# Patient Record
Sex: MALE | Race: WHITE | NOT HISPANIC OR LATINO
[De-identification: names, ages, dates, MRNs, and addresses within clinical notes are randomized per-mention and may not be internally consistent; named-entity substitution may affect disease eponyms.]

---

## 2017-08-15 ENCOUNTER — APPOINTMENT (OUTPATIENT)
Dept: HEMATOLOGY ONCOLOGY | Facility: CLINIC | Age: 72
End: 2017-08-15
Payer: MEDICARE

## 2017-08-15 ENCOUNTER — LABORATORY RESULT (OUTPATIENT)
Age: 72
End: 2017-08-15

## 2017-08-15 VITALS
TEMPERATURE: 98.7 F | HEART RATE: 94 BPM | DIASTOLIC BLOOD PRESSURE: 67 MMHG | BODY MASS INDEX: 32.07 KG/M2 | WEIGHT: 224 LBS | SYSTOLIC BLOOD PRESSURE: 99 MMHG | HEIGHT: 70 IN

## 2017-08-15 PROCEDURE — 36415 COLL VENOUS BLD VENIPUNCTURE: CPT

## 2017-08-15 PROCEDURE — 85025 COMPLETE CBC W/AUTO DIFF WBC: CPT

## 2017-08-16 LAB
ALBUMIN SERPL ELPH-MCNC: 4.2 G/DL
ALP BLD-CCNC: 55 U/L
ALT SERPL-CCNC: 38 U/L
ANION GAP SERPL CALC-SCNC: 16 MMOL/L
AST SERPL-CCNC: 43 U/L
BILIRUB SERPL-MCNC: 0.4 MG/DL
BUN SERPL-MCNC: 23 MG/DL
CALCIUM SERPL-MCNC: 9.6 MG/DL
CHLORIDE SERPL-SCNC: 102 MMOL/L
CO2 SERPL-SCNC: 23 MMOL/L
CREAT SERPL-MCNC: 0.98 MG/DL
GLUCOSE SERPL-MCNC: 124 MG/DL
HCT VFR BLD CALC: 47.2 %
HGB BLD-MCNC: 16.4 G/DL
MCHC RBC-ENTMCNC: 32.1 PG
MCHC RBC-ENTMCNC: 34.8 GM/DL
MCV RBC AUTO: 92.3 FL
PLATELET # BLD AUTO: 131 K/UL
POTASSIUM SERPL-SCNC: 4.2 MMOL/L
PROT SERPL-MCNC: 6.7 G/DL
RBC # BLD: 5.12 M/UL
RBC # FLD: 11.6 %
SODIUM SERPL-SCNC: 141 MMOL/L
WBC # FLD AUTO: 5.3 K/UL

## 2017-08-21 LAB
ALBUMIN MFR SERPL ELPH: 60.2 %
ALBUMIN SERPL-MCNC: 4 G/DL
ALBUMIN/GLOB SERPL: 1.5 RATIO
ALBUPE: 48.9 %
ALPHA1 GLOB MFR SERPL ELPH: 4 %
ALPHA1 GLOB SERPL ELPH-MCNC: 0.3 G/DL
ALPHA1UPE: 16 %
ALPHA2 GLOB MFR SERPL ELPH: 10.9 %
ALPHA2 GLOB SERPL ELPH-MCNC: 0.7 G/DL
ALPHA2UPE: 12.8 %
B-GLOBULIN MFR SERPL ELPH: 10.7 %
B-GLOBULIN SERPL ELPH-MCNC: 0.7 G/DL
BETAUPE: 12 %
CREAT 24H UR-MCNC: NORMAL G/24 H
CREATININE UR (MAYO): 194 MG/DL
DEPRECATED KAPPA LC FREE/LAMBDA SER: 1.62 RATIO
DEPRECATED KAPPA LC FREE/LAMBDA SER: 1.62 RATIO
GAMMA GLOB FLD ELPH-MCNC: 1 G/DL
GAMMA GLOB MFR SERPL ELPH: 14.2 %
GAMMAUPE: 10.3 %
IGA 24H UR QL IFE: NORMAL
IGA SER QL IEP: 165 MG/DL
IGG SER QL IEP: 936 MG/DL
IGM SER QL IEP: 57 MG/DL
INTERPRETATION SERPL IEP-IMP: NORMAL
KAPPA LC 24H UR QL: PRESENT
KAPPA LC CSF-MCNC: 1.55 MG/DL
KAPPA LC CSF-MCNC: 1.55 MG/DL
KAPPA LC SERPL-MCNC: 2.51 MG/DL
KAPPA LC SERPL-MCNC: 2.51 MG/DL
M PROTEIN MFR SERPL ELPH: 4.9 %
M PROTEIN SPEC IFE-MCNC: NORMAL
MONOCLON BAND OBS SERPL: 0.3 G/DL
PROT PATTERN 24H UR ELPH-IMP: NORMAL
PROT SERPL-MCNC: 6.7 G/DL
PROT SERPL-MCNC: 6.7 G/DL
PROT UR-MCNC: 12 MG/DL
PROT UR-MCNC: 12 MG/DL
SPECIMEN VOL 24H UR: NORMAL ML

## 2017-08-22 ENCOUNTER — APPOINTMENT (OUTPATIENT)
Dept: HEMATOLOGY ONCOLOGY | Facility: CLINIC | Age: 72
End: 2017-08-22
Payer: MEDICARE

## 2017-08-22 VITALS
TEMPERATURE: 98.3 F | HEIGHT: 70 IN | WEIGHT: 225 LBS | DIASTOLIC BLOOD PRESSURE: 69 MMHG | SYSTOLIC BLOOD PRESSURE: 103 MMHG | BODY MASS INDEX: 32.21 KG/M2 | HEART RATE: 92 BPM

## 2017-08-22 PROCEDURE — 99213 OFFICE O/P EST LOW 20 MIN: CPT

## 2017-08-22 RX ORDER — DENOSUMAB 60 MG/ML
INJECTION SUBCUTANEOUS
Refills: 0 | Status: ACTIVE | COMMUNITY

## 2018-08-10 ENCOUNTER — LABORATORY RESULT (OUTPATIENT)
Age: 73
End: 2018-08-10

## 2018-08-10 ENCOUNTER — APPOINTMENT (OUTPATIENT)
Dept: HEMATOLOGY ONCOLOGY | Facility: CLINIC | Age: 73
End: 2018-08-10
Payer: MEDICARE

## 2018-08-10 VITALS
BODY MASS INDEX: 32.1 KG/M2 | HEART RATE: 83 BPM | DIASTOLIC BLOOD PRESSURE: 79 MMHG | HEIGHT: 70 IN | TEMPERATURE: 98.5 F | WEIGHT: 224.25 LBS | SYSTOLIC BLOOD PRESSURE: 122 MMHG

## 2018-08-10 PROCEDURE — 85025 COMPLETE CBC W/AUTO DIFF WBC: CPT

## 2018-08-10 PROCEDURE — 36415 COLL VENOUS BLD VENIPUNCTURE: CPT

## 2018-08-17 ENCOUNTER — APPOINTMENT (OUTPATIENT)
Dept: HEMATOLOGY ONCOLOGY | Facility: CLINIC | Age: 73
End: 2018-08-17
Payer: MEDICARE

## 2018-08-17 VITALS
SYSTOLIC BLOOD PRESSURE: 122 MMHG | WEIGHT: 224.25 LBS | DIASTOLIC BLOOD PRESSURE: 70 MMHG | HEIGHT: 70 IN | HEART RATE: 91 BPM | BODY MASS INDEX: 32.1 KG/M2 | TEMPERATURE: 98.5 F

## 2018-08-17 LAB
ALBUMIN MFR SERPL ELPH: 60.9 %
ALBUMIN SERPL ELPH-MCNC: 4.4 G/DL
ALBUMIN SERPL-MCNC: 4 G/DL
ALBUMIN/GLOB SERPL: 1.5 RATIO
ALBUPE: 30.6 %
ALP BLD-CCNC: 51 U/L
ALPHA1 GLOB MFR SERPL ELPH: 3.9 %
ALPHA1 GLOB SERPL ELPH-MCNC: 0.3 G/DL
ALPHA1UPE: 12.2 %
ALPHA2 GLOB MFR SERPL ELPH: 10.8 %
ALPHA2 GLOB SERPL ELPH-MCNC: 0.7 G/DL
ALPHA2UPE: 17.4 %
ALT SERPL-CCNC: 31 U/L
ANION GAP SERPL CALC-SCNC: 14 MMOL/L
AST SERPL-CCNC: 44 U/L
B-GLOBULIN MFR SERPL ELPH: 10.1 %
B-GLOBULIN SERPL ELPH-MCNC: 0.7 G/DL
BETAUPE: 19.5 %
BILIRUB SERPL-MCNC: 0.4 MG/DL
BUN SERPL-MCNC: 19 MG/DL
CALCIUM SERPL-MCNC: 9.3 MG/DL
CHLORIDE SERPL-SCNC: 104 MMOL/L
CO2 SERPL-SCNC: 23 MMOL/L
CREAT 24H UR-MCNC: NORMAL G/24 H
CREAT SERPL-MCNC: 0.94 MG/DL
CREATININE UR (MAYO): 141 MG/DL
DEPRECATED KAPPA LC FREE/LAMBDA SER: 1.05 RATIO
GAMMA GLOB FLD ELPH-MCNC: 0.9 G/DL
GAMMA GLOB MFR SERPL ELPH: 14.3 %
GAMMAUPE: 20.3 %
GLUCOSE SERPL-MCNC: 102 MG/DL
HCT VFR BLD CALC: 47.3 %
HGB BLD-MCNC: 16.2 G/DL
IGA 24H UR QL IFE: NORMAL
IGA SER QL IEP: 174 MG/DL
IGG SER QL IEP: 1057 MG/DL
IGM SER QL IEP: 64 MG/DL
INTERPRETATION SERPL IEP-IMP: NORMAL
KAPPA LC 24H UR QL: PRESENT
KAPPA LC 24H UR QL: PRESENT
KAPPA LC CSF-MCNC: 1.47 MG/DL
KAPPA LC SERPL-MCNC: 1.55 MG/DL
M PROTEIN MFR SERPL ELPH: 3.9 %
M PROTEIN SPEC IFE-MCNC: NORMAL
MCHC RBC-ENTMCNC: 31.4 PG
MCHC RBC-ENTMCNC: 34.3 GM/DL
MCV RBC AUTO: 91.5 FL
MONOCLON BAND OBS SERPL: 0.3 G/DL
PLATELET # BLD AUTO: 152 K/UL
POTASSIUM SERPL-SCNC: 4.1 MMOL/L
PROT PATTERN 24H UR ELPH-IMP: NORMAL
PROT SERPL-MCNC: 6.6 G/DL
PROT UR-MCNC: 12 MG/DL
PROT UR-MCNC: 12 MG/DL
RBC # BLD: 5.16 M/UL
RBC # FLD: 11 %
SODIUM SERPL-SCNC: 141 MMOL/L
SPECIMEN VOL 24H UR: NORMAL ML
WBC # FLD AUTO: 5.3 K/UL

## 2018-08-17 PROCEDURE — 99213 OFFICE O/P EST LOW 20 MIN: CPT

## 2018-08-17 RX ORDER — CHOLECALCIFEROL (VITAMIN D3) 50 MCG
50 MCG TABLET ORAL
Refills: 0 | Status: ACTIVE | COMMUNITY
Start: 2018-08-17

## 2018-08-17 RX ORDER — FAMOTIDINE 20 MG/1
20 TABLET, FILM COATED ORAL TWICE DAILY
Refills: 0 | Status: ACTIVE | COMMUNITY
Start: 2018-08-17

## 2018-08-17 RX ORDER — LEVOMEFOLATE CALCIUM 15 MG
15 TABLET ORAL
Refills: 0 | Status: ACTIVE | COMMUNITY
Start: 2018-08-17

## 2019-06-21 ENCOUNTER — TRANSCRIPTION ENCOUNTER (OUTPATIENT)
Age: 74
End: 2019-06-21

## 2019-07-31 ENCOUNTER — APPOINTMENT (OUTPATIENT)
Dept: HEMATOLOGY ONCOLOGY | Facility: CLINIC | Age: 74
End: 2019-07-31
Payer: MEDICARE

## 2019-07-31 ENCOUNTER — LABORATORY RESULT (OUTPATIENT)
Age: 74
End: 2019-07-31

## 2019-07-31 VITALS — SYSTOLIC BLOOD PRESSURE: 100 MMHG | DIASTOLIC BLOOD PRESSURE: 68 MMHG | TEMPERATURE: 98.3 F | HEART RATE: 88 BPM

## 2019-07-31 LAB
HCT VFR BLD CALC: 45.8 %
HGB BLD-MCNC: 15.3 G/DL
MCHC RBC-ENTMCNC: 31 PG
MCHC RBC-ENTMCNC: 33.4 GM/DL
MCV RBC AUTO: 92.9 FL
PLATELET # BLD AUTO: 134 K/UL
RBC # BLD: 4.93 M/UL
RBC # FLD: 11 %
WBC # FLD AUTO: 5.1 K/UL

## 2019-07-31 PROCEDURE — 85025 COMPLETE CBC W/AUTO DIFF WBC: CPT

## 2019-07-31 PROCEDURE — 36415 COLL VENOUS BLD VENIPUNCTURE: CPT

## 2019-08-01 LAB
ALBUMIN MFR SERPL ELPH: 62.3 %
ALBUMIN SERPL ELPH-MCNC: 4.1 G/DL
ALBUMIN SERPL-MCNC: 3.9 G/DL
ALBUMIN/GLOB SERPL: 1.7 RATIO
ALP BLD-CCNC: 53 U/L
ALPHA1 GLOB MFR SERPL ELPH: 4.1 %
ALPHA1 GLOB SERPL ELPH-MCNC: 0.3 G/DL
ALPHA2 GLOB MFR SERPL ELPH: 10.3 %
ALPHA2 GLOB SERPL ELPH-MCNC: 0.6 G/DL
ALT SERPL-CCNC: 31 U/L
ANION GAP SERPL CALC-SCNC: 12 MMOL/L
AST SERPL-CCNC: 33 U/L
B-GLOBULIN MFR SERPL ELPH: 9.8 %
B-GLOBULIN SERPL ELPH-MCNC: 0.6 G/DL
BILIRUB SERPL-MCNC: 0.3 MG/DL
BUN SERPL-MCNC: 19 MG/DL
CALCIUM SERPL-MCNC: 9 MG/DL
CHLORIDE SERPL-SCNC: 106 MMOL/L
CO2 SERPL-SCNC: 23 MMOL/L
CREAT SERPL-MCNC: 0.81 MG/DL
DEPRECATED KAPPA LC FREE/LAMBDA SER: 1.36 RATIO
DEPRECATED KAPPA LC FREE/LAMBDA SER: 1.36 RATIO
GAMMA GLOB FLD ELPH-MCNC: 0.8 G/DL
GAMMA GLOB MFR SERPL ELPH: 13.5 %
GLUCOSE SERPL-MCNC: 110 MG/DL
IGA SER QL IEP: 153 MG/DL
IGG SER QL IEP: 852 MG/DL
IGM SER QL IEP: 54 MG/DL
INTERPRETATION SERPL IEP-IMP: NORMAL
KAPPA LC CSF-MCNC: 1.59 MG/DL
KAPPA LC CSF-MCNC: 1.59 MG/DL
KAPPA LC SERPL-MCNC: 2.16 MG/DL
KAPPA LC SERPL-MCNC: 2.16 MG/DL
M PROTEIN MFR SERPL ELPH: 2.4 %
M PROTEIN SPEC IFE-MCNC: NORMAL
MONOCLON BAND OBS SERPL: 0.1 G/DL
POTASSIUM SERPL-SCNC: 3.9 MMOL/L
PROT SERPL-MCNC: 6.2 G/DL
SODIUM SERPL-SCNC: 141 MMOL/L

## 2019-08-05 LAB
ALBUPE: 11.1 %
ALPHA1UPE: 37.1 %
ALPHA2UPE: 21.6 %
BETAUPE: 14.3 %
CREAT 24H UR-MCNC: NORMAL G/24 H
CREATININE UR (MAYO): 88 MG/DL
FREE KAPPA URINE: 26.7 MG/L
FREE KAPPA/LAMDA RATIO: 25.19
FREE LAMDA URINE: 1.06 MG/L
GAMMAUPE: 15.9 %
IGA 24H UR QL IFE: NORMAL
KAPPA LC 24H UR QL: NORMAL
PROT PATTERN 24H UR ELPH-IMP: NORMAL
PROT UR-MCNC: 8 MG/DL
PROT UR-MCNC: 8 MG/DL
SPECIMEN VOL 24H UR: NORMAL ML

## 2019-08-07 ENCOUNTER — APPOINTMENT (OUTPATIENT)
Dept: HEMATOLOGY ONCOLOGY | Facility: CLINIC | Age: 74
End: 2019-08-07
Payer: MEDICARE

## 2019-08-07 DIAGNOSIS — D47.2 MONOCLONAL GAMMOPATHY: ICD-10-CM

## 2019-08-07 PROCEDURE — 99214 OFFICE O/P EST MOD 30 MIN: CPT

## 2019-08-07 NOTE — REVIEW OF SYSTEMS
[Patient Intake Form Reviewed] : Patient intake form was reviewed [Fatigue] : fatigue [Joint Stiffness] : joint stiffness [Negative] : Endocrine [FreeTextEntry6] : mild chronic dyspnea [FreeTextEntry2] :  gained weight  [de-identified] : left sided weakness due to CVA

## 2019-08-07 NOTE — PHYSICAL EXAM
[Normal] : affect appropriate [de-identified] : looks well [de-identified] : walks with cane [de-identified] : left sided weakness- unchanged

## 2019-08-07 NOTE — ASSESSMENT
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 \par Discussed option of clinical follow up only - re-evaluation if anemia/ or unexplained renal disease or bone related symptoms. patient is more comfortable with continuous laboratory monitoring as well.\par He will return in 1- 2 years - will have labs before appointment. \par \par Discussed with the patient and his wife.

## 2019-08-07 NOTE — HISTORY OF PRESENT ILLNESS
[Disease:__________________________] : Disease: [unfilled] [de-identified] : No new medical issues since his visit one year ago.   No new complaints.  [de-identified] : Presented with low level monoclonal IgG kappa on a routine blood work in 2010. No anemia, renal disease or neuropathy. He has a history of osteoporosis and compression fractures- treated.\par Monitored yearly.

## 2021-01-15 ENCOUNTER — EMERGENCY (EMERGENCY)
Facility: HOSPITAL | Age: 76
LOS: 0 days | Discharge: HOME | End: 2021-01-16
Attending: FAMILY MEDICINE
Payer: MEDICARE

## 2021-01-15 VITALS
HEART RATE: 94 BPM | SYSTOLIC BLOOD PRESSURE: 142 MMHG | TEMPERATURE: 98 F | RESPIRATION RATE: 17 BRPM | HEIGHT: 70 IN | DIASTOLIC BLOOD PRESSURE: 93 MMHG | WEIGHT: 218.04 LBS | OXYGEN SATURATION: 95 %

## 2021-01-15 DIAGNOSIS — K57.92 DIVERTICULITIS OF INTESTINE, PART UNSPECIFIED, WITHOUT PERFORATION OR ABSCESS WITHOUT BLEEDING: ICD-10-CM

## 2021-01-15 DIAGNOSIS — S22.49XA MULTIPLE FRACTURES OF RIBS, UNSPECIFIED SIDE, INITIAL ENCOUNTER FOR CLOSED FRACTURE: ICD-10-CM

## 2021-01-15 DIAGNOSIS — I69.354 HEMIPLEGIA AND HEMIPARESIS FOLLOWING CEREBRAL INFARCTION AFFECTING LEFT NON-DOMINANT SIDE: ICD-10-CM

## 2021-01-15 DIAGNOSIS — K21.9 GASTRO-ESOPHAGEAL REFLUX DISEASE WITHOUT ESOPHAGITIS: ICD-10-CM

## 2021-01-15 DIAGNOSIS — Z90.49 ACQUIRED ABSENCE OF OTHER SPECIFIED PARTS OF DIGESTIVE TRACT: ICD-10-CM

## 2021-01-15 DIAGNOSIS — Z79.02 LONG TERM (CURRENT) USE OF ANTITHROMBOTICS/ANTIPLATELETS: ICD-10-CM
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
## 2021-01-15 PROCEDURE — 93005 ELECTROCARDIOGRAM TRACING: CPT

## 2021-01-15 PROCEDURE — 71250 CT THORAX DX C-: CPT | Mod: 26

## 2021-01-15 PROCEDURE — 96375 TX/PRO/DX INJ NEW DRUG ADDON: CPT

## 2021-01-15 PROCEDURE — 93010 ELECTROCARDIOGRAM REPORT: CPT

## 2021-01-15 PROCEDURE — 96374 THER/PROPH/DIAG INJ IV PUSH: CPT

## 2021-01-15 PROCEDURE — 72170 X-RAY EXAM OF PELVIS: CPT | Mod: 26

## 2021-01-15 PROCEDURE — 73030 X-RAY EXAM OF SHOULDER: CPT | Mod: 26,LT

## 2021-01-15 PROCEDURE — 73600 X-RAY EXAM OF ANKLE: CPT | Mod: 26,LT

## 2021-01-15 PROCEDURE — 73030 X-RAY EXAM OF SHOULDER: CPT | Mod: LT

## 2021-01-15 PROCEDURE — 73600 X-RAY EXAM OF ANKLE: CPT | Mod: LT

## 2021-01-15 PROCEDURE — 99284 EMERGENCY DEPT VISIT MOD MDM: CPT | Mod: 25

## 2021-01-15 PROCEDURE — 99284 EMERGENCY DEPT VISIT MOD MDM: CPT

## 2021-01-15 PROCEDURE — 73020 X-RAY EXAM OF SHOULDER: CPT | Mod: LT

## 2021-01-15 PROCEDURE — 70450 CT HEAD/BRAIN W/O DYE: CPT

## 2021-01-15 PROCEDURE — 72170 X-RAY EXAM OF PELVIS: CPT

## 2021-01-15 PROCEDURE — 72125 CT NECK SPINE W/O DYE: CPT

## 2021-01-15 PROCEDURE — 73020 X-RAY EXAM OF SHOULDER: CPT | Mod: 26,59,LT

## 2021-01-15 PROCEDURE — 70450 CT HEAD/BRAIN W/O DYE: CPT | Mod: 26

## 2021-01-15 PROCEDURE — 71250 CT THORAX DX C-: CPT

## 2021-01-15 PROCEDURE — 72125 CT NECK SPINE W/O DYE: CPT | Mod: 26

## 2021-01-15 RX ORDER — LIDOCAINE 4 G/100G
1 CREAM TOPICAL ONCE
Refills: 0 | Status: COMPLETED | OUTPATIENT
Start: 2021-01-15 | End: 2021-01-15

## 2021-01-15 RX ORDER — HYDROMORPHONE HYDROCHLORIDE 2 MG/ML
1 INJECTION INTRAMUSCULAR; INTRAVENOUS; SUBCUTANEOUS ONCE
Refills: 0 | Status: DISCONTINUED | OUTPATIENT
Start: 2021-01-15 | End: 2021-01-15

## 2021-01-15 RX ORDER — ACETAMINOPHEN 500 MG
650 TABLET ORAL ONCE
Refills: 0 | Status: COMPLETED | OUTPATIENT
Start: 2021-01-15 | End: 2021-01-15

## 2021-01-15 RX ADMIN — LIDOCAINE 1 PATCH: 4 CREAM TOPICAL at 22:28

## 2021-01-15 RX ADMIN — Medication 650 MILLIGRAM(S): at 22:28

## 2021-01-15 RX ADMIN — HYDROMORPHONE HYDROCHLORIDE 1 MILLIGRAM(S): 2 INJECTION INTRAMUSCULAR; INTRAVENOUS; SUBCUTANEOUS at 22:28

## 2021-01-15 NOTE — ED PROVIDER NOTE - CARE PLAN
Principal Discharge DX:	Rib fractures   Principal Discharge DX:	Rib fractures  Secondary Diagnosis:	Fall, initial encounter

## 2021-01-15 NOTE — ED PROVIDER NOTE - CHIEF COMPLAINT
The patient is a 75y Male complaining of fall. injuries to these sites: face, neck, throat, chest, abdomen and genitals

## 2021-01-15 NOTE — ED PROVIDER NOTE - PROGRESS NOTE DETAILS
Resident note reviewed. Pt with hx cva with left sided weakness on Plavixc/o mechanical fall into own fireplace. No head injury no loc. Pt c/o left shoulder, scapula and rib pain and left ankle pain. Pt has deformity of left ankle and left arm from stroke and is supposed to wear brace on leg. Pt alert, ncat , heart rrr lungs clear abd sof marques, ext tender left scapula and left ribs. deformity of left ankle good distal pulses. sensory intact contracture of left hand and arm. Pt had ct of head, cpine and chest and xrays of ankle and pelvis. Pt received some dilaudid and lido patch with some relief. Await ct results for ?toradol / further treatment. Alex SELF Resident note reviewed. Pt with hx cva with left sided weakness on Plavixc/o mechanical fall into own fireplace. No head injury no loc. Pt c/o left shoulder, scapula and rib pain and left ankle pain. Pt has deformity of left ankle and left arm from stroke and is supposed to wear brace on leg. Usually ambulates with cane.Pt lives with wife and adult autistic daughter. Pt alert, ncat , heart rrr lungs clear abd sof marques, ext tender left scapula and left ribs. deformity of left ankle good distal pulses. sensory intact contracture of left hand and arm. Pt had ct of head, cpine and chest and xrays of ankle and pelvis. Pt received some dilaudid and lido patch with some relief. Await ct results for ?toradol / further treatment. Alex SELF Arlyn Palm I walked the patient with a walker. he is able to bear weight. pain still present but improved. will admin toradol as kindye function last year was normal as per chart review. Will apply air cast and dc with incentive spirometer, oxycontin to his pharmacy and return precautions.

## 2021-01-15 NOTE — ED ADULT NURSE NOTE - OBJECTIVE STATEMENT
Pt presents to ER s/p mechanical fall from standing. Pt reports his left ankle gave out and he fell onto his left side; c/o left shoulder/scapula pain and LLE pain. Denies hitting head. Pt reports his wife called 911 after fall and police assisted pt up. Hx of left side weakness r/t past CVA. Small bruised area/abrasion on left upper back. AO x 3 oriented to baseline, normal breathing pattern with no difficulty.

## 2021-01-15 NOTE — ED ADULT TRIAGE NOTE - CHIEF COMPLAINT QUOTE
patient brought in by EMS s/p fall from standing. patient states his ankle gave out and he fell into the brick fireplace. complains of left scapula and left ankle pain. denies hitting head. patient has left sided weakness from previous stroke. no acute distress noted on arrival.

## 2021-01-15 NOTE — ED PROVIDER NOTE - PHYSICAL EXAMINATION
Physical Exam:    I have reviewed the triage vital signs.    Gen: NAD, AOx3, non-toxic appearing, able to ambulate without assistance  Head and Neck: NCAT, Neck supple without meningismus   HEENT: EOMI, PEERLA, normal conjunctiva, tongue midline, oral mucosa moist  Lung: CTAB, no respiratory distress, no wheezes/rhonchi/rales B/L, speaking in full sentences  CV: RRR, no murmurs, rubs or gallops  Abd: soft, NT, ND, no guarding, no rigidity, no rebound tenderness, no CVA tenderness, no masses.   MSK: no gross deformities, ROM normal in UE, no back tenderness. LUE: pain to left ribs with abduction of LUE. Left hand chronically contracted, N/V/I. LLE ankle contracted, ROM limited, able to raise both legs.   Neuro: CNs II-XII grossly intact. No focal sensory or motor deficits  Skin: Warm, well perfused, no rash, no leg swelling  Psych: Appropriate mood and affect

## 2021-01-15 NOTE — ED PROVIDER NOTE - OBJECTIVE STATEMENT
75M pmh CVA 2 years ago with left sided residual weakness, left ankle and wrist contracture, on Plavix, presents to ED after mechanical fall. Patient states he twisted his ankle, and fell onto left side into his fire place. Called PD who helped him into bed. Pain to left ribs and "scapula". Also complaining of left sided neck pain. Patient waiting at home for hour before calling EMS again for t/p to hospital due to worsening pain. Denies headache, chest pain,abdominal pain. No LOC. No headstrike. Patient on blood thinners. No n/v. Unable to bear weight at this time due to pain. Patient took oxy 5mg at home prior to ED arrival.

## 2021-01-15 NOTE — ED PROVIDER NOTE - PATIENT PORTAL LINK FT
You can access the FollowMyHealth Patient Portal offered by [facility] by registering at the following website: [email or web address]. By joining [employer]’s FollowMyHealth portal, you will also be able to view your health information using other applications (apps) compatible with our system.

## 2021-01-15 NOTE — ED PROVIDER NOTE - CLINICAL SUMMARY MEDICAL DECISION MAKING FREE TEXT BOX
Arlyn: elderly male s/p fall from standing height , mechanical in nature from twisting ankle. Patient c/o rib pain. Plan: CT chest to rule in rib fractures, multi modal pain control, incentive spirometry, shoulder XR left side, pelvic xray, road test, reassess.

## 2021-01-15 NOTE — ED PROVIDER NOTE - COVID-19 ORDERING FACILITY
[facility]  - [facility]- Covid 19 Testing

## 2021-01-15 NOTE — ED ADULT NURSE NOTE - NSIMPLEMENTINTERV_GEN_ALL_ED
Implemented All Fall with Harm Risk Interventions:  [place] to call system. Call bell, personal items and telephone within reach. Instruct patient to call for assistance. Room bathroom lighting operational. Non-slip footwear when patient is off stretcher. Physically safe environment: no spills, clutter or unnecessary equipment. Stretcher in lowest position, wheels locked, appropriate side rails in place. Provide visual cue, wrist band, yellow gown, etc. Monitor gait and stability. Monitor for mental status changes and reorient to person, place, and time. Review medications for side effects contributing to fall risk. Reinforce activity limits and safety measures with patient and family. Provide visual clues: red socks.

## 2021-01-15 NOTE — ED PROVIDER NOTE - ATTENDING CONTRIBUTION TO CARE
Pt eval, treatment and pln contemporaneously with resident Arlyn. I was present during key portions of visit. Agree with notes. Alex SELF

## 2021-01-15 NOTE — ED PROVIDER NOTE - PMH
CVA (Cerebral Infarction) (ICD9 434.91)    Diverticulitis, Intestine Large (ICD9 562.11)    GERD (Gastroesophageal Reflux Disease) (ICD9 530.81)    Hip Fracture (ICD9 820.8)    Migraine Headache (ICD9 346.90)

## 2021-01-15 NOTE — ED PROVIDER NOTE - NSFOLLOWUPINSTRUCTIONS_ED_ALL_ED_FT
You were seen for rib fractures.     Seek immediate medical assistance for any new or worsening symptoms such as cough fever or chills.     Please take 600 mg of Ibuprofen (aka Motrin, Advil) and/or 650 mg Acetaminophen (aka Tylenol) every 6 hours, as needed, for mild-moderate pain.     Oxycodone 5mg was sent to your pharmacy. Take one pill every 6 hours for severe pain.     Use the incentive spirometer for breathing exercises 15 times per hour to prevent pneumonia.

## 2021-01-16 VITALS
DIASTOLIC BLOOD PRESSURE: 87 MMHG | OXYGEN SATURATION: 96 % | RESPIRATION RATE: 17 BRPM | SYSTOLIC BLOOD PRESSURE: 144 MMHG | HEART RATE: 90 BPM | TEMPERATURE: 98 F

## 2021-01-16 RX ORDER — OXYCODONE HYDROCHLORIDE 5 MG/1
1 TABLET ORAL
Qty: 12 | Refills: 0
Start: 2021-01-16 | End: 2021-01-18

## 2021-01-16 RX ORDER — KETOROLAC TROMETHAMINE 30 MG/ML
15 SYRINGE (ML) INJECTION ONCE
Refills: 0 | Status: DISCONTINUED | OUTPATIENT
Start: 2021-01-16 | End: 2021-01-16

## 2021-01-16 RX ADMIN — Medication 15 MILLIGRAM(S): at 00:19

## 2021-03-17 ENCOUNTER — EMERGENCY (EMERGENCY)
Facility: HOSPITAL | Age: 76
LOS: 0 days | Discharge: HOME | End: 2021-03-17
Attending: EMERGENCY MEDICINE
Payer: MEDICARE

## 2021-03-17 VITALS — WEIGHT: 216.93 LBS | HEIGHT: 70 IN

## 2021-03-17 VITALS
TEMPERATURE: 99 F | HEART RATE: 88 BPM | SYSTOLIC BLOOD PRESSURE: 136 MMHG | OXYGEN SATURATION: 99 % | DIASTOLIC BLOOD PRESSURE: 90 MMHG | RESPIRATION RATE: 18 BRPM

## 2021-03-17 DIAGNOSIS — N41.0 ACUTE PROSTATITIS: ICD-10-CM

## 2021-03-17 DIAGNOSIS — K21.9 GASTRO-ESOPHAGEAL REFLUX DISEASE WITHOUT ESOPHAGITIS: ICD-10-CM

## 2021-03-17 DIAGNOSIS — K57.92 DIVERTICULITIS OF INTESTINE, PART UNSPECIFIED, WITHOUT PERFORATION OR ABSCESS WITHOUT BLEEDING: ICD-10-CM

## 2021-03-17 DIAGNOSIS — N41.1 CHRONIC PROSTATITIS: ICD-10-CM

## 2021-03-17 DIAGNOSIS — R30.0 DYSURIA: ICD-10-CM

## 2021-03-17 DIAGNOSIS — Z88.8 ALLERGY STATUS TO OTHER DRUGS, MEDICAMENTS AND BIOLOGICAL SUBSTANCES STATUS: ICD-10-CM

## 2021-03-17 DIAGNOSIS — R82.71 BACTERIURIA: ICD-10-CM

## 2021-03-17 DIAGNOSIS — N40.0 BENIGN PROSTATIC HYPERPLASIA WITHOUT LOWER URINARY TRACT SYMPTOMS: ICD-10-CM

## 2021-03-17 DIAGNOSIS — Z86.73 PERSONAL HISTORY OF TRANSIENT ISCHEMIC ATTACK (TIA), AND CEREBRAL INFARCTION WITHOUT RESIDUAL DEFICITS: ICD-10-CM

## 2021-03-17 DIAGNOSIS — B96.5 PSEUDOMONAS (AERUGINOSA) (MALLEI) (PSEUDOMALLEI) AS THE CAUSE OF DISEASES CLASSIFIED ELSEWHERE: ICD-10-CM

## 2021-03-17 LAB
ALBUMIN SERPL ELPH-MCNC: 3.5 G/DL — SIGNIFICANT CHANGE UP (ref 3.3–5)
ALP SERPL-CCNC: 68 U/L — SIGNIFICANT CHANGE UP (ref 40–120)
ALT FLD-CCNC: 43 U/L — SIGNIFICANT CHANGE UP (ref 12–78)
ANION GAP SERPL CALC-SCNC: 4 MMOL/L — LOW (ref 5–17)
APPEARANCE UR: CLEAR — SIGNIFICANT CHANGE UP
APTT BLD: 32.3 SEC — SIGNIFICANT CHANGE UP (ref 27.5–35.5)
AST SERPL-CCNC: 34 U/L — SIGNIFICANT CHANGE UP (ref 15–37)
BASOPHILS # BLD AUTO: 0.06 K/UL — SIGNIFICANT CHANGE UP (ref 0–0.2)
BASOPHILS NFR BLD AUTO: 1.3 % — SIGNIFICANT CHANGE UP (ref 0–2)
BILIRUB SERPL-MCNC: 0.5 MG/DL — SIGNIFICANT CHANGE UP (ref 0.2–1.2)
BILIRUB UR-MCNC: NEGATIVE — SIGNIFICANT CHANGE UP
BUN SERPL-MCNC: 19 MG/DL — SIGNIFICANT CHANGE UP (ref 7–23)
CALCIUM SERPL-MCNC: 9.2 MG/DL — SIGNIFICANT CHANGE UP (ref 8.5–10.1)
CHLORIDE SERPL-SCNC: 108 MMOL/L — SIGNIFICANT CHANGE UP (ref 96–108)
CO2 SERPL-SCNC: 28 MMOL/L — SIGNIFICANT CHANGE UP (ref 22–31)
COLOR SPEC: YELLOW — SIGNIFICANT CHANGE UP
CREAT SERPL-MCNC: 0.84 MG/DL — SIGNIFICANT CHANGE UP (ref 0.5–1.3)
CRP SERPL-MCNC: <3 MG/L — SIGNIFICANT CHANGE UP
DIFF PNL FLD: ABNORMAL
EOSINOPHIL # BLD AUTO: 0.27 K/UL — SIGNIFICANT CHANGE UP (ref 0–0.5)
EOSINOPHIL NFR BLD AUTO: 5.7 % — SIGNIFICANT CHANGE UP (ref 0–6)
ERYTHROCYTE [SEDIMENTATION RATE] IN BLOOD: 8 MM/HR — SIGNIFICANT CHANGE UP (ref 0–20)
GLUCOSE SERPL-MCNC: 92 MG/DL — SIGNIFICANT CHANGE UP (ref 70–99)
GLUCOSE UR QL: NEGATIVE — SIGNIFICANT CHANGE UP
HCT VFR BLD CALC: 45.4 % — SIGNIFICANT CHANGE UP (ref 39–50)
HGB BLD-MCNC: 14.7 G/DL — SIGNIFICANT CHANGE UP (ref 13–17)
IMM GRANULOCYTES NFR BLD AUTO: 0.2 % — SIGNIFICANT CHANGE UP (ref 0–1.5)
INR BLD: 1.14 RATIO — SIGNIFICANT CHANGE UP (ref 0.88–1.16)
KETONES UR-MCNC: NEGATIVE — SIGNIFICANT CHANGE UP
LACTATE SERPL-SCNC: 1.4 MMOL/L — SIGNIFICANT CHANGE UP (ref 0.7–2)
LEUKOCYTE ESTERASE UR-ACNC: ABNORMAL
LYMPHOCYTES # BLD AUTO: 1.29 K/UL — SIGNIFICANT CHANGE UP (ref 1–3.3)
LYMPHOCYTES # BLD AUTO: 27.3 % — SIGNIFICANT CHANGE UP (ref 13–44)
MCHC RBC-ENTMCNC: 30.8 PG — SIGNIFICANT CHANGE UP (ref 27–34)
MCHC RBC-ENTMCNC: 32.4 GM/DL — SIGNIFICANT CHANGE UP (ref 32–36)
MCV RBC AUTO: 95 FL — SIGNIFICANT CHANGE UP (ref 80–100)
MONOCYTES # BLD AUTO: 0.58 K/UL — SIGNIFICANT CHANGE UP (ref 0–0.9)
MONOCYTES NFR BLD AUTO: 12.3 % — SIGNIFICANT CHANGE UP (ref 2–14)
NEUTROPHILS # BLD AUTO: 2.52 K/UL — SIGNIFICANT CHANGE UP (ref 1.8–7.4)
NEUTROPHILS NFR BLD AUTO: 53.2 % — SIGNIFICANT CHANGE UP (ref 43–77)
NITRITE UR-MCNC: POSITIVE
PH UR: 6 — SIGNIFICANT CHANGE UP (ref 5–8)
PLATELET # BLD AUTO: 147 K/UL — LOW (ref 150–400)
POTASSIUM SERPL-MCNC: 4 MMOL/L — SIGNIFICANT CHANGE UP (ref 3.5–5.3)
POTASSIUM SERPL-SCNC: 4 MMOL/L — SIGNIFICANT CHANGE UP (ref 3.5–5.3)
PROT SERPL-MCNC: 6.8 GM/DL — SIGNIFICANT CHANGE UP (ref 6–8.3)
PROT UR-MCNC: NEGATIVE — SIGNIFICANT CHANGE UP
PROTHROM AB SERPL-ACNC: 13.3 SEC — SIGNIFICANT CHANGE UP (ref 10.6–13.6)
RBC # BLD: 4.78 M/UL — SIGNIFICANT CHANGE UP (ref 4.2–5.8)
RBC # FLD: 12.5 % — SIGNIFICANT CHANGE UP (ref 10.3–14.5)
SODIUM SERPL-SCNC: 140 MMOL/L — SIGNIFICANT CHANGE UP (ref 135–145)
SP GR SPEC: 1.2 — HIGH (ref 1.01–1.02)
UROBILINOGEN FLD QL: NEGATIVE — SIGNIFICANT CHANGE UP
WBC # BLD: 4.73 K/UL — SIGNIFICANT CHANGE UP (ref 3.8–10.5)
WBC # FLD AUTO: 4.73 K/UL — SIGNIFICANT CHANGE UP (ref 3.8–10.5)

## 2021-03-17 PROCEDURE — 87186 SC STD MICRODIL/AGAR DIL: CPT

## 2021-03-17 PROCEDURE — 85610 PROTHROMBIN TIME: CPT

## 2021-03-17 PROCEDURE — 96365 THER/PROPH/DIAG IV INF INIT: CPT | Mod: XU

## 2021-03-17 PROCEDURE — 71045 X-RAY EXAM CHEST 1 VIEW: CPT

## 2021-03-17 PROCEDURE — 86140 C-REACTIVE PROTEIN: CPT

## 2021-03-17 PROCEDURE — 36573 INSJ PICC RS&I 5 YR+: CPT

## 2021-03-17 PROCEDURE — 81001 URINALYSIS AUTO W/SCOPE: CPT

## 2021-03-17 PROCEDURE — 85652 RBC SED RATE AUTOMATED: CPT

## 2021-03-17 PROCEDURE — 87040 BLOOD CULTURE FOR BACTERIA: CPT

## 2021-03-17 PROCEDURE — 85730 THROMBOPLASTIN TIME PARTIAL: CPT

## 2021-03-17 PROCEDURE — 36415 COLL VENOUS BLD VENIPUNCTURE: CPT

## 2021-03-17 PROCEDURE — 93005 ELECTROCARDIOGRAM TRACING: CPT | Mod: XU

## 2021-03-17 PROCEDURE — 99284 EMERGENCY DEPT VISIT MOD MDM: CPT

## 2021-03-17 PROCEDURE — 87086 URINE CULTURE/COLONY COUNT: CPT

## 2021-03-17 PROCEDURE — 71045 X-RAY EXAM CHEST 1 VIEW: CPT | Mod: 26

## 2021-03-17 PROCEDURE — 36569 INSJ PICC 5 YR+ W/O IMAGING: CPT

## 2021-03-17 PROCEDURE — 93010 ELECTROCARDIOGRAM REPORT: CPT

## 2021-03-17 PROCEDURE — C1751: CPT

## 2021-03-17 PROCEDURE — 80053 COMPREHEN METABOLIC PANEL: CPT

## 2021-03-17 PROCEDURE — 83605 ASSAY OF LACTIC ACID: CPT

## 2021-03-17 PROCEDURE — 87077 CULTURE AEROBIC IDENTIFY: CPT

## 2021-03-17 PROCEDURE — 85025 COMPLETE CBC W/AUTO DIFF WBC: CPT

## 2021-03-17 PROCEDURE — 99284 EMERGENCY DEPT VISIT MOD MDM: CPT | Mod: 25

## 2021-03-17 RX ORDER — CEFEPIME 1 G/1
2000 INJECTION, POWDER, FOR SOLUTION INTRAMUSCULAR; INTRAVENOUS ONCE
Refills: 0 | Status: COMPLETED | OUTPATIENT
Start: 2021-03-17 | End: 2021-03-17

## 2021-03-17 RX ADMIN — CEFEPIME 2000 MILLIGRAM(S): 1 INJECTION, POWDER, FOR SOLUTION INTRAMUSCULAR; INTRAVENOUS at 09:20

## 2021-03-17 RX ADMIN — CEFEPIME 100 MILLIGRAM(S): 1 INJECTION, POWDER, FOR SOLUTION INTRAMUSCULAR; INTRAVENOUS at 08:49

## 2021-03-17 NOTE — ED PROVIDER NOTE - NS ED ROS FT
Constitutional: No fever or chills  Eyes: No visual changes  HEENT: No throat pain  CV: No chest pain  Resp: No SOB no cough  GI: No abd pain, nausea or vomiting  : No dysuria, +persistent UTI  MSK: No musculoskeletal pain  Skin: No rash  Neuro: No headache

## 2021-03-17 NOTE — ED PROVIDER NOTE - CARE PROVIDER_API CALL
Aris Choudhary  INFECTIOUS DISEASE  [address], [address] [address]  Phone: [phone]  Fax: [phone]  Follow Up Time: 4-6 Days

## 2021-03-17 NOTE — ED PROVIDER NOTE - CLINICAL SUMMARY MEDICAL DECISION MAKING FREE TEXT BOX
76 y/o male presenting for pick line and IV Abx for UTI. Will place IV, give Abx, and consult Dr. Choudhary. Will also obtain blood work, discuss with IV team for pick line, and social work case management.

## 2021-03-17 NOTE — CONSULT NOTE ADULT - SUBJECTIVE AND OBJECTIVE BOX
Patient is a 75y old  Male who presents with a chief complaint of dysuria    HPI:  74 y/o male with h/o BPH, recurrent UTI's s/p multiple abx therapy, old CVA, GERD, migraines, colon resected presented to ER for persistent dysuria and bacteriuria with resistant organism. He was treated as outpatient with macrobid, then cipro PO, but failed to clear the urinary infection. A repeat urine c/s on 3/5 showed pseudomonas aeruginosae resistant to all oral abx. No fever reported at home.        CVA (Cerebral Infarction) (ICD9 434.91)    Diverticulitis, Intestine Large (ICD9 562.11)    GERD (Gastroesophageal Reflux Disease) (ICD9 530.81)    Hip Fracture (ICD9 820.8)    Migraine Headache (ICD9 346.90).     Past Surgical History:  S/P Colon Resection (ICD9 V45.89).    Meds: per reconciliation sheet, noted below  MEDICATIONS  (STANDING):  cefepime   IVPB 2000 milliGRAM(s) IV Intermittent once    MEDICATIONS  (PRN):    Allergies    heparin (Unknown)    Intolerances      Social: no smoking, no alcohol, no illegal drugs; no recent travel, no exposure to TB  FAMILY HISTORY:    no history of premature cardiovascular disease in first degree relatives    ROS: the patient denies fever, no chills, no HA, no seizures, no dizziness, no sore throat, no nasal congestion, no blurry vision, no CP, no palpitations, no SOB, no cough, no abdominal pain, no diarrhea, no N/V, no dysuria, no leg pain, no claudication, no rash, no joint aches, no rectal pain or bleeding, no night sweats  All other systems reviewed and are negative    Vital Signs Last 24 Hrs  T(C): 37.1 (17 Mar 2021 07:57), Max: 37.1 (17 Mar 2021 07:57)  T(F): 98.7 (17 Mar 2021 07:57), Max: 98.7 (17 Mar 2021 07:57)  HR: 90 (17 Mar 2021 07:57) (90 - 90)  BP: 126/87 (17 Mar 2021 07:57) (126/87 - 126/87)  BP(mean): 100 (17 Mar 2021 07:57) (100 - 100)  RR: 18 (17 Mar 2021 07:57) (18 - 18)  SpO2: 100% (17 Mar 2021 07:57) (100% - 100%)  Daily Height in cm: 177.8 (17 Mar 2021 07:52)    Daily     PE:    Constitutional:  No acute distress  HEENT: NC/AT, EOMI, PERRLA, conjunctivae clear; ears and nose atraumatic; pharynx benign  Neck: supple; thyroid not palpable  Back: no tenderness  Respiratory: respiratory effort normal; clear to auscultation  Cardiovascular: S1S2 regular, no murmurs  Abdomen: soft, not tender, not distended, positive BS; no liver or spleen organomegaly  Genitourinary: no suprapubic tenderness  Lymphatic: no LN palpable  Musculoskeletal: no muscle tenderness, no joint swelling or tenderness  Extremities: no pedal edema  Neurological/ Psychiatric: AxOx3, judgement and insight normal; moving all extremities  Skin: no rashes; no palpable lesions    Labs: all available labs reviewed                           Radiology: all available radiological tests reviewed    Advanced directives addressed: full resuscitation

## 2021-03-17 NOTE — CONSULT NOTE ADULT - ASSESSMENT
76 y/o male with h/o BPH, recurrent UTI's s/p multiple abx therapy, old CVA, GERD, migraines, colon resected presented to ER for persistent dysuria and bacteriuria with resistant organism. He was treated as outpatient with macrobid, then cipro PO, but failed to clear the urinary infection. A repeat urine c/s on 3/5 showed pseudomonas aeruginosae resistant to all oral abx. No fever reported at home.      1. Acute on chronic prostatitis with PSAE. UTI. Dysuria. BPH s/p prior TURP.  -abx options d/w patient  -no clinical signs of sepsis  -start cefepime 2 gm IV q12h  -reason for abx use and side effects reviewed with patient; monitor BMP   -plan for PICC line  -home IV abx setup in progress; case reviewed with case management; orders reviewed and called in to pharmacist with infusion company; awaiting insurance approval  -plan for 5 weeks of IV abx therapy  -f/u with urology  -old chart reviewed to assess prior cultures  -monitor temps  -f/u CBC  -supportive care  2. Other issues:   -care per medicine

## 2021-03-17 NOTE — ADVANCED PRACTICE NURSE CONSULT - ASSESSMENT
Rec.d order to place PICC line for long-term IV abx. Reviewed chart, labwork, explained all risks and benefits and obtained consent for PICC placement. Pt. A&Ox3 and verified pt.’s identification, name, , and correct procedure. Inserted Navilyst PICC 4FS w/PASV technology via ultrasound guidance to ­right basilic, number of insertion attempts: 1, cut to 43 cm. length, brisk blood return, flushes well w/20 ml. NS. Site prepped with CHG, Draped in sterile fashion using strict aseptic technique maintained throughout procedure, performed hand hygiene, all team members maintained full barrier precautions, 1% lidocaine used subdermally, Minimal blood loss. Site covered with sterile dressing and dated. No complications. Endcap placed. Pt. tolerated well. All sharps and guidewires accounted for. CXR confirms placement, no pneumothorax. Report given to district nurse. Lot #[id number].    Rec.d order to place PICC line for long-term IV abx. Reviewed chart, labwork, explained all risks and benefits and obtained consent for PICC placement. Pt. A&Ox3 and verified pt.’s identification, name, , and correct procedure. Inserted Navilyst PICC 4FS w/PASV technology via ultrasound guidance to ­right basilic, number of insertion attempts: 1, cut to 43 cm. length, brisk blood return, flushes well w/20 ml. NS. Site prepped with CHG, Draped in sterile fashion using strict aseptic technique maintained throughout procedure, performed hand hygiene, all team members maintained full barrier precautions, 1% lidocaine used subdermally, Minimal blood loss. Site covered with sterile dressing and dated. No complications. Endcap placed. Pt. tolerated well. All sharps and guidewires accounted for. CXR confirms placement, no pneumothorax. Report given to district nurse. Lot #[id number]. Education packet given along with verbal instructions.

## 2021-03-17 NOTE — ED PROVIDER NOTE - OBJECTIVE STATEMENT
76 y/o male with PMHX of Diverticulitis, GERD, s/p CVA presents to the ED at request of infectious disease doctor c/o UTI. Pt states he's had a UTI for over a year, reports receiving abx however UTI still persistent. Pt reports pseudomonas detected in urine for over a year. Denies fevers or chills. Pt states he is asymptomatic at this time, however he was still sent in for pick line to receive IV Abx. No other complaints at this time.

## 2021-03-17 NOTE — ED ADULT TRIAGE NOTE - NS ED NURSE BANDS TYPE
OT orders received. Pt with LE DVT. Will follow 24 hour protocol.  OT will continue to follow   Name band;

## 2021-03-17 NOTE — ED ADULT TRIAGE NOTE - CHIEF COMPLAINT QUOTE
Pt arrives to ED sent in by MD Choudhary for long term IV placement and antibiotics for bacteria resistant UTI.

## 2021-03-17 NOTE — ED ADULT NURSE NOTE - OBJECTIVE STATEMENT
sent in by Dr. Choudhary for IV abx after failing PO abx for UTI treatment. Iv team called for PICC line placement. L sided residual weakness s/p stroke 12 years ago per pt.

## 2021-03-17 NOTE — ED PROVIDER NOTE - PATIENT PORTAL LINK FT
You can access the FollowMyHealth Patient Portal offered by [facility] by registering at the following website: [email or web address]. By joining [employer]’s FollowMyHealth portal, you will also be able to view your health information using other applications (apps) compatible with our system.

## 2021-03-17 NOTE — ED PROVIDER NOTE - CARE PLAN
Principal Discharge DX:	UTI (urinary tract infection)  Secondary Diagnosis:	PICC (peripherally inserted central catheter) in place

## 2021-03-17 NOTE — ED ADULT NURSE NOTE - NSIMPLEMENTINTERV_GEN_ALL_ED
Implemented All Fall Risk Interventions:  [place] to call system. Call bell, personal items and telephone within reach. Instruct patient to call for assistance. Room bathroom lighting operational. Non-slip footwear when patient is off stretcher. Physically safe environment: no spills, clutter or unnecessary equipment. Stretcher in lowest position, wheels locked, appropriate side rails in place. Provide visual cue, wrist band, yellow gown, etc. Monitor gait and stability. Monitor for mental status changes and reorient to person, place, and time. Review medications for side effects contributing to fall risk. Reinforce activity limits and safety measures with patient and family.

## 2021-03-17 NOTE — ED PROVIDER NOTE - PHYSICAL EXAMINATION
Constitutional: Elderly male sitting in bed in no acute distress, aaox3  Eyes: PERRLA EOMI  Head: Normocephalic atraumatic  Mouth: MMM  Cardiac: regular rate   Resp: Lungs CTAB  GI: Abd s/nt/nd, no rebound or guarding.  Neuro: awake, alert, moving all extremities, cranial nerves 2-12 intact, sensation intact, no dysmetria.  Skin: No rashes

## 2021-03-19 LAB
-  AMIKACIN: SIGNIFICANT CHANGE UP
-  AZTREONAM: SIGNIFICANT CHANGE UP
-  CEFEPIME: SIGNIFICANT CHANGE UP
-  CEFTAZIDIME: SIGNIFICANT CHANGE UP
-  CIPROFLOXACIN: SIGNIFICANT CHANGE UP
-  GENTAMICIN: SIGNIFICANT CHANGE UP
-  IMIPENEM: SIGNIFICANT CHANGE UP
-  LEVOFLOXACIN: SIGNIFICANT CHANGE UP
-  MEROPENEM: SIGNIFICANT CHANGE UP
-  PIPERACILLIN/TAZOBACTAM: SIGNIFICANT CHANGE UP
-  TOBRAMYCIN: SIGNIFICANT CHANGE UP
CULTURE RESULTS: SIGNIFICANT CHANGE UP
METHOD TYPE: SIGNIFICANT CHANGE UP
ORGANISM # SPEC MICROSCOPIC CNT: SIGNIFICANT CHANGE UP
ORGANISM # SPEC MICROSCOPIC CNT: SIGNIFICANT CHANGE UP
SPECIMEN SOURCE: SIGNIFICANT CHANGE UP

## 2021-03-20 NOTE — ED POST DISCHARGE NOTE - DETAILS
As per Dr. Choudhary's note on 3/17/21, patient to have PICC line placed with plan for IV cefepime at home x 5 weeks. - Dash Magana PA-C

## 2021-03-22 LAB
CULTURE RESULTS: SIGNIFICANT CHANGE UP
CULTURE RESULTS: SIGNIFICANT CHANGE UP
SPECIMEN SOURCE: SIGNIFICANT CHANGE UP
SPECIMEN SOURCE: SIGNIFICANT CHANGE UP

## 2021-05-20 ENCOUNTER — APPOINTMENT (OUTPATIENT)
Dept: NEUROLOGY | Facility: CLINIC | Age: 76
End: 2021-05-20

## 2021-05-20 ENCOUNTER — INPATIENT (INPATIENT)
Facility: HOSPITAL | Age: 76
LOS: 0 days | Discharge: HOME | DRG: 69 | End: 2021-05-21
Attending: FAMILY MEDICINE | Admitting: FAMILY MEDICINE
Payer: MEDICARE

## 2021-05-20 VITALS
SYSTOLIC BLOOD PRESSURE: 152 MMHG | OXYGEN SATURATION: 94 % | HEIGHT: 70 IN | TEMPERATURE: 98 F | DIASTOLIC BLOOD PRESSURE: 84 MMHG | WEIGHT: 225.09 LBS | HEART RATE: 92 BPM | RESPIRATION RATE: 17 BRPM

## 2021-05-20 DIAGNOSIS — G45.9 TRANSIENT CEREBRAL ISCHEMIC ATTACK, UNSPECIFIED: ICD-10-CM

## 2021-05-20 LAB
APPEARANCE UR: CLEAR — SIGNIFICANT CHANGE UP
BASE EXCESS BLDV CALC-SCNC: 0.8 MMOL/L — SIGNIFICANT CHANGE UP (ref -2–2)
BASOPHILS # BLD AUTO: 0.06 K/UL — SIGNIFICANT CHANGE UP (ref 0–0.2)
BASOPHILS NFR BLD AUTO: 0.7 % — SIGNIFICANT CHANGE UP (ref 0–2)
BILIRUB UR-MCNC: NEGATIVE — SIGNIFICANT CHANGE UP
COLOR SPEC: YELLOW — SIGNIFICANT CHANGE UP
DIFF PNL FLD: ABNORMAL
EOSINOPHIL # BLD AUTO: 0.28 K/UL — SIGNIFICANT CHANGE UP (ref 0–0.5)
EOSINOPHIL NFR BLD AUTO: 3.3 % — SIGNIFICANT CHANGE UP (ref 0–6)
GLUCOSE UR QL: NEGATIVE MG/DL — SIGNIFICANT CHANGE UP
HCO3 BLDV-SCNC: 25 MMOL/L — SIGNIFICANT CHANGE UP (ref 21–29)
HCT VFR BLD CALC: 44.8 % — SIGNIFICANT CHANGE UP (ref 39–50)
HGB BLD-MCNC: 15.1 G/DL — SIGNIFICANT CHANGE UP (ref 13–17)
IMM GRANULOCYTES NFR BLD AUTO: 0.2 % — SIGNIFICANT CHANGE UP (ref 0–1.5)
KETONES UR-MCNC: NEGATIVE — SIGNIFICANT CHANGE UP
LEUKOCYTE ESTERASE UR-ACNC: ABNORMAL
LYMPHOCYTES # BLD AUTO: 1.8 K/UL — SIGNIFICANT CHANGE UP (ref 1–3.3)
LYMPHOCYTES # BLD AUTO: 21.2 % — SIGNIFICANT CHANGE UP (ref 13–44)
MCHC RBC-ENTMCNC: 30.9 PG — SIGNIFICANT CHANGE UP (ref 27–34)
MCHC RBC-ENTMCNC: 33.7 GM/DL — SIGNIFICANT CHANGE UP (ref 32–36)
MCV RBC AUTO: 91.8 FL — SIGNIFICANT CHANGE UP (ref 80–100)
MONOCYTES # BLD AUTO: 0.99 K/UL — HIGH (ref 0–0.9)
MONOCYTES NFR BLD AUTO: 11.6 % — SIGNIFICANT CHANGE UP (ref 2–14)
NEUTROPHILS # BLD AUTO: 5.35 K/UL — SIGNIFICANT CHANGE UP (ref 1.8–7.4)
NEUTROPHILS NFR BLD AUTO: 63 % — SIGNIFICANT CHANGE UP (ref 43–77)
NITRITE UR-MCNC: POSITIVE
PCO2 BLDV: 39 MMHG — SIGNIFICANT CHANGE UP (ref 35–50)
PH BLDV: 7.42 — SIGNIFICANT CHANGE UP (ref 7.35–7.45)
PH UR: 6.5 — SIGNIFICANT CHANGE UP (ref 5–8)
PLATELET # BLD AUTO: 130 K/UL — LOW (ref 150–400)
PO2 BLDV: 139 MMHG — HIGH (ref 25–45)
PROT UR-MCNC: 15 MG/DL
RBC # BLD: 4.88 M/UL — SIGNIFICANT CHANGE UP (ref 4.2–5.8)
RBC # FLD: 12.6 % — SIGNIFICANT CHANGE UP (ref 10.3–14.5)
SAO2 % BLDV: 99 % — HIGH (ref 67–88)
SARS-COV-2 RNA SPEC QL NAA+PROBE: SIGNIFICANT CHANGE UP
SP GR SPEC: 1.01 — SIGNIFICANT CHANGE UP (ref 1.01–1.02)
UROBILINOGEN FLD QL: NEGATIVE MG/DL — SIGNIFICANT CHANGE UP
WBC # BLD: 8.5 K/UL — SIGNIFICANT CHANGE UP (ref 3.8–10.5)
WBC # FLD AUTO: 8.5 K/UL — SIGNIFICANT CHANGE UP (ref 3.8–10.5)

## 2021-05-20 PROCEDURE — 93306 TTE W/DOPPLER COMPLETE: CPT

## 2021-05-20 PROCEDURE — 86803 HEPATITIS C AB TEST: CPT

## 2021-05-20 PROCEDURE — 80048 BASIC METABOLIC PNL TOTAL CA: CPT

## 2021-05-20 PROCEDURE — 99222 1ST HOSP IP/OBS MODERATE 55: CPT

## 2021-05-20 PROCEDURE — 86769 SARS-COV-2 COVID-19 ANTIBODY: CPT

## 2021-05-20 PROCEDURE — 92610 EVALUATE SWALLOWING FUNCTION: CPT | Mod: GN

## 2021-05-20 PROCEDURE — 0042T: CPT

## 2021-05-20 PROCEDURE — 70551 MRI BRAIN STEM W/O DYE: CPT

## 2021-05-20 PROCEDURE — 70551 MRI BRAIN STEM W/O DYE: CPT | Mod: 26

## 2021-05-20 PROCEDURE — 97161 PT EVAL LOW COMPLEX 20 MIN: CPT | Mod: GP

## 2021-05-20 PROCEDURE — 97116 GAIT TRAINING THERAPY: CPT | Mod: GP

## 2021-05-20 PROCEDURE — 99284 EMERGENCY DEPT VISIT MOD MDM: CPT

## 2021-05-20 PROCEDURE — 87186 SC STD MICRODIL/AGAR DIL: CPT

## 2021-05-20 PROCEDURE — 70498 CT ANGIOGRAPHY NECK: CPT | Mod: 26,MA

## 2021-05-20 PROCEDURE — 71045 X-RAY EXAM CHEST 1 VIEW: CPT | Mod: 26

## 2021-05-20 PROCEDURE — 70450 CT HEAD/BRAIN W/O DYE: CPT | Mod: 26,59,MA

## 2021-05-20 PROCEDURE — 70496 CT ANGIOGRAPHY HEAD: CPT | Mod: 26,MA

## 2021-05-20 PROCEDURE — 36415 COLL VENOUS BLD VENIPUNCTURE: CPT

## 2021-05-20 PROCEDURE — 81001 URINALYSIS AUTO W/SCOPE: CPT

## 2021-05-20 PROCEDURE — 99223 1ST HOSP IP/OBS HIGH 75: CPT

## 2021-05-20 PROCEDURE — 93010 ELECTROCARDIOGRAM REPORT: CPT

## 2021-05-20 PROCEDURE — 99291 CRITICAL CARE FIRST HOUR: CPT | Mod: CS

## 2021-05-20 PROCEDURE — 93880 EXTRACRANIAL BILAT STUDY: CPT | Mod: 26

## 2021-05-20 PROCEDURE — 83036 HEMOGLOBIN GLYCOSYLATED A1C: CPT

## 2021-05-20 PROCEDURE — 92523 SPEECH SOUND LANG COMPREHEN: CPT | Mod: GN

## 2021-05-20 PROCEDURE — 80061 LIPID PANEL: CPT

## 2021-05-20 PROCEDURE — 93880 EXTRACRANIAL BILAT STUDY: CPT

## 2021-05-20 PROCEDURE — 87086 URINE CULTURE/COLONY COUNT: CPT

## 2021-05-20 PROCEDURE — 87077 CULTURE AEROBIC IDENTIFY: CPT

## 2021-05-20 RX ORDER — ATORVASTATIN CALCIUM 80 MG/1
40 TABLET, FILM COATED ORAL AT BEDTIME
Refills: 0 | Status: DISCONTINUED | OUTPATIENT
Start: 2021-05-20 | End: 2021-05-20

## 2021-05-20 RX ORDER — PANTOPRAZOLE SODIUM 20 MG/1
40 TABLET, DELAYED RELEASE ORAL
Refills: 0 | Status: DISCONTINUED | OUTPATIENT
Start: 2021-05-20 | End: 2021-05-21

## 2021-05-20 RX ORDER — ATORVASTATIN CALCIUM 80 MG/1
80 TABLET, FILM COATED ORAL AT BEDTIME
Refills: 0 | Status: DISCONTINUED | OUTPATIENT
Start: 2021-05-20 | End: 2021-05-21

## 2021-05-20 RX ORDER — ASPIRIN/CALCIUM CARB/MAGNESIUM 324 MG
325 TABLET ORAL ONCE
Refills: 0 | Status: COMPLETED | OUTPATIENT
Start: 2021-05-20 | End: 2021-05-20

## 2021-05-20 RX ORDER — CHOLECALCIFEROL (VITAMIN D3) 125 MCG
1000 CAPSULE ORAL DAILY
Refills: 0 | Status: DISCONTINUED | OUTPATIENT
Start: 2021-05-20 | End: 2021-05-21

## 2021-05-20 RX ORDER — CLOPIDOGREL BISULFATE 75 MG/1
75 TABLET, FILM COATED ORAL DAILY
Refills: 0 | Status: DISCONTINUED | OUTPATIENT
Start: 2021-05-20 | End: 2021-05-21

## 2021-05-20 RX ORDER — LORATADINE 10 MG/1
10 TABLET ORAL DAILY
Refills: 0 | Status: DISCONTINUED | OUTPATIENT
Start: 2021-05-20 | End: 2021-05-21

## 2021-05-20 RX ORDER — FAMOTIDINE 10 MG/ML
20 INJECTION INTRAVENOUS DAILY
Refills: 0 | Status: DISCONTINUED | OUTPATIENT
Start: 2021-05-20 | End: 2021-05-20

## 2021-05-20 RX ORDER — ASPIRIN/CALCIUM CARB/MAGNESIUM 324 MG
81 TABLET ORAL DAILY
Refills: 0 | Status: DISCONTINUED | OUTPATIENT
Start: 2021-05-21 | End: 2021-05-21

## 2021-05-20 RX ADMIN — ATORVASTATIN CALCIUM 80 MILLIGRAM(S): 80 TABLET, FILM COATED ORAL at 22:21

## 2021-05-20 RX ADMIN — Medication 325 MILLIGRAM(S): at 13:50

## 2021-05-20 NOTE — ED ADULT TRIAGE NOTE - CHIEF COMPLAINT QUOTE
pt was referred by neurologist for possible stroke like s/s including severe headache yesterday radiating down to his left shoulder and noticed a facial droop this morning at 0900.  Code stroke called by Nadya.

## 2021-05-20 NOTE — CONSULT NOTE ADULT - SUBJECTIVE AND OBJECTIVE BOX
Patient is a 75y old  Male who presents with a chief complaint of right sided facial droop     HPI: Patient is a very pleasant 75 year old man with a PMH of stroke secondary to carotid artery direction in 2008, he has residual left sided weakness in the face, arm and leg and ambulates with a cane at baseline. He presented to the ED today c/o RIGHT sided facial droop which he states he noticed at 8am when he tried to take his pills the the water spilled out of his mouth on the right side. He waited and called his PCP, Dr. Mendieta, he was instructed to go to the emergency room. Pt was taken from the pivot area to CT.   Initial CT of the head showed the old right sided stroke but now acute stroke or hemorrhage. On exam pt had no right sided facial weakness, speech was intact, and right side was 5/5 with no drift, no dysmetria. NIHSS 6 but all from residual left sided symptoms. Without residual symptoms pt has an NIHSS 0       PAST MEDICAL & SURGICAL HISTORY:  CVA (Cerebral Infarction)  2008   GERD   Migraine Headache  Diverticulitis, Intestine Large   Hip Fracture  S/P Colon Resection    FAMILY HISTORY:  Social Hx:  Nonsmoker, no drug or alcohol use    MEDICATIONS:  Home meds reviewed  Pt on Plavix     Allergies:  heparin    ROS: Pertinent positives in HPI, all other ROS were reviewed and are negative.      Vital Signs Last 24 Hrs  T(C): 36.6 (20 May 2021 12:03), Max: 36.6 (20 May 2021 12:03)  T(F): 97.9 (20 May 2021 12:03), Max: 97.9 (20 May 2021 12:03)  HR: 92 (20 May 2021 12:03) (92 - 92)  BP: 152/84 (20 May 2021 12:03) (152/84 - 152/84)  BP(mean): --  RR: 17 (20 May 2021 12:03) (17 - 17)  SpO2: 94% (20 May 2021 12:03) (94% - 94%)        Constitutional: awake and alert.  HEENT: PERRLA, EOMI,   Neck: Supple.  Respiratory: Breath sounds are clear bilaterally  Cardiovascular: S1 and S2, regular / irregular rhythm  Gastrointestinal: soft, nontender  Extremities:  no edema  Vascular: Caritid Bruit - no  Musculoskeletal: no joint swelling/tenderness, no abnormal movements  Skin: No rashes    Neurological exam:  HF: A x O x 3. Appropriately interactive, normal affect. Speech fluent, No Aphasia or paraphasic errors. Naming /repetition intact   CN: HARMAN, EOMI, VFF, facial sensation normal, no NLFD, tongue midline, Palate moves equally, SCM equal bilaterally  Motor: No pronator drift, Strength 5/5 in all 4 ext, normal bulk and tone, no tremor, rigidity or bradykinesia.    Sens: Intact to light touch / PP/ VS/ JS    Reflexes: Symmetric and normal . BJ 2+, BR 2+, KJ 2+, AJ 2+, downgoing toes b/l  Coord:  No FNFA, dysmetria, ANITA intact   Gait/Balance: Normal/Cannot test    NIHSS:          Labs:                        15.1   8.50  )-----------( 130      ( 20 May 2021 12:14 )             44.8       RADIOLOGY:  < from: CT Brain Stroke Protocol (05.20.21 @ 12:28) >  IMPRESSION:    1)  large old right MCA infarct with extensive gliosis and encephalomalacia in the right basal ganglia and right frontal lobe. No acute abnormality or hemorrhagic lesion noted. Similar findings noted on prior CT.  2)  follow-up MR imaging may be considered for further assessment       Patient is a 75y old  Male who presents with a chief complaint of right sided facial droop     HPI: Patient is a very pleasant 75 year old man with a PMH of stroke secondary to carotid artery direction in 2008, he has residual left sided weakness in the face, arm and leg and ambulates with a cane at baseline. He presented to the ED today c/o RIGHT sided facial droop which he states he noticed at 8am when he tried to take his pills the the water spilled out of his mouth on the right side. He waited and called his PCP, Dr. Mendieta, he was instructed to go to the emergency room. Pt was taken from the pivot area to CT. He also noted this AM a left sided headache, centered around his left jaw. No ear pain, no change in hearing, no change in taste, no diff talking or swallowing.  Initial CT of the head showed the old right sided stroke but now acute stroke or hemorrhage. On exam pt had no right sided facial weakness, speech was intact, and right side was 5/5 with no drift, no dysmetria. NIHSS 6 but all from residual left sided symptoms. Without residual symptoms pt has an NIHSS 0       PAST MEDICAL & SURGICAL HISTORY:  CVA (Cerebral Infarction)  2008   GERD   Migraine Headache  Diverticulitis, Intestine Large   Hip Fracture  S/P Colon Resection    FAMILY HISTORY:  Social Hx:  Nonsmoker, no drug or alcohol use    MEDICATIONS:  Home meds reviewed  Pt on Plavix     Allergies:  heparin    ROS: Pertinent positives in HPI, all other ROS were reviewed and are negative.      Vital Signs Last 24 Hrs  T(C): 36.6 (20 May 2021 12:03), Max: 36.6 (20 May 2021 12:03)  T(F): 97.9 (20 May 2021 12:03), Max: 97.9 (20 May 2021 12:03)  HR: 92 (20 May 2021 12:03) (92 - 92)  BP: 152/84 (20 May 2021 12:03) (152/84 - 152/84)  RR: 17 (20 May 2021 12:03) (17 - 17)  SpO2: 94% (20 May 2021 12:03) (94% - 94%)        Constitutional: awake and alert.  HEENT: PERRLA, EOMI,   Neck: Supple.  Respiratory: Breath sounds are clear bilaterally  Cardiovascular: S1 and S2, regular / irregular rhythm  Gastrointestinal: soft, nontender  Extremities:  no edema  Vascular: Caritid Bruit - no  Musculoskeletal: no joint swelling/tenderness, no abnormal movements  Skin: No rashes    Neurological exam:  HF: A x O x 3. Appropriately interactive, normal affect. Speech fluent, No Aphasia or paraphasic errors. Naming /repetition intact   CN: HARMAN, EOMI, VFF, facial sensation normal, no NLFD, tongue midline, Palate moves equally, SCM equal bilaterally  Motor: No pronator drift, Strength 5/5 in RUE/RLE, on LUE ~ 4/5, LLE 4/5, increased tone on left, LUE contracted at wrist.     Sens: Intact to light touch / PP/ VS/ JS    Reflexes: Symmetric and normal . BJ 2+, BR 2+, KJ 2+, AJ 2+, downgoing toes b/l  Coord:  No FNFA, dysmetria, ANITA intact   Gait/Balance: Normal/Cannot test    NIHSS:      Labs:                        15.1   8.50  )-----------( 130      ( 20 May 2021 12:14 )             44.8       RADIOLOGY:  < from: CT Angio Head w/ IV Cont (05.20.21 @ 12:28) >  FINDINGS:   No previous examinations are available for review.    The RIGHT carotid circulation demonstrates a high-grade (80-90%) stenosis of the RIGHT internal carotid artery 1.2 cm from the bifurcation. There is a focal intimal flap at the level of the narrowing which may reflect an ulcerated plaque. There is irregularity of the cervical internal carotid artery with tortuosity. This finding may be due to prior recanalized dissection. The RIGHT external carotid arteries patent.    The LEFT carotid circulation demonstrates a patent LEFT internal carotid artery with tortuosity. The LEFT external carotid artery is patent.    The vertebral arteries are patent. The LEFT vertebral artery is patent. The origins of the great vessels are patent. Note that there is a retroesophageal RIGHT subclavian artery    The intracranial circulation is intact without aneurysm, vascular malformation or abnormal vessel termination. The RIGHT internal carotid artery and RIGHT middle and anterior cerebral arteries are small in caliber likely due to the proximal narrowing of the vessel.The anterior communicating artery is patent.    The brain demonstrates demonstrates large old infarction in the RIGHT basal ganglia extending into the RIGHT corona radiata and RIGHT centrum semiovale. No acute cerebral cortical infarct is seen.  No intracranial hemorrhage is found.  The ventricles show compensatory enlargement of the RIGHT lateral ventricle.      The orbits are unremarkable.  The paranasal sinuses are clear.  The nasal cavity remains intact.  The nasopharynx is symmetric.  The central skull base, petrous temporal bones and calvarium remain intact.    Perfusion parameters are reported as follows:    CBF < 30%: 4 mL  TMax > 6s: 0 mL  Mismatch volume: -4 mL  Mismatch ratio: 0    Perfusion imaging predicted core infarct of 4 ml within the RIGHT MCA Territory. Based on the perfusion mismatch, there is a predicted volume of 0 ml of critically hypoperfused tissue at risk.      IMPRESSION:        1.   Right carotid system:  high-grade (80-90%) stenosis of the RIGHT internal carotid artery 1.2 cm from the bifurcation. There is afocal intimal flap at the level of the narrowing which may reflect an ulcerated plaque. There is irregularity of the cervical internal carotid artery with tortuosity. This finding may be due to prior recanalized dissection.        2.   Left carotid system:  No hemodynamically significant stenosis.        3.   Intracranial circulation:  RIGHT ICA, MCA and VIKRAM are smaller in caliber due to more proximal stenosis        4.   Brain:  large old infarction in the RIGHT basal ganglia extending into the RIGHT corona radiata and RIGHT centrum semiovale.        5. Perfusion: Tiny RIGHT parietal white matter core infarct. No hypoperfused tissue at risk is identified..    < end of copied text >    < from: CT Brain Stroke Protocol (05.20.21 @ 12:28) >  IMPRESSION:    1)  large old right MCA infarct with extensive gliosis and encephalomalacia in the right basal ganglia and right frontal lobe. No acute abnormality or hemorrhagic lesion noted. Similar findings noted on prior CT.  2)  follow-up MR imaging may be considered for further assessment

## 2021-05-20 NOTE — CONSULT NOTE ADULT - ASSESSMENT
75 year old male with complaints of right sided facial weakness, consulted for high grade stenosis of RIght internal carotid artery, currently stable  - d/w Dr. Hdez  - Right carotid stenosis unlikely to be causing right sided symptoms  - recommend cardiac workup and outpatient follow up for carotid disease

## 2021-05-20 NOTE — CONSULT NOTE ADULT - ASSESSMENT
Patient is a very pleasant 75 year old man with a PMH of stroke secondary to carotid artery direction in 2008, he has residual left sided weakness in the face, arm and leg and ambulates with a cane at baseline. He presented to the ED today c/o RIGHT sided facial droop which he states he noticed at 8am when he tried to take his pills the the water spilled out of his mouth on the right side. He waited and called his PCP, Dr. Mendieta, he was instructed to go to the emergency room. Pt was taken from the pivot area to CT.   Initial CT of the head showed the old right sided stroke but now acute stroke or hemorrhage. On exam pt had no right sided facial weakness, speech was intact, and right side was 5/5 with no drift, no dysmetria. NIHSS 6 but all from residual left sided symptoms. Without residual symptoms pt has an NIHSS 0   Pt not a candidate for t-PA as symptoms have resolved and he is now NIHSS 0     #possible TIA  Admit to medicine for MRI  consider adding aspirin     Discussed with Dr. Meier  Patient is a very pleasant 75 year old man with a PMH of stroke secondary to carotid artery direction in 2008, he has residual left sided weakness in the face, arm and leg and ambulates with a cane at baseline. He presented to the ED today c/o RIGHT sided facial droop which he states he noticed at 8am when he tried to take his pills the the water spilled out of his mouth on the right side.  c/o left sided headache.  Initial CT of the head showed the old right sided stroke but no acute stroke or hemorrhage. On exam pt had no right sided facial weakness, speech was intact, and right side was 5/5 with no drift, no dysmetria. NIHSS 6 but all from residual left sided symptoms. Without residual symptoms pt has an NIHSS 0   Pt not a candidate for t-PA as symptoms have resolved and he is now NIHSS 0   #possible TIA. Right ICA stenosis  Admit to medicine for MRI, monitored unit  neuro checks q 4  consider adding aspirin 81 mg po qd  statin  f/u B/P, aim systolic <140  vascular surgery evaluation    Discussed with Dr. Erazo

## 2021-05-20 NOTE — CONSULT NOTE ADULT - SUBJECTIVE AND OBJECTIVE BOX
CC: right sided facial weakness since this am    HPI:  75 year old male with PMHx of CVA - with L sided deficit () presents with c/o R facial weakness. Patient states last night he had a bad left sided headache. He noticed right facial weakness when trying to take medications and water was dripping out from right side of mouth.  Patient states he felt weakness and some numbness. Patient has left sided weakness from previous stroke - uses cane to ambulate and left hand is contracted - weakness has not worsened.  Patient also states that he has known right sided carotid disease with a stenosis of 80-90%. Also complains of some blurry vision for the past several weeks. Denies fevers, dizziness, chills, SOB, CP, N/V.     in ED, initial Head CT showed old right sided CVA but no acute stroke or hemorrhage.  ASA given.  Neurology consulted.  Without residual symptoms, pt has an NIHSS 0 and tPA was not administered.      was given water with ASA to re-evaluate dysphagia screening.  denies difficulty swallowing, no coughing and able to manage secretions.    PMHx:  CVA-L sided deficiet ();  GERD;  diverticulitis;  migraine HA;  hip fracture.    ROS:      all other review of systems are negative unless indicated above.    PAST MEDICAL & SURGICAL HISTORY:  CVA (Cerebral Infarction) (ICD9 434.91)    GERD (Gastroesophageal Reflux Disease) (ICD9 530.81)    Migraine Headache (ICD9 346.90)    Diverticulitis, Intestine Large (ICD9 562.11)    Hip Fracture (ICD9 820.8) s/p repair     S/P Colon Resection (ICD9 V45.89)     s/p lap anton         Allergies    heparin (Unknown)    Intolerances    Home Medications:  Allegra 24 Hour Allergy oral tablet: 1 tab(s) orally once a day (20 May 2021 14:26)  atorvastatin 10 mg oral tablet: 1 tab(s) orally once a day (20 May 2021 14:26)  clopidogrel 75 mg oral tablet: 1 tab(s) orally once a day (20 May 2021 14:26)  l-methylfolate 15 mg oral tablet: 1 tab(s) orally once a day (20 May 2021 14:26)  PARoxetine 20 mg oral tablet: 1 tab(s) orally once a day (20 May 2021 14:26)  Pepcid 20 mg oral tablet: 1 tab(s) orally once a day (20 May 2021 14:26)  potassium citrate 10 mEq oral tablet, extended release: 1 tab(s) orally once a day (20 May 2021 14:26)  Prolia 60 mg/mL subcutaneous solution: subcutaneous every 6 months (20 May 2021 14:26)  Toviaz 8 mg oral tablet, extended release: 1 tab(s) orally once a day (20 May 2021 14:26)  Vitamin D3 1000 intl units (25 mcg) oral tablet: 1 tab(s) orally once a day (20 May 2021 14:26)      Social History:  ·	.  ·	retired.  ·	3 children.  ·	denied EtOH or tobacco.    FAMILY HISTORY:  ·	brother:  MM.  ·	father:  prostate CA.  ·	mother:  CAD.    Vital Signs Last 24 Hrs  T(C): 36.6 (20 May 2021 15:02), Max: 36.6 (20 May 2021 12:03)  T(F): 97.9 (20 May 2021 15:02), Max: 97.9 (20 May 2021 12:03)  HR: 89 (20 May 2021 15:30) (89 - 92)  BP: 134/87 (20 May 2021 15:30) (129/89 - 152/84)  BP(mean): 103 (20 May 2021 15:02) (103 - 103)  RR: 18 (20 May 2021 15:30) (16 - 18)  SpO2: 100% (20 May 2021 15:30) (94% - 100%)    Constitutional: NAD.   HEENT: PERRL, EOMI, MMM.  Neck: Soft and supple, No carotid bruit, No JVD  Respiratory: Breath sounds are clear bilaterally, No wheezing, rales or rhonchi  Cardiovascular: S1 and S2, regular rate and rhythm  Gastrointestinal: Bowel Sounds present, soft, nontender, nondistended, no guarding, no rebound, no mass.  Extremities: No peripheral edema  Vascular: 2+ peripheral pulses  Neuro/Musculoskeletal: A/O x 3. no right sided facial weakness noted - smile symmetric, tongue midline, sensation in tact.  No pronator drift, Strength 5/5 in RUE/RLE, on LUE ~ 4/5, LLE 4/5, increased tone on left, LUE contracted at wrist.     Skin:  no visible rashes.                15.1   8.50  )-----------( 130      ( 20 May 2021 12:14 )             44.8       PT/INR - ( 20 May 2021 13:22 )   PT: 13.1 sec;   INR: 1.14 ratio         PTT - ( 20 May 2021 13:22 )  PTT:29.8 sec        140  |  108  |  22  ----------------------------<  85  4.1   |  25  |  0.71    Ca    8.8      20 May 2021 13:22    TPro  6.4  /  Alb  3.3  /  TBili  0.4  /  DBili  x   /  AST  38<H>  /  ALT  44  /  AlkPhos  71        LIVER FUNCTIONS - ( 20 May 2021 13:22 )  Alb: 3.3 g/dL / Pro: 6.4 gm/dL / ALK PHOS: 71 U/L / ALT: 44 U/L / AST: 38 U/L / GGT: x             CARDIAC MARKERS ( 20 May 2021 13:22 )  <0.015 ng/mL / x     / x     / x     / x        < from: Xray Chest 1 View- PORTABLE-Urgent (21 @ 13:03) >  IMPRESSION: Slight density left base laterally which appears chronic.    < end of copied text >  < from: CT Angio Head w/ IV Cont (21 @ 12:28) >  IMPRESSION:        1.   Right carotid system:  high-grade (80-90%) stenosis of the RIGHT internal carotid artery 1.2 cm from the bifurcation. There is afocal intimal flap at the level of the narrowing which may reflect an ulcerated plaque. There is irregularity of the cervical internal carotid artery with tortuosity. This finding may be due to prior recanalized dissection.        2.   Left carotid system:  No hemodynamically significant stenosis.        3.   Intracranial circulation:  RIGHT ICA, MCA and VIKRAM are smaller in caliber due to more proximal stenosis        4.   Brain:  large old infarction in the RIGHT basal ganglia extending into the RIGHT corona radiata and RIGHT centrum semiovale.        5. Perfusion: Tiny RIGHT parietal white matter core infarct. No hypoperfused tissue at risk is identified..    < end of copied text >  < from: CT Brain Stroke Protocol (21 @ 12:28) >  IMPRESSION:    1)  large old right MCA infarct with extensive gliosis and encephalomalacia in the right basal ganglia and right frontal lobe. No acute abnormality or hemorrhagic lesion noted. Similar findings noted on prior CT.  2)  follow-up MR imaging may be considered for further assessment.    < end of copied text >       (20 May 2021 15:54)      PT/INR - ( 20 May 2021 13:22 )   PT: 13.1 sec;   INR: 1.14 ratio         PTT - ( 20 May 2021 13:22 )  PTT:29.8 sec  Urinalysis Basic - ( 20 May 2021 14:08 )    Color: Yellow / Appearance: Clear / S.010 / pH: x  Gluc: x / Ketone: Negative  / Bili: Negative / Urobili: Negative mg/dL   Blood: x / Protein: 15 mg/dL / Nitrite: Positive   Leuk Esterase: Trace / RBC: 0-2 /HPF / WBC 11-25   Sq Epi: x / Non Sq Epi: x / Bacteria: x          RADIOLOGY & ADDITIONAL STUDIES:    ASSESSMENT/PLAN:  Patient is a 75y old  Male who presents with a chief complaint of     -  -  -  -

## 2021-05-20 NOTE — ED ADULT NURSE REASSESSMENT NOTE - NS ED NURSE REASSESS COMMENT FT1
Spoke with Dr. Maradiaga from Neuro, he will come see pt in ED. No new orders at this time. pt denies any other complaints.

## 2021-05-20 NOTE — ED PROVIDER NOTE - OBJECTIVE STATEMENT
76 y/o male with a PMHx of CVA in 2008 with residual left sided weakness, diverticulitis, GERD, hip fracture, migraine CLARK presents ED for evaluation. Per wife at bedside, pt had a migraine last night. Pt took his morning meds around 8am, had water dripping from the right side of his mouth and noticed a right facial droop. Pt notes his last migraine was when he had his previous stroke. Denies weakness, difficulty speaking. No other complaints at this time. 74 y/o male with a PMHx of CVA in 2008 with residual left sided deficits, diverticulitis, GERD, hip fracture, migraine CLARK presents ED for evaluation. Per wife at bedside, pt had a migraine last night. Pt took his morning meds around 8am, had water dripping from the right side of his mouth and noticed a right facial droop. Pt notes his last migraine was when he had his previous stroke. Denies weakness, difficulty speaking. On Plavix. Allergies: Heparin. No other complaints at this time.

## 2021-05-20 NOTE — ED STATDOCS - PROGRESS NOTE DETAILS
Dane JARA for Dr. Covington: 74 y/o male with PMHX of Diverticulitis, GERD, CVA presents to the ED c/o R sided facial droop. States he noticed sx when he was drinking water after waking up today and the water was dripping out of his mouth. Pt also c/o headache radiating to jaw. No difficulty speaking. Code stroke initiated, Will send pt to main ED for further evaluation.

## 2021-05-20 NOTE — H&P ADULT - ASSESSMENT
75M.  CVA-L sided miguel (2008).  ambulates w/ a cane at baseline.  c/o R facial weakness.  was in his usual state of health until 8AM.  noted water dripping from the R side of his mouth when taking this morning medications.    PMHx:  CVA-L sided miguel (2008);  GERD;  diverticulitis;  migraine HA;  hip fracture.    CVA.  - telemetry monitoring.  - CTA:  tiny R parietal white matter core infarct.  high-grade TOMMY stenosis.  irregularity of the cervical internal carotid artery.  may be due to prior recanalized dissection.  - MR.  - TTE.  - neuro checks q4.  - BP control.  target SBP <= 140mmHg.  - ASA 81mg po qd.  - PLX 75mg po qd.  - increase to high intensity STATIN:  atorvastatin 40mg po qhs.  - PT.  - speech pathology.  - Neurology.  - VSx.    DVT prophylaxis.  -    communication.  - ED RN.  - NSxPA.    disposition.  - telemetry joya.   75M.  CVA-L sided miguel (2008).  ambulates w/ a cane at baseline.  c/o R facial weakness.  was in his usual state of health until 8AM.  noted water dripping from the R side of his mouth when taking this morning medications.    PMHx:  CVA-L sided miguel (2008);  GERD;  diverticulitis;  migraine HA;  hip fracture.    CVA.  - telemetry monitoring.  - CTA:  tiny R parietal white matter core infarct.  high-grade TOMMY stenosis.  irregularity of the cervical internal carotid artery.  may be due to prior recanalized dissection.  - MR.  - TTE.  - neuro checks q4.  - BP control.  target SBP <= 140mmHg.  - continue PLX 75mg po qd.  - add ASA 81mg po qd.  - increase to high intensity STATIN:  atorvastatin 80mg po qhs.  - DVT prophylaxis.  - PT.  - speech pathology.  - Neurology.  - VSx.    DVT prophylaxis.  - SCD.  - heparin allergy.    communication.  - ED RN.  - NSxPA.    disposition.  - telemetry joya.   75M.  CVA-L sided deficiet (2008).  ambulates w/ a cane at baseline.  c/o R facial weakness.  was in his usual state of health until 8AM.  noted water dripping from the R side of his mouth when taking this morning medications.    PMHx:  CVA-L sided deficiet (2008);  GERD;  diverticulitis;  migraine HA;  hip fracture.    possible TIA.  hx CVA-L sided deficiet (2008).  - telemetry monitoring.  - A1C.  - lipid panel.  - CTA:  tiny R parietal white matter core infarct.  high-grade TOMMY stenosis.  irregularity of the cervical internal carotid artery.  may be due to prior recanalized dissection.  - MR.  - TTE.  - neuro checks q4.  - BP control.  target SBP <= 140mmHg.  - continue PLX 75mg po qd.  - add ASA 81mg po qd.  - increase to high intensity STATIN:  atorvastatin 80mg po qhs.  - DVT prophylaxis.  - PT.  - speech pathology.  - Neurology.  - VSx.    DVT prophylaxis.  - SCD.  - heparin allergy-patient can not recall precisely the reaction, but states "it was related to the blood" and was documented during his initial stroke in 2008.  old documentation reviewed.  nothing more specific found.    communication.  - ED RN.    disposition.  - telemetry joya.

## 2021-05-20 NOTE — ED ADULT NURSE REASSESSMENT NOTE - NS ED NURSE REASSESS COMMENT FT1
pt initial dysphagia screening was marked as fail. pt did not have any obvious difficulty. when asked if he had any trouble/ dripping pt stated "I had a little water drip onto my beard". pt was then given water with aspirin to re-eval dysphagia screening. pt denies any difficulty swallowing and had no water drip down beard. no coughing and pt was able to manage secretions.

## 2021-05-20 NOTE — ED PROVIDER NOTE - CLINICAL SUMMARY MEDICAL DECISION MAKING FREE TEXT BOX
76 y/o male hx of CVA with left sided residual deficits. No new deficits at this time. Code stroke activated as pt with difficulty swallowing at 8am. Plan: EKG, chest XR, labs, CT, neuro consult.

## 2021-05-20 NOTE — ED ADULT NURSE REASSESSMENT NOTE - NS ED NURSE REASSESS COMMENT FT1
pt at this time made RN aware he has right hand tingling and left sided headache. Pt previously denies these symptoms. Dr. Damico aware, neuro aware.

## 2021-05-20 NOTE — ED ADULT NURSE NOTE - OBJECTIVE STATEMENT
pt was referred by neurologist for possible stroke like s/s including severe headache yesterday radiating down to his left shoulder and noticed a facial droop this morning at 0800. hx- stroke 2008 with left sided residual.

## 2021-05-20 NOTE — ED PROVIDER NOTE - PROGRESS NOTE DETAILS
radiology called about ct perfusion - acute infarct, I called and made dr lópez aware, she is taking care of patient Latasha LU: ASA given; rec from neuro is for admission; endorsed to Dr. Damon at this time.

## 2021-05-20 NOTE — H&P ADULT - HISTORY OF PRESENT ILLNESS
CC:  Patient is a 75y old  Male who presents with a chief complaint of R facial weakness.    HPI:  75M.  CVA-L sided deficiet (2008).  ambulates w/ a cane at baseline.  c/o R facial weakness.  was in his usual state of health until 8AM.  noted water dripping from the R side of his mouth when taking this morning medications.    in ED, initial HCT (+) old right sided CVA but no acute stroke or hemorrhage.  ASA given.  Neurology consulted.  Without residual symptoms, pt has an NIHSS 0 and tPA was not administered.      was given water with ASA to re-evaluate dysphagia screening.  denies difficulty swallowing, no coughing and able to manage secretions.    PMHx:  CVA-L sided deficiet (2008);  GERD;  diverticulitis;  migraine HA;  hip fracture.    ROS:      all other review of systems are negative unless indicated above.    PAST MEDICAL & SURGICAL HISTORY:  CVA (Cerebral Infarction) (ICD9 434.91)    GERD (Gastroesophageal Reflux Disease) (ICD9 530.81)    Migraine Headache (ICD9 346.90)    Diverticulitis, Intestine Large (ICD9 562.11)    Hip Fracture (ICD9 820.8)    S/P Colon Resection (ICD9 V45.89)        Allergies    heparin (Unknown)    Intolerances        Home Medications:  Allegra 24 Hour Allergy oral tablet: 1 tab(s) orally once a day (20 May 2021 14:26)  atorvastatin 10 mg oral tablet: 1 tab(s) orally once a day (20 May 2021 14:26)  clopidogrel 75 mg oral tablet: 1 tab(s) orally once a day (20 May 2021 14:26)  l-methylfolate 15 mg oral tablet: 1 tab(s) orally once a day (20 May 2021 14:26)  PARoxetine 20 mg oral tablet: 1 tab(s) orally once a day (20 May 2021 14:26)  Pepcid 20 mg oral tablet: 1 tab(s) orally once a day (20 May 2021 14:26)  potassium citrate 10 mEq oral tablet, extended release: 1 tab(s) orally once a day (20 May 2021 14:26)  Prolia 60 mg/mL subcutaneous solution: subcutaneous every 6 months (20 May 2021 14:26)  Toviaz 8 mg oral tablet, extended release: 1 tab(s) orally once a day (20 May 2021 14:26)  Vitamin D3 1000 intl units (25 mcg) oral tablet: 1 tab(s) orally once a day (20 May 2021 14:26)      Social History:  ·	.  ·	retired.  ·	3 children.  ·	denied EtOH or tobacco.    FAMILY HISTORY:  ·	brother:  MM.  ·	father:  prostate CA.  ·	mother:  CAD.    Vital Signs Last 24 Hrs  T(C): 36.6 (20 May 2021 15:02), Max: 36.6 (20 May 2021 12:03)  T(F): 97.9 (20 May 2021 15:02), Max: 97.9 (20 May 2021 12:03)  HR: 89 (20 May 2021 15:30) (89 - 92)  BP: 134/87 (20 May 2021 15:30) (129/89 - 152/84)  BP(mean): 103 (20 May 2021 15:02) (103 - 103)  RR: 18 (20 May 2021 15:30) (16 - 18)  SpO2: 100% (20 May 2021 15:30) (94% - 100%)    Constitutional: NAD.   HEENT: PERRL, EOMI, MMM.  Neck: Soft and supple, No carotid bruit, No JVD  Respiratory: Breath sounds are clear bilaterally, No wheezing, rales or rhonchi  Cardiovascular: S1 and S2, regular rate and rhythm, no murmur, rub or gallop.  Gastrointestinal: Bowel Sounds present, soft, nontender, nondistended, no guarding, no rebound, no mass.  Extremities: No peripheral edema  Vascular: 2+ peripheral pulses  Neurological: A/O x 3, no focal deficits  Musculoskeletal: 5/5 strength b/l upper and lower extremities  Skin:  no visible rashes.                15.1   8.50  )-----------( 130      ( 20 May 2021 12:14 )             44.8       PT/INR - ( 20 May 2021 13:22 )   PT: 13.1 sec;   INR: 1.14 ratio         PTT - ( 20 May 2021 13:22 )  PTT:29.8 sec    05-20    140  |  108  |  22  ----------------------------<  85  4.1   |  25  |  0.71    Ca    8.8      20 May 2021 13:22    TPro  6.4  /  Alb  3.3  /  TBili  0.4  /  DBili  x   /  AST  38<H>  /  ALT  44  /  AlkPhos  71  05-20      LIVER FUNCTIONS - ( 20 May 2021 13:22 )  Alb: 3.3 g/dL / Pro: 6.4 gm/dL / ALK PHOS: 71 U/L / ALT: 44 U/L / AST: 38 U/L / GGT: x             CARDIAC MARKERS ( 20 May 2021 13:22 )  <0.015 ng/mL / x     / x     / x     / x        < from: Xray Chest 1 View- PORTABLE-Urgent (05.20.21 @ 13:03) >  IMPRESSION: Slight density left base laterally which appears chronic.    < end of copied text >  < from: CT Angio Head w/ IV Cont (05.20.21 @ 12:28) >  IMPRESSION:        1.   Right carotid system:  high-grade (80-90%) stenosis of the RIGHT internal carotid artery 1.2 cm from the bifurcation. There is afocal intimal flap at the level of the narrowing which may reflect an ulcerated plaque. There is irregularity of the cervical internal carotid artery with tortuosity. This finding may be due to prior recanalized dissection.        2.   Left carotid system:  No hemodynamically significant stenosis.        3.   Intracranial circulation:  RIGHT ICA, MCA and VIKRAM are smaller in caliber due to more proximal stenosis        4.   Brain:  large old infarction in the RIGHT basal ganglia extending into the RIGHT corona radiata and RIGHT centrum semiovale.        5. Perfusion: Tiny RIGHT parietal white matter core infarct. No hypoperfused tissue at risk is identified..    < end of copied text >  < from: CT Brain Stroke Protocol (05.20.21 @ 12:28) >  IMPRESSION:    1)  large old right MCA infarct with extensive gliosis and encephalomalacia in the right basal ganglia and right frontal lobe. No acute abnormality or hemorrhagic lesion noted. Similar findings noted on prior CT.  2)  follow-up MR imaging may be considered for further assessment.    < end of copied text >

## 2021-05-20 NOTE — ED ADULT NURSE REASSESSMENT NOTE - NS ED NURSE REASSESS COMMENT FT1
received pt from evening ILDA Montez. Pt is a&0x4, has hx of stroke with left sided weakness to the arm and left hand is contracted. pt comes in for right sided weakness, nuero assessment performed at bedside pt does not show any right sided weakness. denies headache or blurry vision, pt walks with a cane with assistance. lives at home with family

## 2021-05-20 NOTE — ED ADULT NURSE NOTE - NSIMPLEMENTINTERV_GEN_ALL_ED
Implemented All Fall with Harm Risk Interventions:  [place] to call system. Call bell, personal items and telephone within reach. Instruct patient to call for assistance. Room bathroom lighting operational. Non-slip footwear when patient is off stretcher. Physically safe environment: no spills, clutter or unnecessary equipment. Stretcher in lowest position, wheels locked, appropriate side rails in place. Provide visual cue, wrist band, yellow gown, etc. Monitor gait and stability. Monitor for mental status changes and reorient to person, place, and time. Review medications for side effects contributing to fall risk. Reinforce activity limits and safety measures with patient and family. Provide visual clues: red socks.

## 2021-05-20 NOTE — CONSULT NOTE ADULT - ATTENDING COMMENTS
Patient seen and examined  75 year old man with remote history of right hemispheric CVA secondary to acute right ICA dissection in 2008  Patient now admitted with right facial weakness (left hemispheric symptoms)  CT brain and MRI shows no acute right cerebral infarct.  CTA neck shows an atretic, chronically recanalized right ICA.  Patient does not require carotid revascularization ans his new neurological deficits are unrelated to his chronic right ICA disease  Continue antiplatelet therapy and BP control
75 year old man c/o drooling from mouth since this AM, old right MCA CVA. exam c/w prior CVA. + right ICA stenosis>80.  agree with above.

## 2021-05-21 ENCOUNTER — TRANSCRIPTION ENCOUNTER (OUTPATIENT)
Age: 76
End: 2021-05-21

## 2021-05-21 VITALS
HEART RATE: 81 BPM | SYSTOLIC BLOOD PRESSURE: 122 MMHG | OXYGEN SATURATION: 99 % | DIASTOLIC BLOOD PRESSURE: 89 MMHG | TEMPERATURE: 98 F | RESPIRATION RATE: 18 BRPM

## 2021-05-21 LAB
ANION GAP SERPL CALC-SCNC: 7 MMOL/L — SIGNIFICANT CHANGE UP (ref 5–17)
BUN SERPL-MCNC: 17 MG/DL — SIGNIFICANT CHANGE UP (ref 7–23)
CALCIUM SERPL-MCNC: 9.2 MG/DL — SIGNIFICANT CHANGE UP (ref 8.5–10.1)
CHLORIDE SERPL-SCNC: 108 MMOL/L — SIGNIFICANT CHANGE UP (ref 96–108)
CHOLEST SERPL-MCNC: 129 MG/DL — SIGNIFICANT CHANGE UP
CO2 SERPL-SCNC: 25 MMOL/L — SIGNIFICANT CHANGE UP (ref 22–31)
COVID-19 SPIKE DOMAIN AB INTERP: POSITIVE
COVID-19 SPIKE DOMAIN ANTIBODY RESULT: 54.8 U/ML — HIGH
CREAT SERPL-MCNC: 0.6 MG/DL — SIGNIFICANT CHANGE UP (ref 0.5–1.3)
GLUCOSE SERPL-MCNC: 90 MG/DL — SIGNIFICANT CHANGE UP (ref 70–99)
HCV AB S/CO SERPL IA: 0.21 S/CO — SIGNIFICANT CHANGE UP (ref 0–0.99)
HCV AB SERPL-IMP: SIGNIFICANT CHANGE UP
HDLC SERPL-MCNC: 59 MG/DL — SIGNIFICANT CHANGE UP
LIPID PNL WITH DIRECT LDL SERPL: 58 MG/DL — SIGNIFICANT CHANGE UP
NON HDL CHOLESTEROL: 69 MG/DL — SIGNIFICANT CHANGE UP
POTASSIUM SERPL-MCNC: 4.1 MMOL/L — SIGNIFICANT CHANGE UP (ref 3.5–5.3)
POTASSIUM SERPL-SCNC: 4.1 MMOL/L — SIGNIFICANT CHANGE UP (ref 3.5–5.3)
SARS-COV-2 IGG+IGM SERPL QL IA: 54.8 U/ML — HIGH
SARS-COV-2 IGG+IGM SERPL QL IA: POSITIVE
SODIUM SERPL-SCNC: 140 MMOL/L — SIGNIFICANT CHANGE UP (ref 135–145)
TRIGL SERPL-MCNC: 58 MG/DL — SIGNIFICANT CHANGE UP

## 2021-05-21 PROCEDURE — 99239 HOSP IP/OBS DSCHRG MGMT >30: CPT

## 2021-05-21 PROCEDURE — 99232 SBSQ HOSP IP/OBS MODERATE 35: CPT

## 2021-05-21 PROCEDURE — 93306 TTE W/DOPPLER COMPLETE: CPT | Mod: 26

## 2021-05-21 RX ORDER — ASPIRIN/CALCIUM CARB/MAGNESIUM 324 MG
1 TABLET ORAL
Qty: 0 | Refills: 0 | DISCHARGE
Start: 2021-05-21

## 2021-05-21 RX ORDER — ATORVASTATIN CALCIUM 80 MG/1
1 TABLET, FILM COATED ORAL
Qty: 0 | Refills: 0 | DISCHARGE

## 2021-05-21 RX ORDER — ACETAMINOPHEN 500 MG
650 TABLET ORAL EVERY 6 HOURS
Refills: 0 | Status: DISCONTINUED | OUTPATIENT
Start: 2021-05-21 | End: 2021-05-21

## 2021-05-21 RX ORDER — ATORVASTATIN CALCIUM 80 MG/1
1 TABLET, FILM COATED ORAL
Qty: 30 | Refills: 0
Start: 2021-05-21 | End: 2021-06-19

## 2021-05-21 RX ADMIN — PANTOPRAZOLE SODIUM 40 MILLIGRAM(S): 20 TABLET, DELAYED RELEASE ORAL at 05:21

## 2021-05-21 RX ADMIN — Medication 20 MILLIGRAM(S): at 10:08

## 2021-05-21 RX ADMIN — Medication 1000 UNIT(S): at 10:09

## 2021-05-21 RX ADMIN — Medication 81 MILLIGRAM(S): at 10:08

## 2021-05-21 RX ADMIN — CLOPIDOGREL BISULFATE 75 MILLIGRAM(S): 75 TABLET, FILM COATED ORAL at 10:08

## 2021-05-21 RX ADMIN — LORATADINE 10 MILLIGRAM(S): 10 TABLET ORAL at 10:08

## 2021-05-21 RX ADMIN — Medication 650 MILLIGRAM(S): at 13:37

## 2021-05-21 NOTE — DISCHARGE NOTE PROVIDER - CARE PROVIDERS DIRECT ADDRESSES
[email or web address],DirectAddress_Unknown,DirectAddress_Unknown

## 2021-05-21 NOTE — DISCHARGE NOTE NURSING/CASE MANAGEMENT/SOCIAL WORK - PATIENT PORTAL LINK FT
You can access the FollowMyHealth Patient Portal offered by [facility] by registering at the following website: [email or web address]. By joining [employer]’s FollowMyHealth portal, you will also be able to view your health information using other applications (apps) compatible with our system.

## 2021-05-21 NOTE — DISCHARGE NOTE PROVIDER - CARE PROVIDER_API CALL
Marcelino Erazo  INTERNAL MEDICINE  [address], [address]  [place] [address]  Phone: [phone]  Fax: [phone]  Established Patient  Follow Up Time: 2 weeks    Ann Self)  Vascular Surgery  [address] [place] [address]  Phone: [phone]  Fax: [phone]  Established Patient  Follow Up Time: 2 weeks    Noble Portillo  CARDIOVASCULAR DISEASE  [address] [place] [address]  Phone: [phone]  Fax: [phone]  Established Patient  Follow Up Time: 1 week

## 2021-05-21 NOTE — SWALLOW BEDSIDE ASSESSMENT ADULT - SWALLOW EVAL: RECOMMENDED DIET
SUGGEST A REGULAR TEXTURE DIET WITH THIN LIQUID CONSISTENCIES AS THE PATIENT APPEARED CLINICALLY TOLERANT OF THESE FOOD CONSISTENCIES FROM AN OROPHARYNGEAL SWALLOWING STANCE ON EXAM.

## 2021-05-21 NOTE — PROGRESS NOTE ADULT - ASSESSMENT
75 year old man with a PMH of stroke secondary to carotid artery direction in 2008, with residual left hemiparesis. He presented to the ED with RIGHT sided facial droop. MR head showed no acute changes. ? [place] palsy, mild. ? migraine event, ? TIA. right carotid stenosis not the patients origin for his presentation.  Suggest:  continue statin, plavix, asa.  f/u B/P, aim systolic <140  vascular surgery f/u  2D echo

## 2021-05-21 NOTE — PROGRESS NOTE ADULT - SUBJECTIVE AND OBJECTIVE BOX
HPI:  CC:  Patient is a 75y old  Male who presents with a chief complaint of R facial weakness.    HPI:  75 year old man with a PMH of stroke secondary to carotid artery direction in 2008, residual left sided weakness  arm and leg, migraine. He presented to the c/o RIGHT sided facial droop.He also noted this AM a left sided headache, centered around his left jaw. No ear pain, no change in hearing, no change in taste, no diff talking or swallowing.  Initial CT of the head showed the old right sided stroke but now acute stroke or hemorrhage.     PMHx:  CVA-L sided deficiet (2008);  GERD;  diverticulitis;  migraine HA;  hip fracture.    MEDICATIONS  (STANDING):  aspirin  chewable 81 milliGRAM(s) Oral daily  atorvastatin 80 milliGRAM(s) Oral at bedtime  cholecalciferol 1000 Unit(s) Oral daily  clopidogrel Tablet 75 milliGRAM(s) Oral daily  loratadine 10 milliGRAM(s) Oral daily  pantoprazole    Tablet 40 milliGRAM(s) Oral before breakfast  PARoxetine 20 milliGRAM(s) Oral daily    MEDICATIONS  (PRN):      Vital Signs Last 24 Hrs  T(C): 36.6 (21 May 2021 08:45), Max: 36.9 (20 May 2021 22:04)  T(F): 97.8 (21 May 2021 08:45), Max: 98.4 (20 May 2021 22:04)  HR: 81 (21 May 2021 08:45) (81 - 92)  BP: 122/89 (21 May 2021 08:45) (122/89 - 152/84)  BP(mean): 108 (20 May 2021 18:58) (103 - 108)  RR: 18 (21 May 2021 08:45) (16 - 18)  SpO2: 99% (21 May 2021 08:45) (94% - 100%)  Neurological Exam:  HF: A x O x 3. Appropriately interactive, normal affect. Speech fluent, naming /repetition intact   CN: HARMAN, EOMI, VFF, facial sensation normal, no NLFD, tongue midline, Palate moves equally, SCM equal bilaterally  Motor: No pronator drift, Strength 5/5 in RUE/RLE, on LUE ~ 4/5, LLE 4/5, increased tone on left, LUE contracted at wrist.     Sens: Intact to light touch / PP/ VS/ JS    Reflexes: Symmetric and normal . downgoing toes b/l  Coord:  No FNFA, dysmetria  Gait/Balance:Cannot test          < from: CT Angio Head w/ IV Cont (05.20.21 @ 12:28) >  IMPRESSION:        1.   Right carotid system:  high-grade (80-90%) stenosis of the RIGHT internal carotid artery 1.2 cm from the bifurcation. There is afocal intimal flap at the level of the narrowing which may reflect an ulcerated plaque. There is irregularity of the cervical internal carotid artery with tortuosity. This finding may be due to prior recanalized dissection.        2.   Left carotid system:  No hemodynamically significant stenosis.        3.   Intracranial circulation:  RIGHT ICA, MCA and VIKRAM are smaller in caliber due to more proximal stenosis        4.   Brain:  large old infarction in the RIGHT basal ganglia extending into the RIGHT corona radiata and RIGHT centrum semiovale.        5. Perfusion: Tiny RIGHT parietal white matter core infarct. No hypoperfused tissue at risk is identified..    < end of copied text >      < from: US Duplex Carotid Arteries Complete, Bilateral (05.20.21 @ 18:45) >  Antegrade flow is noted within both vertebral arteries.    IMPRESSION:    No color flow or spectral flow seen within the distal right internal carotid artery likely corresponding to known high-grade stenosis seen on CTA of same day. No hemodynamic significant stenosis on the left.    < end of copied text >  < from: MR Head No Cont (05.20.21 @ 23:53) >        FINDINGS:   MRI dated 06/26/2012 and CT scan dated 05/20/2021 are available for review.    The brain demonstrates extensive encephalomalacia and gliosis in the RIGHT basal ganglia and corona radiata with associated compensatory enlargement of the RIGHT lateral ventricle and Wallerian degeneration in the RIGHT cerebral peduncle, unchanged. Scattered white matter ischemia in the bifrontal and RIGHT parietal subcortical white matter. No acute cerebral cortical infarct is found.   No intracranial hemorrhage is recognized. No mass effect is found in the brain. Mild atrophy.    There is no midline shift. The vertebral and internal carotid arteries demonstrate expected flow voids indicating their patency.    The orbits are unremarkable.  The paranasal sinuses are clear.  The nasal cavity appears intact.  The nasopharynx is symmetric.  The central skull base and temporal bones are intact.  The calvarium appears unremarkable.    IMPRESSION:   Extensive encephalomalacia and gliosis in the RIGHT basal ganglia and corona radiata with associated compensatory enlargement of the RIGHT lateral ventricle and Wallerian degeneration in the RIGHT cerebral peduncle, unchanged. Scattered white matter ischemia in the bifrontal and RIGHT parietal subcortical white matter.    < end of copied text >

## 2021-05-21 NOTE — DISCHARGE NOTE PROVIDER - HOSPITAL COURSE
1. 75M.  CVA-L sided deficiet (2008).  ambulates w/ a cane at baseline.  c/o R facial weakness.  was in his usual state of health until 8AM.  noted water dripping from the R side of his mouth when taking this morning medications. In ED, initial HCT (+) old right sided CVA but no acute stroke or hemorrhage.  ASA given.  Neurology consulted.  Without residual symptoms, pt has an NIHSS 0 and tPA was not administered. Was given water with ASA to re-evaluate dysphagia screening.  Denies difficulty swallowing, no coughing and able to manage secretions.    2. A MR of the Head noted the below findings without any acute ICH or acute pathology. Findings were consistent with his previous CVA pathology as well as chronic brain changes therefore notable for a TIA sustained given the Pt's natural Hx and symptoms. Carotid artery findings noting stenosis right carotid stenosis were not the patients origin for his presentation of TIA as per neurology. It is recommened that the Pt should f/u as an outpatient with vascular surgery as well as neurology. Cardiology recommend outpatient TTE and a 14 day cardiac monitor as outpatient to rule out paroxysmal atrial fibrillation. Addition of ASA 81mg daily as well as maximal high intensity statin therapy of Atorvastatin 80mg po daily is recommended.    MR Head  IMPRESSION:   Extensive encephalomalacia and gliosis in the RIGHT basal ganglia and corona radiata with associated compensatory enlargement of the RIGHT lateral ventricle and Wallerian degeneration in the RIGHT cerebral peduncle, unchanged. Scattered white matter ischemia in the bifrontal and RIGHT parietal subcortical white matter.  BRENDA MALDONADO MD; Attending Radiologist  This document has been electronically signed. May 21 2021  9:45AM    Carotid study  Right carotid system:  high-grade (80-90%) stenosis of the RIGHT internal carotid artery 1.2 cm from the bifurcation. There is afocal intimal flap at the level of the narrowing which may reflect an ulcerated plaque. There is irregularity of the cervical internal carotid artery with tortuosity. This finding may be due to prior recanalized dissection.

## 2021-05-21 NOTE — DISCHARGE NOTE PROVIDER - PROVIDER TOKENS
PROVIDER:[TOKEN:[[id number]],FOLLOWUP:[2 weeks],ESTABLISHEDPATIENT:[T]],PROVIDER:[TOKEN:[[id number]],FOLLOWUP:[2 weeks],ESTABLISHEDPATIENT:[T]],PROVIDER:[TOKEN:[[id number]],FOLLOWUP:[1 week],ESTABLISHEDPATIENT:[T]]

## 2021-05-21 NOTE — SWALLOW BEDSIDE ASSESSMENT ADULT - SWALLOW EVAL: DIAGNOSIS
1) The pt is alert and interactive. He was able to verbalize during communicative probes. At these times, his motor speech abilities were functional and his verbalizations were linguistically intact/contextually appropriate. Pt was able to verbalize needs.  2) Pt exhibits functional oropharyngeal Swallowing abilities for age without overt Dysphagia or aspiration signs.

## 2021-05-21 NOTE — SWALLOW BEDSIDE ASSESSMENT ADULT - SWALLOW EVAL: CRITERIA FOR SKILLED INTERVENTION MET
DO NOT FEEL THAT ACUTE SPEECH PATHOLOGY FOLLOW UP WOULD CHANGE CLINICAL MANAGEMENT/OUTCOME WHILE IN ACUTE HOSPITAL SETTING. PT'S SPEECH-LANGUAGE ABILITIES AND OROPHARYNGEAL SWALLOWING ABILITIES WERE FELT TO BE FUNCTIONAL/AT SUSPECTED USUAL STATE. GIVEN ABOVE, WILL NOT ACTIVELY FOLLOW. RECONSULT PRN SHOULD STATUS CHANGE AND CONDITION WARRANT.

## 2021-05-21 NOTE — PHYSICAL THERAPY INITIAL EVALUATION ADULT - PERTINENT HX OF CURRENT PROBLEM, REHAB EVAL
c/o R facial weakness.  was in his usual state of health until 8AM.  noted water dripping from the R side of his mouth when taking this morning medications. CT of the head showed the old right sided stroke but now acute stroke or hemorrhage. MR head showed no acute changes.  CTA shows high grade right ICA stenosis

## 2021-05-21 NOTE — PHYSICAL THERAPY INITIAL EVALUATION ADULT - ACTIVE RANGE OF MOTION EXAMINATION, REHAB EVAL
except L DF ltd and w/ inversion, LUE ltd/Right UE Active ROM was WFL (within functional limits)/bilateral  lower extremity Active ROM was WFL (within functional limits)

## 2021-05-21 NOTE — DISCHARGE NOTE PROVIDER - NSDCFUADDAPPT_GEN_ALL_CORE_FT
1. Follow Up with Dr Portillo on 5/27/21 Thursday @ 1:30PM.  (Cardiology recommend outpatient TTE as well as a 14 day cardiac monitor as outpatient to rule out paroxysmal atrial fibrillation.)  2. Neurology f/u  3. Vascular Surgery f/u

## 2021-05-21 NOTE — SWALLOW BEDSIDE ASSESSMENT ADULT - COMMENTS
Pt admitted to  with onset of right facial droop/dribbling which is improving. Imaging notable for carotid stenosis on the right which does not correlate with admitting complaints. He does have an underlying h/o migraines, GERD, diverticulosis s/p colon resection NOS, past hip fracture, and prior CVA with residual chronic left sided weakness.

## 2021-05-21 NOTE — DISCHARGE NOTE PROVIDER - NSDCMRMEDTOKEN_GEN_ALL_CORE_FT
Allegra 24 Hour Allergy oral tablet: 1 tab(s) orally once a day  aspirin 81 mg oral tablet, chewable: 1 tab(s) orally once a day  atorvastatin 80 mg oral tablet: 1 tab(s) orally once a day MDD:80mg  clopidogrel 75 mg oral tablet: 1 tab(s) orally once a day  l-methylfolate 15 mg oral tablet: 1 tab(s) orally once a day  PARoxetine 20 mg oral tablet: 1 tab(s) orally once a day  Pepcid 20 mg oral tablet: 1 tab(s) orally once a day  potassium citrate 10 mEq oral tablet, extended release: 1 tab(s) orally once a day  Prolia 60 mg/mL subcutaneous solution: subcutaneous every 6 months  Toviaz 8 mg oral tablet, extended release: 1 tab(s) orally once a day  Vitamin D3 1000 intl units (25 mcg) oral tablet: 1 tab(s) orally once a day

## 2021-05-21 NOTE — PHYSICAL THERAPY INITIAL EVALUATION ADULT - GENERAL OBSERVATIONS, REHAB EVAL
"Chief Complaint   Patient presents with     Musculoskeletal Problem     Hands       Initial /70 (BP Location: Left arm, Patient Position: Chair, Cuff Size: Adult Large)  Pulse 84  Temp 98  F (36.7  C) (Oral)  Wt 249 lb (112.9 kg)  SpO2 95%  Breastfeeding? No  BMI 44.45 kg/m2 Estimated body mass index is 44.45 kg/(m^2) as calculated from the following:    Height as of 1/13/17: 5' 2.76\" (1.594 m).    Weight as of this encounter: 249 lb (112.9 kg).  Medication Reconciliation: complete   GREYSON/MA      " pt rec'd supine in bed on 3N, HM, L UE in flexor pattern-HP from past CVA

## 2021-05-21 NOTE — SWALLOW BEDSIDE ASSESSMENT ADULT - SLP GENERAL OBSERVATIONS
On encounter, pt with left sided weakness/LUE contracture which are chronic/pre-existing due to an old stroke.  The pt is alert and interactive. He was able to verbalize during communicative probes. At these times, his motor speech abilities were functional and his verbalizations were linguistically intact/contextually appropriate. Pt was able to verbalize needs.

## 2021-05-21 NOTE — CONSULT NOTE ADULT - SUBJECTIVE AND OBJECTIVE BOX
Patient is a 75y old  Male who presents with a chief complaint of   2021- Cardiology Consultation: 75 year old man with prior right hemisphere stroke  with chronic left sided weakness present with acute onset left sided headache and and right facial droop. The symptoms resolved spontaneously with in minutes to hours - the patient is unsure of the duration. Work up with CTA and MRI shows no acute stroke but there is high grade right ICA stenosis. . There is no history of HBP, AF, CHF, CAD. He is treated chronically with statin and Plavix. He recently completed 6 weeks of IV cefepime for recurrent Pseudomonas prostatis.  HPI:  CC:  Patient is a 75y old  Male who presents with a chief complaint of R facial weakness.    HPI:  75M.  CVA-L sided deficiet ().  ambulates w/ a cane at baseline.  c/o R facial weakness.  was in his usual state of health until 8AM.  noted water dripping from the R side of his mouth when taking this morning medications.    in ED, initial HCT (+) old right sided CVA but no acute stroke or hemorrhage.  ASA given.  Neurology consulted.  Without residual symptoms, pt has an NIHSS 0 and tPA was not administered.      was given water with ASA to re-evaluate dysphagia screening.  denies difficulty swallowing, no coughing and able to manage secretions.    PMHx:  CVA-L sided deficiet ();  GERD;  diverticulitis;  migraine HA;  hip fracture.    ROS:      all other review of systems are negative unless indicated above.    PAST MEDICAL & SURGICAL HISTORY:  CVA (Cerebral Infarction) (ICD9 434.91)    GERD (Gastroesophageal Reflux Disease) (ICD9 530.81)    Migraine Headache (ICD9 346.90)    Diverticulitis, Intestine Large (ICD9 562.11)    Hip Fracture (ICD9 820.8)    S/P Colon Resection (ICD9 V45.89)        Allergies    heparin (Unknown)    Intolerances        Home Medications:  Allegra 24 Hour Allergy oral tablet: 1 tab(s) orally once a day (20 May 2021 14:26)  atorvastatin 10 mg oral tablet: 1 tab(s) orally once a day (20 May 2021 14:26)  clopidogrel 75 mg oral tablet: 1 tab(s) orally once a day (20 May 2021 14:26)  l-methylfolate 15 mg oral tablet: 1 tab(s) orally once a day (20 May 2021 14:26)  PARoxetine 20 mg oral tablet: 1 tab(s) orally once a day (20 May 2021 14:26)  Pepcid 20 mg oral tablet: 1 tab(s) orally once a day (20 May 2021 14:26)  potassium citrate 10 mEq oral tablet, extended release: 1 tab(s) orally once a day (20 May 2021 14:26)  Prolia 60 mg/mL subcutaneous solution: subcutaneous every 6 months (20 May 2021 14:26)  Toviaz 8 mg oral tablet, extended release: 1 tab(s) orally once a day (20 May 2021 14:26)  Vitamin D3 1000 intl units (25 mcg) oral tablet: 1 tab(s) orally once a day (20 May 2021 14:26)      Social History:  ·	.  ·	retired.  ·	3 children.  ·	denied EtOH or tobacco.    FAMILY HISTORY:  ·	brother:  MM.  ·	father:  prostate CA.  ·	mother:  CAD.    Vital Signs Last 24 Hrs  T(C): 36.6 (20 May 2021 15:02), Max: 36.6 (20 May 2021 12:03)  T(F): 97.9 (20 May 2021 15:02), Max: 97.9 (20 May 2021 12:03)  HR: 89 (20 May 2021 15:30) (89 - 92)  BP: 134/87 (20 May 2021 15:30) (129/89 - 152/84)  BP(mean): 103 (20 May 2021 15:02) (103 - 103)  RR: 18 (20 May 2021 15:30) (16 - 18)  SpO2: 100% (20 May 2021 15:30) (94% - 100%)    Constitutional: NAD.   HEENT: PERRL, EOMI, MMM.  Neck: Soft and supple, No carotid bruit, No JVD  Respiratory: Breath sounds are clear bilaterally, No wheezing, rales or rhonchi  Cardiovascular: S1 and S2, regular rate and rhythm, no murmur, rub or gallop.  Gastrointestinal: Bowel Sounds present, soft, nontender, nondistended, no guarding, no rebound, no mass.  Extremities: No peripheral edema  Vascular: 2+ peripheral pulses  Neurological: A/O x 3, no focal deficits  Musculoskeletal: 5/5 strength b/l upper and lower extremities  Skin:  no visible rashes.                15.1   8.50  )-----------( 130      ( 20 May 2021 12:14 )             44.8       PT/INR - ( 20 May 2021 13:22 )   PT: 13.1 sec;   INR: 1.14 ratio         PTT - ( 20 May 2021 13:22 )  PTT:29.8 sec        140  |  108  |  22  ----------------------------<  85  4.1   |  25  |  0.71    Ca    8.8      20 May 2021 13:22    TPro  6.4  /  Alb  3.3  /  TBili  0.4  /  DBili  x   /  AST  38<H>  /  ALT  44  /  AlkPhos  71        LIVER FUNCTIONS - ( 20 May 2021 13:22 )  Alb: 3.3 g/dL / Pro: 6.4 gm/dL / ALK PHOS: 71 U/L / ALT: 44 U/L / AST: 38 U/L / GGT: x             CARDIAC MARKERS ( 20 May 2021 13:22 )  <0.015 ng/mL / x     / x     / x     / x        < from: Xray Chest 1 View- PORTABLE-Urgent (21 @ 13:03) >  IMPRESSION: Slight density left base laterally which appears chronic.    < end of copied text >  < from: CT Angio Head w/ IV Cont (21 @ 12:28) >  IMPRESSION:        1.   Right carotid system:  high-grade (80-90%) stenosis of the RIGHT internal carotid artery 1.2 cm from the bifurcation. There is afocal intimal flap at the level of the narrowing which may reflect an ulcerated plaque. There is irregularity of the cervical internal carotid artery with tortuosity. This finding may be due to prior recanalized dissection.        2.   Left carotid system:  No hemodynamically significant stenosis.        3.   Intracranial circulation:  RIGHT ICA, MCA and VIKRAM are smaller in caliber due to more proximal stenosis        4.   Brain:  large old infarction in the RIGHT basal ganglia extending into the RIGHT corona radiata and RIGHT centrum semiovale.        5. Perfusion: Tiny RIGHT parietal white matter core infarct. No hypoperfused tissue at risk is identified..    < end of copied text >  < from: CT Brain Stroke Protocol (21 @ 12:28) >  IMPRESSION:    1)  large old right MCA infarct with extensive gliosis and encephalomalacia in the right basal ganglia and right frontal lobe. No acute abnormality or hemorrhagic lesion noted. Similar findings noted on prior CT.  2)  follow-up MR imaging may be considered for further assessment.    < end of copied text >       (20 May 2021 15:54)      PAST MEDICAL & SURGICAL HISTORY:  CVA (Cerebral Infarction) (ICD9 434.91)    GERD (Gastroesophageal Reflux Disease) (ICD9 530.81)    Migraine Headache (ICD9 346.90)    Diverticulitis, Intestine Large (ICD9 562.11)    Hip Fracture (ICD9 820.8)    S/P Colon Resection (ICD9 V45.89)          MEDICATIONS  (STANDING):  aspirin  chewable 81 milliGRAM(s) Oral daily  atorvastatin 80 milliGRAM(s) Oral at bedtime  cholecalciferol 1000 Unit(s) Oral daily  clopidogrel Tablet 75 milliGRAM(s) Oral daily  loratadine 10 milliGRAM(s) Oral daily  pantoprazole    Tablet 40 milliGRAM(s) Oral before breakfast  PARoxetine 20 milliGRAM(s) Oral daily    MEDICATIONS  (PRN):      FAMILY HISTORY:      SOCIAL HISTORY:  Tobacco-           Alcohol-              Illicit drugs-              Occupation-              Marital  status-  REVIEW OF SYSTEM:  Pertinent items are noted in HPI.    Vital Signs Last 24 Hrs  T(C): 36.9 (21 May 2021 05:15), Max: 36.9 (20 May 2021 22:04)  T(F): 98.4 (21 May 2021 05:15), Max: 98.4 (20 May 2021 22:04)  HR: 83 (21 May 2021 05:15) (83 - 92)  BP: 128/89 (21 May 2021 05:15) (128/86 - 152/84)  BP(mean): 108 (20 May 2021 18:58) (103 - 108)  RR: 17 (21 May 2021 05:15) (16 - 18)  SpO2: 98% (21 May 2021 05:15) (94% - 100%)    I&O's Summary    20 May 2021 07:01  -  21 May 2021 07:00  --------------------------------------------------------  IN: 0 mL / OUT: 400 mL / NET: -400 mL      PHYSICAL EXAM  General Appearance: comfortable  HEENT: PERRL, conjunctiva clear, EOM's intact, non injected pharynx, no exudate, TM   normal  Neck: Supple, , no adenopathy, thyroid: not enlarged, no carotid bruit or JVD  Back: Symmetric, no  tenderness,no soft tissue tenderness  Lungs: Clear to auscultation bilaterally,no adventitious breath sounds, normal   expiratory phase  Heart: Regular rate and rhythm, S1, S2 normal, no murmur, rub or gallop  Abdomen: Soft, non-tender, bowel sounds active , no hepatosplenomegaly  Extremities: no cyanosis or edema, no joint swelling  Skin: Skin color, texture normal, no rashes   Neurologic: Alert and oriented X3 , spastic weakness left arm, 4/5 strength  left leg      INTERPRETATION OF TELEMETRY: RSR    ECG: sinus rhythm  84 BPM, normal tracing.        LABS:                          15.1   8.50  )-----------( 130      ( 20 May 2021 12:14 )             44.8     05-21    140  |  108  |  17  ----------------------------<  90  4.1   |  25  |  0.60    Ca    9.2      21 May 2021 07:17    TPro  6.4  /  Alb  3.3  /  TBili  0.4  /  DBili  x   /  AST  38<H>  /  ALT  44  /  AlkPhos  71  05-20    CARDIAC MARKERS ( 20 May 2021 13:22 )  <0.015 ng/mL / x     / x     / x     / x              PT/INR - ( 20 May 2021 13:22 )   PT: 13.1 sec;   INR: 1.14 ratio         PTT - ( 20 May 2021 13:22 )  PTT:29.8 sec  Urinalysis Basic - ( 20 May 2021 14:08 )    Color: Yellow / Appearance: Clear / S.010 / pH: x  Gluc: x / Ketone: Negative  / Bili: Negative / Urobili: Negative mg/dL   Blood: x / Protein: 15 mg/dL / Nitrite: Positive   Leuk Esterase: Trace / RBC: 0-2 /HPF / WBC 11-25   Sq Epi: x / Non Sq Epi: x / Bacteria: x            RADIOLOGY & ADDITIONAL STUDIES:    IMPRESSION:  1. Transient right facial weakness consistent with TIA vs migraine syndrome.  2. 80-90% right ICA stenosis. Prior right MCA infarct CVA .    PLAN:  Agree with increased dose atorvastatin 80 mg qd and adding ASA 81 mg qd. Continue Plavix. Suggest TTE either in hospital or as outpatient. Will arrange 14 day cardiac monitor as outpatient through my office to rule out paroxysmal atrial fibrillation. Suggest outpatient  followup with vascular surgery and neurology.

## 2021-05-28 DIAGNOSIS — K21.9 GASTRO-ESOPHAGEAL REFLUX DISEASE WITHOUT ESOPHAGITIS: ICD-10-CM

## 2021-05-28 DIAGNOSIS — G43.909 MIGRAINE, UNSPECIFIED, NOT INTRACTABLE, WITHOUT STATUS MIGRAINOSUS: ICD-10-CM

## 2021-05-28 DIAGNOSIS — G45.9 TRANSIENT CEREBRAL ISCHEMIC ATTACK, UNSPECIFIED: ICD-10-CM

## 2021-06-02 ENCOUNTER — APPOINTMENT (OUTPATIENT)
Dept: NEUROLOGY | Facility: CLINIC | Age: 76
End: 2021-06-02
Payer: MEDICARE

## 2021-06-02 VITALS
BODY MASS INDEX: 30.78 KG/M2 | HEIGHT: 70 IN | HEART RATE: 90 BPM | DIASTOLIC BLOOD PRESSURE: 60 MMHG | OXYGEN SATURATION: 95 % | WEIGHT: 215 LBS | TEMPERATURE: 97.7 F | SYSTOLIC BLOOD PRESSURE: 106 MMHG

## 2021-06-02 DIAGNOSIS — I65.21 OCCLUSION AND STENOSIS OF RIGHT CAROTID ARTERY: ICD-10-CM

## 2021-06-02 DIAGNOSIS — I77.71 DISSECTION OF CAROTID ARTERY: ICD-10-CM

## 2021-06-02 DIAGNOSIS — Z78.9 OTHER SPECIFIED HEALTH STATUS: ICD-10-CM

## 2021-06-02 DIAGNOSIS — I69.30 UNSPECIFIED SEQUELAE OF CEREBRAL INFARCTION: ICD-10-CM

## 2021-06-02 PROCEDURE — 99205 OFFICE O/P NEW HI 60 MIN: CPT

## 2021-06-02 RX ORDER — ASPIRIN 81 MG/1
81 TABLET, CHEWABLE ORAL
Refills: 0 | Status: ACTIVE | COMMUNITY

## 2021-06-02 RX ORDER — ATORVASTATIN CALCIUM 80 MG/1
80 TABLET, FILM COATED ORAL
Refills: 0 | Status: ACTIVE | COMMUNITY

## 2021-06-02 NOTE — HISTORY OF PRESENT ILLNESS
[FreeTextEntry1] : Mr. 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 CT Head negative for acute stroke, CTA H/N: Right carotid system: high-grade (80-90%) stenosis of the RIGHT internal carotid artery 1.2 cm from the bifurcation. I personally reviewed the CTA and it shows an irregular, tortuous and multifocally stenotic right ICA distal to the bulb, consistent with recanalization of a previously occluded dissection. His left ICA is fusiformly dilated in the cervical segment, without stenosis. MRI Head: chronic  RIGHT basal ganglia and corona radiata infarct, no acute infarct. All neuroimaging personally reviewed by me. Patient has residual left hemiparesis and ambulates with a cane. He was on Plavix prior to the TIA and now also on ASA and a higher dose of Lipitor. He comes in today to discuss the most recent CTA findings. He denies any interval TIA/Stroke symptoms.

## 2021-06-02 NOTE — DISCUSSION/SUMMARY
[FreeTextEntry1] : Mr. Lyons is a 75 year old man now 13 years s/p right MCA stroke due to right carotid dissection and 2 weeks s/p TIA and hospital admission at [facility]. I saw him last in May 2012. Based on my review of the imaging, there is no severe right ICA stenosis; rather the chronic occlusion from a dissection has recanalized in the past 10 years and there is moderate to severe multifocal stenosis and irregularity from the previous dissection, not from atherosclerotic disease. Therefore, there is no indication for any interventional treatment and as discussed, CEA and TEODORO are not indicated for this disease process. He should continue the ASA and Plavix for another 2 weeks and then Plavix monotherapy; will check P2Y12 for therapeutic level. He will follow up as needed. All of their questions and concerns were addressed.

## 2021-06-02 NOTE — REVIEW OF SYSTEMS
[Arm Weakness] : arm weakness [Hand Weakness] :  hand weakness [Leg Weakness] : leg weakness [Difficulty Walking] : difficulty walking [Fever] : no fever [Chills] : no chills [Feeling Poorly] : not feeling poorly [Feeling Tired] : not feeling tired [Suicidal] : not suicidal [Anxiety] : no anxiety [Depression] : no depression [Confused or Disoriented] : no confusion [Memory Lapses or Loss] : no memory loss [Decr. Concentrating Ability] : no decrease in concentrating ability [Difficulty with Language] : no ~M difficulty with language [Changed Thought Patterns] : no change in thought patterns [Repeating Questions] : no repeated questioning about recent events [Facial Weakness] : no facial weakness [Numbness] : no numbness [Tingling] : no tingling [Seizures] : no convulsions [Dizziness] : no dizziness [Fainting] : no fainting [Lightheadedness] : no lightheadedness [Vertigo] : no vertigo [Tension Headache] : no tension-type headache [Inability to Walk] : able to walk [Eyesight Problems] : no eyesight problems [Loss Of Hearing] : no hearing loss [Chest Pain] : no chest pain [Palpitations] : no palpitations [Shortness Of Breath] : no shortness of breath [Wheezing] : no wheezing [Vomiting] : no vomiting [Constipation] : no constipation [Incontinence] : no incontinence [Joint Pain] : no joint pain [Skin Lesions] : no skin lesions [Skin Wound] : no skin wound [Easy Bleeding] : no tendency for easy bleeding [Easy Bruising] : no tendency for easy bruising

## 2021-06-02 NOTE — PHYSICAL EXAM
[General Appearance - Alert] : alert [General Appearance - Well Nourished] : well nourished [General Appearance - Well Developed] : well developed [Oriented To Time, Place, And Person] : oriented to person, place, and time [Affect] : the affect was normal [Mood] : the mood was normal [Person] : oriented to person [Time] : oriented to time [Place] : oriented to place [Concentration Intact] : normal concentrating ability [Visual Intact] : visual attention was ~T not ~L decreased [Naming Objects] : no difficulty naming common objects [Repeating Phrases] : no difficulty repeating a phrase [Writing A Sentence] : no difficulty writing a sentence [Fluency] : fluency intact [Comprehension] : comprehension intact [Reading] : reading intact [Past History] : adequate knowledge of personal past history [Cranial Nerves Optic (II)] : visual acuity intact bilaterally,  visual fields full to confrontation, pupils equal round and reactive to light [Cranial Nerves Oculomotor (III)] : extraocular motion intact [Cranial Nerves Facial (VII)] : face symmetrical [Cranial Nerves Trigeminal (V)] : facial sensation intact symmetrically [Cranial Nerves Vestibulocochlear (VIII)] : hearing was intact bilaterally [Cranial Nerves Glossopharyngeal (IX)] : tongue and palate midline [Cranial Nerves Accessory (XI - Cranial And Spinal)] : head turning and shoulder shrug symmetric [Cranial Nerves Hypoglossal (XII)] : there was no tongue deviation with protrusion [Motor Tone] : muscle tone was normal in all four extremities [Motor Strength] : muscle strength was normal in all four extremities [No Muscle Atrophy] : normal bulk in all four extremities [Motor Handedness Right-Handed] : the patient is right hand dominant [Hemiplegia Of Left Side] : hemiplegia was present on the left [Motor Strength Upper Extremities Left] : there was weakness of the left upper extremity [Motor Strength Lower Extremities Left] : there was weakness of the left lower extremity [Sensation Tactile Decrease] : light touch was intact [Full Visual Field] : full visual field [Hearing Threshold Finger Rub Not [place]] : hearing was normal [Neck Appearance] : the appearance of the neck was normal [] : no respiratory distress [Heart Rate And Rhythm] : heart rate was normal and rhythm regular [Edema] : there was no peripheral edema [Abdomen Soft] : soft [Musculoskeletal - Swelling] : no joint swelling seen [Skin Color & Pigmentation] : normal skin color and pigmentation [Skin Turgor] : normal skin turgor

## 2021-06-08 ENCOUNTER — LABORATORY RESULT (OUTPATIENT)
Age: 76
End: 2021-06-08

## 2021-06-09 LAB — PA ADP PRP-ACNC: 128 PRU

## 2021-07-07 ENCOUNTER — OUTPATIENT (OUTPATIENT)
Dept: OUTPATIENT SERVICES | Facility: HOSPITAL | Age: 76
LOS: 1 days | End: 2021-07-07
Payer: MEDICARE

## 2021-07-07 ENCOUNTER — APPOINTMENT (OUTPATIENT)
Dept: UROLOGY | Facility: CLINIC | Age: 76
End: 2021-07-07
Payer: MEDICARE

## 2021-07-07 VITALS
HEIGHT: 70 IN | BODY MASS INDEX: 30.06 KG/M2 | SYSTOLIC BLOOD PRESSURE: 114 MMHG | TEMPERATURE: 97.6 F | WEIGHT: 210 LBS | HEART RATE: 94 BPM | DIASTOLIC BLOOD PRESSURE: 78 MMHG | RESPIRATION RATE: 18 BRPM

## 2021-07-07 PROCEDURE — 51741 ELECTRO-UROFLOWMETRY FIRST: CPT

## 2021-07-07 PROCEDURE — 51798 US URINE CAPACITY MEASURE: CPT

## 2021-07-07 PROCEDURE — 99204 OFFICE O/P NEW MOD 45 MIN: CPT

## 2021-07-09 DIAGNOSIS — R30.0 DYSURIA: ICD-10-CM

## 2021-07-10 LAB
APPEARANCE: ABNORMAL
BACTERIA: ABNORMAL
BILIRUBIN URINE: NEGATIVE
BLOOD URINE: NEGATIVE
COLOR: YELLOW
GLUCOSE QUALITATIVE U: NEGATIVE
HYALINE CASTS: 0 /LPF
KETONES URINE: NEGATIVE
LEUKOCYTE ESTERASE URINE: ABNORMAL
MICROSCOPIC-UA: NORMAL
NITRITE URINE: POSITIVE
PH URINE: 6.5
PROTEIN URINE: ABNORMAL
RED BLOOD CELLS URINE: 4 /HPF
SPECIFIC GRAVITY URINE: 1.02
SQUAMOUS EPITHELIAL CELLS: 0 /HPF
UROBILINOGEN URINE: NORMAL
WHITE BLOOD CELLS URINE: 169 /HPF

## 2021-07-11 NOTE — HISTORY OF PRESENT ILLNESS
[FreeTextEntry1] : edwin for second opinion for recurrent/persistent UTI and viding issues.\par \par  h/o LUts due to BPH. Underwent Rezum 2/20. this was complicated by a UTI and treated with amoxicillin. he then developed epididymitis 3/20 confirmed on ULS treated with nitrofurantoin. he had continued issues after a urine culture grew out enterococcus ad was ID specialist who switched him to Augmentin followed by ciprofloxacin through 9/20.\par he did OK until UTI symptoms returned 2/21 - treated with nitrofurantoin followed by Ciprofloxacin. Saw ID MD who noted a MDR pseudomonas and was treated with 1 month IV Cefepime. He now has asymptomatic bacteria - or feels as such, he reports being advised to have his epididymis removed as may be source of the bacteria  though he is not sure which one and denies swelling or pain in either one. \par \par he notes a variable FOS with hesitancy but no straining he has frequency urgency and urge incontinence; if take toviaz it is better; has nocturia 304 times with occasional wetness. he stopped flomax due to dizziness.\par voided 60cc with peak FR 5cc - flat nomogram and .

## 2021-07-11 NOTE — ASSESSMENT
[FreeTextEntry1] : see no indication for scrotal surgery.\par e is retaining and has significant LUTs; as such would be best served with an outlet procedure: Button TURP.

## 2021-07-11 NOTE — PHYSICAL EXAM
[General Appearance - Well Developed] : well developed [General Appearance - Well Nourished] : well nourished [Edema] : no peripheral edema [Exaggerated Use Of Accessory Muscles For Inspiration] : no accessory muscle use [Abdomen Soft] : soft [Abdomen Tenderness] : non-tender [Abdomen Mass (___ Cm)] : no abdominal mass palpated [Abdomen Hernia] : no hernia was discovered [No Lesions] : no lesions [Circumcised] : the penis was circumcised [Normal] : normal [Scrotum Hydrocele On The Right] : no hydrocele [Scrotum Hydrocele On The Left] : no hydrocele [Testes] : normal [Epididymis Tender Swelling Right] : was not swollen [Epididymis] : was non-tender [Epididymis Tender Swelling Left] : was not swollen [Vas Deferens / Spermatic Cord] : was normal [Normal Station and Gait] : the gait and station were normal for the patient's age [] : no rash [FreeTextEntry1] : left stephanie-paresis  [Oriented To Time, Place, And Person] : oriented to person, place, and time

## 2021-07-11 NOTE — REVIEW OF SYSTEMS
[Feeling Tired] : feeling tired [Dry Eyes] : dryness of the eyes [Earache] : earache [Shortness Of Breath] : shortness of breath [Genital bacterial infection] : genital bacterial infection [Urine Infection (bladder/kidney)] : bladder/kidney infection [History of kidney stones] : history of kidney stones [Urine retention] : urine retention [Wake up at night to urinate  How many times?  ___] : wakes up to urinate [unfilled] times during the night [Strong urge to urinate] : strong urge to urinate [Strain or push to urinate] : strain or push to urinate [Slow urine stream] : slow urine stream [Joint Pain] : joint pain [Itching] : itching [Limb Weakness] : limb weakness [Difficulty Walking] : difficulty walking [Muscle Weakness] : muscle weakness [Feelings Of Weakness] : feelings of weakness [Negative] : Heme/Lymph [FreeTextEntry3] : dribbling of urine

## 2021-07-13 ENCOUNTER — NON-APPOINTMENT (OUTPATIENT)
Age: 76
End: 2021-07-13

## 2021-07-13 LAB — BACTERIA UR CULT: ABNORMAL

## 2021-07-13 RX ORDER — LEVOFLOXACIN 500 MG/1
500 TABLET, FILM COATED ORAL DAILY
Qty: 10 | Refills: 0 | Status: ACTIVE | COMMUNITY
Start: 2021-07-13 | End: 1900-01-01

## 2021-07-20 ENCOUNTER — OUTPATIENT (OUTPATIENT)
Dept: OUTPATIENT SERVICES | Facility: HOSPITAL | Age: 76
LOS: 1 days | End: 2021-07-20

## 2021-07-20 VITALS
HEIGHT: 70 IN | OXYGEN SATURATION: 97 % | DIASTOLIC BLOOD PRESSURE: 80 MMHG | SYSTOLIC BLOOD PRESSURE: 115 MMHG | TEMPERATURE: 97 F | HEART RATE: 88 BPM | RESPIRATION RATE: 13 BRPM | WEIGHT: 210.98 LBS

## 2021-07-20 DIAGNOSIS — Z98.890 OTHER SPECIFIED POSTPROCEDURAL STATES: Chronic | ICD-10-CM

## 2021-07-20 DIAGNOSIS — R33.9 RETENTION OF URINE, UNSPECIFIED: ICD-10-CM

## 2021-07-20 DIAGNOSIS — Z01.818 ENCOUNTER FOR OTHER PREPROCEDURAL EXAMINATION: ICD-10-CM

## 2021-07-20 DIAGNOSIS — Z90.49 ACQUIRED ABSENCE OF OTHER SPECIFIED PARTS OF DIGESTIVE TRACT: Chronic | ICD-10-CM

## 2021-07-20 DIAGNOSIS — N39.0 URINARY TRACT INFECTION, SITE NOT SPECIFIED: ICD-10-CM

## 2021-07-20 DIAGNOSIS — N40.0 BENIGN PROSTATIC HYPERPLASIA WITHOUT LOWER URINARY TRACT SYMPTOMS: ICD-10-CM

## 2021-07-20 LAB
ALBUMIN SERPL ELPH-MCNC: 3.9 G/DL — SIGNIFICANT CHANGE UP (ref 3.3–5)
ALP SERPL-CCNC: 74 U/L — SIGNIFICANT CHANGE UP (ref 40–120)
ALT FLD-CCNC: 48 U/L — HIGH (ref 4–41)
ANION GAP SERPL CALC-SCNC: 11 MMOL/L — SIGNIFICANT CHANGE UP (ref 7–14)
AST SERPL-CCNC: 46 U/L — HIGH (ref 4–40)
BILIRUB SERPL-MCNC: 0.5 MG/DL — SIGNIFICANT CHANGE UP (ref 0.2–1.2)
BUN SERPL-MCNC: 18 MG/DL — SIGNIFICANT CHANGE UP (ref 7–23)
CALCIUM SERPL-MCNC: 9.1 MG/DL — SIGNIFICANT CHANGE UP (ref 8.4–10.5)
CHLORIDE SERPL-SCNC: 104 MMOL/L — SIGNIFICANT CHANGE UP (ref 98–107)
CO2 SERPL-SCNC: 23 MMOL/L — SIGNIFICANT CHANGE UP (ref 22–31)
CREAT SERPL-MCNC: 0.82 MG/DL — SIGNIFICANT CHANGE UP (ref 0.5–1.3)
GLUCOSE SERPL-MCNC: 100 MG/DL — HIGH (ref 70–99)
HCT VFR BLD CALC: 43.8 % — SIGNIFICANT CHANGE UP (ref 39–50)
HGB BLD-MCNC: 14.2 G/DL — SIGNIFICANT CHANGE UP (ref 13–17)
MCHC RBC-ENTMCNC: 30.2 PG — SIGNIFICANT CHANGE UP (ref 27–34)
MCHC RBC-ENTMCNC: 32.4 GM/DL — SIGNIFICANT CHANGE UP (ref 32–36)
MCV RBC AUTO: 93.2 FL — SIGNIFICANT CHANGE UP (ref 80–100)
NRBC # BLD: 0 /100 WBCS — SIGNIFICANT CHANGE UP
NRBC # FLD: 0 K/UL — SIGNIFICANT CHANGE UP
PLATELET # BLD AUTO: 135 K/UL — LOW (ref 150–400)
POTASSIUM SERPL-MCNC: 4 MMOL/L — SIGNIFICANT CHANGE UP (ref 3.5–5.3)
POTASSIUM SERPL-SCNC: 4 MMOL/L — SIGNIFICANT CHANGE UP (ref 3.5–5.3)
PROT SERPL-MCNC: 6.5 G/DL — SIGNIFICANT CHANGE UP (ref 6–8.3)
RBC # BLD: 4.7 M/UL — SIGNIFICANT CHANGE UP (ref 4.2–5.8)
RBC # FLD: 12.8 % — SIGNIFICANT CHANGE UP (ref 10.3–14.5)
SODIUM SERPL-SCNC: 138 MMOL/L — SIGNIFICANT CHANGE UP (ref 135–145)
WBC # BLD: 4.89 K/UL — SIGNIFICANT CHANGE UP (ref 3.8–10.5)
WBC # FLD AUTO: 4.89 K/UL — SIGNIFICANT CHANGE UP (ref 3.8–10.5)

## 2021-07-20 RX ORDER — POTASSIUM CITRATE MONOHYDRATE 100 %
1 POWDER (GRAM) MISCELLANEOUS
Qty: 0 | Refills: 0 | DISCHARGE

## 2021-07-20 RX ORDER — FOLIC ACID 7.5 MG
1 TABLET ORAL
Qty: 0 | Refills: 0 | DISCHARGE

## 2021-07-20 RX ORDER — CLOPIDOGREL BISULFATE 75 MG/1
1 TABLET, FILM COATED ORAL
Qty: 0 | Refills: 0 | DISCHARGE

## 2021-07-20 RX ORDER — FAMOTIDINE 10 MG/ML
1 INJECTION INTRAVENOUS
Qty: 0 | Refills: 0 | DISCHARGE

## 2021-07-20 RX ORDER — FESOTERODINE FUMARATE 8 MG/1
1 TABLET, FILM COATED, EXTENDED RELEASE ORAL
Qty: 0 | Refills: 0 | DISCHARGE

## 2021-07-20 RX ORDER — CHOLECALCIFEROL (VITAMIN D3) 125 MCG
1 CAPSULE ORAL
Qty: 0 | Refills: 0 | DISCHARGE

## 2021-07-20 RX ORDER — FEXOFENADINE HCL 30 MG
1 TABLET ORAL
Qty: 0 | Refills: 0 | DISCHARGE

## 2021-07-20 RX ORDER — SODIUM CHLORIDE 9 MG/ML
1000 INJECTION, SOLUTION INTRAVENOUS
Refills: 0 | Status: DISCONTINUED | OUTPATIENT
Start: 2021-08-02 | End: 2021-08-16

## 2021-07-20 NOTE — H&P PST ADULT - ASSESSMENT
Pt. is a 76 yo male accompanied by his wife.  Pt. has enlarged prostate.      Informed pt. that urine could not be collected today due to this being his last dose of Levaquin for a UTI.  Pt's. wife stated that she was told by the Surgeon's Nurse that PST would collect a urine culture.  Called Urology Nurse, Mae in triage and had her confirm that urine has to be collected 3 days after completion of antibiotics and that pt. can come in as a walk in for the urine.  Informed the pt..

## 2021-07-20 NOTE — H&P PST ADULT - NSANTHOSAYNRD_GEN_A_CORE
No. MATIAS screening performed.  STOP BANG Legend: 0-2 = LOW Risk; 3-4 = INTERMEDIATE Risk; 5-8 = HIGH Risk

## 2021-07-20 NOTE — H&P PST ADULT - NSICDXPASTSURGICALHX_GEN_ALL_CORE_FT
PAST SURGICAL HISTORY:  History of cholecystectomy 2016    History of prostate surgery "rezum procedure"-2020    History of repair of left hip joint 2009    S/P Colon Resection (ICD9 V45.89) 2003

## 2021-07-20 NOTE — H&P PST ADULT - NSICDXPASTMEDICALHX_GEN_ALL_CORE_FT
PAST MEDICAL HISTORY:  Anxiety and depression     CVA (Cerebral Infarction) (ICD9 434.91) 2008    Diverticulitis, Intestine Large (ICD9 562.11)     Fatty liver     GERD (Gastroesophageal Reflux Disease) (ICD9 530.81)     [place]'s disease     Hip Fracture (ICD9 820.8) left    Migraine Headache (ICD9 346.90)      PAST MEDICAL HISTORY:  Anxiety and depression     CVA (Cerebral Infarction) (ICD9 434.91) 2008    Diverticulitis, Intestine Large (ICD9 562.11)     Fatty liver     GERD (Gastroesophageal Reflux Disease) (ICD9 530.81)     Delta's disease     Hip Fracture (ICD9 820.8) left    Migraine Headache (ICD9 346.90)     Obese

## 2021-07-20 NOTE — H&P PST ADULT - NSICDXPROBLEM_GEN_ALL_CORE_FT
PROBLEM DIAGNOSES  Problem: Enlarged prostate  Assessment and Plan: Pt. is scheduled for a button TURP 8/2/21.  Pt. verbalized understanding of instructions.  Pt. is currently on Levaquin for a UTI.  OR booking notified of MATIAS precautions.  Pt. is scheduled for medical clearance.  Pt. states he was instructed to stop ASA 6/18/21.  Pt. had a CVA several years ago.  Attempted to speak with pt's. PMD to confirm if/when pt. can stop Plavix.  The PMD is out of the office.  Spoke with PMD's NP, Oxana who states she will call PST tomorrow with Plavix information.  Will contact pt. once Plavix instruction is confimed.

## 2021-07-24 PROBLEM — K76.0 FATTY (CHANGE OF) LIVER, NOT ELSEWHERE CLASSIFIED: Chronic | Status: ACTIVE | Noted: 2021-07-20

## 2021-07-24 PROBLEM — E66.9 OBESITY, UNSPECIFIED: Chronic | Status: ACTIVE | Noted: 2021-07-20

## 2021-07-24 PROBLEM — F41.9 ANXIETY DISORDER, UNSPECIFIED: Chronic | Status: ACTIVE | Noted: 2021-07-20

## 2021-07-24 PROBLEM — L11.1 TRANSIENT ACANTHOLYTIC DERMATOSIS [GROVER]: Chronic | Status: ACTIVE | Noted: 2021-07-20

## 2021-07-29 ENCOUNTER — NON-APPOINTMENT (OUTPATIENT)
Age: 76
End: 2021-07-29

## 2021-07-29 RX ORDER — DOXYCYCLINE HYCLATE 100 MG/1
100 CAPSULE ORAL DAILY
Qty: 10 | Refills: 0 | Status: ACTIVE | COMMUNITY
Start: 2021-07-29 | End: 1900-01-01

## 2021-07-30 ENCOUNTER — APPOINTMENT (OUTPATIENT)
Dept: DISASTER EMERGENCY | Facility: CLINIC | Age: 76
End: 2021-07-30

## 2021-07-30 NOTE — ASU PATIENT PROFILE, ADULT - PSH
History of cholecystectomy  2016  History of prostate surgery  "rezum procedure"-2020  History of repair of left hip joint  2009  S/P Colon Resection (ICD9 V45.89)  2003

## 2021-07-30 NOTE — ASU PATIENT PROFILE, ADULT - MEDICATION ADMINISTRATION INFO, PROFILE
From: Marely Connelly  To: Yamileth Randle  Sent: 3/4/2021 11:37 PM CST  Subject: Other    Can you send I three month prescriptions for Niaspan, triamterene and losartan. I have tried doing this several times calling in the request and they don't seem to understand. Thank you BP still under 120 systolic the majority of the time and no flushing with Niaspan. Received my first COVID vaccine last Friday.   Thank you   Scot  
no concerns

## 2021-07-30 NOTE — ASU PATIENT PROFILE, ADULT - PMH
Anxiety and depression    CVA (Cerebral Infarction) (ICD9 434.91)  2008  Diverticulitis, Intestine Large (ICD9 562.11)    Fatty liver    GERD (Gastroesophageal Reflux Disease) (ICD9 530.81)    Iker's disease    Hip Fracture (ICD9 820.8)  left  Migraine Headache (ICD9 346.90)    Obese

## 2021-07-30 NOTE — ASU PATIENT PROFILE, ADULT - AS SC BRADEN MOISTURE
[Absent] : Adnexa(ae): Absent [Normal] : Bimanual Exam: Normal [de-identified] : Patient was interviewed and examined with chaperone present. Name of Chaperone: Harini Donahue (4) rarely moist

## 2021-07-31 LAB
BACTERIA UR CULT: ABNORMAL
SARS-COV-2 N GENE NPH QL NAA+PROBE: NOT DETECTED

## 2021-08-01 ENCOUNTER — TRANSCRIPTION ENCOUNTER (OUTPATIENT)
Age: 76
End: 2021-08-01

## 2021-08-02 ENCOUNTER — OUTPATIENT (OUTPATIENT)
Dept: OUTPATIENT SERVICES | Facility: HOSPITAL | Age: 76
LOS: 1 days | Discharge: HOME | End: 2021-08-02
Payer: MEDICARE

## 2021-08-02 ENCOUNTER — APPOINTMENT (OUTPATIENT)
Dept: UROLOGY | Facility: HOSPITAL | Age: 76
End: 2021-08-02

## 2021-08-02 ENCOUNTER — APPOINTMENT (OUTPATIENT)
Dept: UROLOGY | Facility: CLINIC | Age: 76
End: 2021-08-02
Payer: MEDICARE

## 2021-08-02 ENCOUNTER — NON-APPOINTMENT (OUTPATIENT)
Age: 76
End: 2021-08-02

## 2021-08-02 VITALS
RESPIRATION RATE: 14 BRPM | HEART RATE: 75 BPM | OXYGEN SATURATION: 95 % | TEMPERATURE: 98 F | WEIGHT: 210.98 LBS | DIASTOLIC BLOOD PRESSURE: 79 MMHG | HEIGHT: 70 IN | SYSTOLIC BLOOD PRESSURE: 130 MMHG

## 2021-08-02 VITALS
RESPIRATION RATE: 17 BRPM | OXYGEN SATURATION: 100 % | SYSTOLIC BLOOD PRESSURE: 148 MMHG | HEART RATE: 90 BPM | DIASTOLIC BLOOD PRESSURE: 72 MMHG | TEMPERATURE: 97 F

## 2021-08-02 DIAGNOSIS — Z98.890 OTHER SPECIFIED POSTPROCEDURAL STATES: Chronic | ICD-10-CM

## 2021-08-02 DIAGNOSIS — R33.9 RETENTION OF URINE, UNSPECIFIED: ICD-10-CM

## 2021-08-02 DIAGNOSIS — R31.0 GROSS HEMATURIA: ICD-10-CM

## 2021-08-02 DIAGNOSIS — Z90.49 ACQUIRED ABSENCE OF OTHER SPECIFIED PARTS OF DIGESTIVE TRACT: Chronic | ICD-10-CM

## 2021-08-02 PROCEDURE — 52601 PROSTATECTOMY (TURP): CPT

## 2021-08-02 PROCEDURE — 99024 POSTOP FOLLOW-UP VISIT: CPT

## 2021-08-02 RX ORDER — FESOTERODINE FUMARATE 8 MG/1
1 TABLET, FILM COATED, EXTENDED RELEASE ORAL
Qty: 0 | Refills: 0 | DISCHARGE

## 2021-08-02 RX ORDER — POTASSIUM CITRATE MONOHYDRATE 100 %
1 POWDER (GRAM) MISCELLANEOUS
Qty: 0 | Refills: 0 | DISCHARGE

## 2021-08-02 RX ORDER — FAMOTIDINE 10 MG/ML
1 INJECTION INTRAVENOUS
Qty: 0 | Refills: 0 | DISCHARGE

## 2021-08-02 RX ORDER — FENTANYL CITRATE 50 UG/ML
25 INJECTION INTRAVENOUS
Refills: 0 | Status: DISCONTINUED | OUTPATIENT
Start: 2021-08-02 | End: 2021-08-02

## 2021-08-02 RX ORDER — ATORVASTATIN CALCIUM 80 MG/1
1 TABLET, FILM COATED ORAL
Qty: 0 | Refills: 0 | DISCHARGE

## 2021-08-02 RX ORDER — FOLIC ACID 7.5 MG
1 TABLET ORAL
Qty: 0 | Refills: 0 | DISCHARGE

## 2021-08-02 RX ORDER — CLOPIDOGREL BISULFATE 75 MG/1
1 TABLET, FILM COATED ORAL
Qty: 0 | Refills: 0 | DISCHARGE

## 2021-08-02 RX ORDER — MULTIVIT-MIN/FERROUS GLUCONATE 9 MG/15 ML
1 LIQUID (ML) ORAL
Qty: 0 | Refills: 0 | DISCHARGE

## 2021-08-02 RX ORDER — DENOSUMAB 60 MG/ML
0 INJECTION SUBCUTANEOUS
Qty: 0 | Refills: 0 | DISCHARGE

## 2021-08-02 RX ORDER — CIPROFLOXACIN LACTATE 400MG/40ML
1 VIAL (ML) INTRAVENOUS
Qty: 0 | Refills: 0 | DISCHARGE

## 2021-08-02 RX ORDER — FENTANYL CITRATE 50 UG/ML
50 INJECTION INTRAVENOUS
Refills: 0 | Status: DISCONTINUED | OUTPATIENT
Start: 2021-08-02 | End: 2021-08-02

## 2021-08-02 RX ORDER — ONDANSETRON 8 MG/1
4 TABLET, FILM COATED ORAL ONCE
Refills: 0 | Status: DISCONTINUED | OUTPATIENT
Start: 2021-08-02 | End: 2021-08-16

## 2021-08-02 RX ORDER — FEXOFENADINE HCL 30 MG
1 TABLET ORAL
Qty: 0 | Refills: 0 | DISCHARGE

## 2021-08-02 RX ADMIN — FENTANYL CITRATE 50 MICROGRAM(S): 50 INJECTION INTRAVENOUS at 09:55

## 2021-08-02 RX ADMIN — SODIUM CHLORIDE 30 MILLILITER(S): 9 INJECTION, SOLUTION INTRAVENOUS at 09:55

## 2021-08-02 RX ADMIN — FENTANYL CITRATE 50 MICROGRAM(S): 50 INJECTION INTRAVENOUS at 10:10

## 2021-08-02 NOTE — ASU DISCHARGE PLAN (ADULT/PEDIATRIC) - CALL YOUR DOCTOR IF YOU HAVE ANY OF THE FOLLOWING:
Fever greater than (need to indicate Fahrenheit or Celsius)/Unable to urinate/Inability to tolerate liquids or foods/Increased irritability or sluggishness

## 2021-08-02 NOTE — ASU DISCHARGE PLAN (ADULT/PEDIATRIC) - CARE PROVIDER_API CALL
Antwon Cox  Urology  [address] [place] [address]  Phone: [phone]  Fax: [phone]  Follow Up Time:

## 2021-08-02 NOTE — ASU DISCHARGE PLAN (ADULT/PEDIATRIC) - ASU DC SPECIAL INSTRUCTIONSFT
Some blood in your urine is normal.  Please call the office if you begin to see blood clots or your catheter stops draining or you are unable to urinate.     Please call the office if you experience a fever with temperature >101 F, as this could be a sign of infection.    You may take over the counter pain medication as needed.    Please hold Plavix until you follow-up with Dr. Cox on Wednesday, you may continue taking aspirin.    Please call Dr. Anh Cox's office to schedule follow-up for Wednesday for catheter removal.

## 2021-08-02 NOTE — ASU DISCHARGE PLAN (ADULT/PEDIATRIC) - NURSING INSTRUCTIONS
DO NOT take any Tylenol (Acetaminophen) or narcotics containing Tylenol until after 2:30pm . You received Tylenol during your operation and it can cause damage to your liver if too much is taken within a 24 hour time period.

## 2021-08-03 ENCOUNTER — EMERGENCY (EMERGENCY)
Facility: HOSPITAL | Age: 76
LOS: 1 days | Discharge: HOME | End: 2021-08-03
Attending: STUDENT IN AN ORGANIZED HEALTH CARE EDUCATION/TRAINING PROGRAM | Admitting: STUDENT IN AN ORGANIZED HEALTH CARE EDUCATION/TRAINING PROGRAM
Payer: MEDICARE

## 2021-08-03 VITALS
TEMPERATURE: 98 F | DIASTOLIC BLOOD PRESSURE: 72 MMHG | SYSTOLIC BLOOD PRESSURE: 114 MMHG | RESPIRATION RATE: 16 BRPM | HEART RATE: 80 BPM | OXYGEN SATURATION: 98 %

## 2021-08-03 VITALS
HEART RATE: 88 BPM | RESPIRATION RATE: 16 BRPM | HEIGHT: 70 IN | TEMPERATURE: 98 F | DIASTOLIC BLOOD PRESSURE: 79 MMHG | OXYGEN SATURATION: 100 % | SYSTOLIC BLOOD PRESSURE: 120 MMHG

## 2021-08-03 DIAGNOSIS — Z98.890 OTHER SPECIFIED POSTPROCEDURAL STATES: Chronic | ICD-10-CM

## 2021-08-03 DIAGNOSIS — Z90.49 ACQUIRED ABSENCE OF OTHER SPECIFIED PARTS OF DIGESTIVE TRACT: Chronic | ICD-10-CM

## 2021-08-03 PROBLEM — R31.0 GROSS HEMATURIA: Status: ACTIVE | Noted: 2021-08-03

## 2021-08-03 LAB
ANION GAP SERPL CALC-SCNC: 12 MMOL/L — SIGNIFICANT CHANGE UP (ref 7–14)
APPEARANCE UR: ABNORMAL
BASOPHILS # BLD AUTO: 0.02 K/UL — SIGNIFICANT CHANGE UP (ref 0–0.2)
BASOPHILS NFR BLD AUTO: 0.2 % — SIGNIFICANT CHANGE UP (ref 0–2)
BILIRUB UR-MCNC: ABNORMAL
BUN SERPL-MCNC: 15 MG/DL — SIGNIFICANT CHANGE UP (ref 7–23)
CALCIUM SERPL-MCNC: 9.4 MG/DL — SIGNIFICANT CHANGE UP (ref 8.4–10.5)
CHLORIDE SERPL-SCNC: 101 MMOL/L — SIGNIFICANT CHANGE UP (ref 98–107)
CO2 SERPL-SCNC: 25 MMOL/L — SIGNIFICANT CHANGE UP (ref 22–31)
COLOR SPEC: ABNORMAL
CREAT SERPL-MCNC: 0.75 MG/DL — SIGNIFICANT CHANGE UP (ref 0.5–1.3)
DIFF PNL FLD: ABNORMAL
EOSINOPHIL # BLD AUTO: 0.02 K/UL — SIGNIFICANT CHANGE UP (ref 0–0.5)
EOSINOPHIL NFR BLD AUTO: 0.2 % — SIGNIFICANT CHANGE UP (ref 0–6)
GLUCOSE SERPL-MCNC: 105 MG/DL — HIGH (ref 70–99)
GLUCOSE UR QL: NEGATIVE — SIGNIFICANT CHANGE UP
HCT VFR BLD CALC: 44.4 % — SIGNIFICANT CHANGE UP (ref 39–50)
HGB BLD-MCNC: 14.6 G/DL — SIGNIFICANT CHANGE UP (ref 13–17)
IANC: 8.5 K/UL — SIGNIFICANT CHANGE UP (ref 1.5–8.5)
IMM GRANULOCYTES NFR BLD AUTO: 0.3 % — SIGNIFICANT CHANGE UP (ref 0–1.5)
KETONES UR-MCNC: ABNORMAL
LEUKOCYTE ESTERASE UR-ACNC: ABNORMAL
LYMPHOCYTES # BLD AUTO: 1.72 K/UL — SIGNIFICANT CHANGE UP (ref 1–3.3)
LYMPHOCYTES # BLD AUTO: 14.7 % — SIGNIFICANT CHANGE UP (ref 13–44)
MAGNESIUM SERPL-MCNC: 1.9 MG/DL — SIGNIFICANT CHANGE UP (ref 1.6–2.6)
MCHC RBC-ENTMCNC: 30.5 PG — SIGNIFICANT CHANGE UP (ref 27–34)
MCHC RBC-ENTMCNC: 32.9 GM/DL — SIGNIFICANT CHANGE UP (ref 32–36)
MCV RBC AUTO: 92.9 FL — SIGNIFICANT CHANGE UP (ref 80–100)
MONOCYTES # BLD AUTO: 1.44 K/UL — HIGH (ref 0–0.9)
MONOCYTES NFR BLD AUTO: 12.3 % — SIGNIFICANT CHANGE UP (ref 2–14)
NEUTROPHILS # BLD AUTO: 8.5 K/UL — HIGH (ref 1.8–7.4)
NEUTROPHILS NFR BLD AUTO: 72.3 % — SIGNIFICANT CHANGE UP (ref 43–77)
NITRITE UR-MCNC: POSITIVE
NRBC # BLD: 0 /100 WBCS — SIGNIFICANT CHANGE UP
NRBC # FLD: 0 K/UL — SIGNIFICANT CHANGE UP
PH UR: 6 — SIGNIFICANT CHANGE UP (ref 5–8)
PHOSPHATE SERPL-MCNC: 2.7 MG/DL — SIGNIFICANT CHANGE UP (ref 2.5–4.5)
PLATELET # BLD AUTO: 145 K/UL — LOW (ref 150–400)
POTASSIUM SERPL-MCNC: 4.2 MMOL/L — SIGNIFICANT CHANGE UP (ref 3.5–5.3)
POTASSIUM SERPL-SCNC: 4.2 MMOL/L — SIGNIFICANT CHANGE UP (ref 3.5–5.3)
PROT UR-MCNC: >300 — SIGNIFICANT CHANGE UP
RBC # BLD: 4.78 M/UL — SIGNIFICANT CHANGE UP (ref 4.2–5.8)
RBC # FLD: 13.1 % — SIGNIFICANT CHANGE UP (ref 10.3–14.5)
SODIUM SERPL-SCNC: 138 MMOL/L — SIGNIFICANT CHANGE UP (ref 135–145)
SP GR SPEC: 1.02 — SIGNIFICANT CHANGE UP (ref 1.01–1.02)
UROBILINOGEN FLD QL: 1 — SIGNIFICANT CHANGE UP
WBC # BLD: 11.74 K/UL — HIGH (ref 3.8–10.5)
WBC # FLD AUTO: 11.74 K/UL — HIGH (ref 3.8–10.5)

## 2021-08-03 PROCEDURE — 99284 EMERGENCY DEPT VISIT MOD MDM: CPT

## 2021-08-03 RX ORDER — ACETAMINOPHEN 500 MG
650 TABLET ORAL ONCE
Refills: 0 | Status: COMPLETED | OUTPATIENT
Start: 2021-08-03 | End: 2021-08-03

## 2021-08-03 RX ADMIN — Medication 650 MILLIGRAM(S): at 09:01

## 2021-08-03 NOTE — ED PROVIDER NOTE - PATIENT PORTAL LINK FT
You can access the FollowMyHealth Patient Portal offered by [facility] by registering at the following website: [email or web address]. By joining [employer]’s FollowMyHealth portal, you will also be able to view your health information using other applications (apps) compatible with our system.

## 2021-08-03 NOTE — ED ADULT NURSE NOTE - OBJECTIVE STATEMENT
Patient A&Ox4 ambulatory with cane c/o inability to urinate. Patient is post TURP surgery on Monday morning 8/2. Patient states he went to his MD Monday evening, where they drained and placed a larger Grier, patient felt relief but states MD stated to come into emergency room for further evaluation. Patient endorses fullness in the bladder. Abdomen slightly distended. Patient states stopped blood thinner 5 days before surgery. Patient denies chest pain, dyspnea, dizziness, nausea, fever and chills. 20 gauge IV placed to left antecubital, labs drawn and sent. Family member at bedside. Stretcher in lowest position, wheels locked, appropriate side rails in place, call bell in reach.

## 2021-08-03 NOTE — ED PROVIDER NOTE - OBJECTIVE STATEMENT
74 y/o M with PMH HTN, HLD, CVA w/ left sided weakness , BPH s/p TURP yesterday w/ dr. díaz p/w increased bladder pain and hematuria w/ intermittent decreased lieberman output. Pt states he had a TURP w/ Dr. díaz and had a lieberman placed and upsized. Since going home he has been having increased blood in the urine w/ intermittent clots w/ trouble passing urine. He states he currently does not have abdominal pain, fever, chills, chest pain . he is not currently taking his plavix

## 2021-08-03 NOTE — HISTORY OF PRESENT ILLNESS
[FreeTextEntry1] : This patient is here with his wife.  And had a prostate related procedure done elsewhere today.  He underwent a catheter but no returns and clot retention

## 2021-08-03 NOTE — PHYSICAL EXAM
[General Appearance - Well Developed] : well developed [General Appearance - Well Nourished] : well nourished [Normal Appearance] : normal appearance [Well Groomed] : well groomed [General Appearance - In No Acute Distress] : no acute distress [Abdomen Soft] : soft [Abdomen Tenderness] : non-tender [Costovertebral Angle Tenderness] : no ~M costovertebral angle tenderness [Urethral Meatus] : meatus normal [Urinary Bladder Findings] : the bladder was normal on palpation [Scrotum] : the scrotum was normal [Testes Mass (___cm)] : there were no testicular masses [No Prostate Nodules] : no prostate nodules [FreeTextEntry1] : Catheter with bloody discharge and clots [Edema] : no peripheral edema [] : no respiratory distress [Respiration, Rhythm And Depth] : normal respiratory rhythm and effort [Exaggerated Use Of Accessory Muscles For Inspiration] : no accessory muscle use [Oriented To Time, Place, And Person] : oriented to person, place, and time [Affect] : the affect was normal [Mood] : the mood was normal [Not Anxious] : not anxious [Normal Station and Gait] : the gait and station were normal for the patient's age [No Focal Deficits] : no focal deficits [No Palpable Adenopathy] : no palpable adenopathy

## 2021-08-03 NOTE — ED PROVIDER NOTE - ATTENDING CONTRIBUTION TO CARE
attending wrote note  Dr. Narvaez: I have personally seen and examined this patient at the bedside. I have fully participated in the care of this patient. I have reviewed all pertinent clinical information, including history, physical exam, plan and the Resident's note and agree except as noted. HPI above as by me. PE above as by me. DDX PLAN

## 2021-08-03 NOTE — ED PROVIDER NOTE - CLINICAL SUMMARY MEDICAL DECISION MAKING FREE TEXT BOX
76 y/o M with PMH HTN, HLD, CVA w/ left sided weakness , BPH s/p TURP yesterday w/ dr. díaz p/w increased bladder pain and hematuria w/ intermittent decreased lieberman output. Pt states he had a TURP w/ Dr. díaz and had a lieberman placed and upsized  pt w/ hematuria, s/p turp no fevers, will check cbc, cmp, ua, urology consult for CBI.

## 2021-08-03 NOTE — CONSULT NOTE ADULT - SUBJECTIVE AND OBJECTIVE BOX
HPI  76 y/o M with PMH HTN, HLD, CVA w/ left sided weakness , BPH s/p button TURP yesterday w/ Dr. Cox presents with increased bladder pain and hematuria w/ intermittent decreased lieberman output. Since going home he has been having increased blood in the urine w/ intermittent clots w/ trouble passing urine. Patient went to [place] overnight who upsized his catheter to a 22Fr 3 way, irrigated him, and recommended he came to [facility] for further treatment.He states he currently does not have abdominal pain, fever, chills, chest pain . He is not currently taking his plavix.    This morning, the urology team noted that there was only 16cc in the 30cc balloon. Balloon was deflated and the catheter was irrigated. 20cc of clots were removed and the balloon was inflated with 30cc. The lieberman was then placed on traction and the color improved. After 2 hours, the color was clear light pink and the traction was taken off. One hour after being off of traction, the urine color was still clear pink.     PAST MEDICAL & SURGICAL HISTORY:  CVA (Cerebral Infarction) (ICD9 434.91)      GERD (Gastroesophageal Reflux Disease) (ICD9 530.81)    Migraine Headache (ICD9 346.90)    Diverticulitis, Intestine Large (ICD9 562.11)    Hip Fracture (ICD9 820.8)  left    Iker&#x27;s disease    Fatty liver    Anxiety and depression    Obese    S/P Colon Resection (ICD9 V45.89)  2003    History of prostate surgery  &quot;rezum procedure&quot;-2020    History of repair of left hip joint      History of cholecystectomy  2016        MEDICATIONS  (STANDING):    MEDICATIONS  (PRN):      FAMILY HISTORY:      Allergies    heparin (Unknown)    Intolerances        SOCIAL HISTORY:    REVIEW OF SYSTEMS: Otherwise negative as stated in HPI    Physical Exam  Vital signs  T(C): 36.8 (21 @ 08:22), Max: 36.8 (21 @ 08:22)  HR: 80 (21 @ 08:22)  BP: 114/72 (21 @ 08:22)  SpO2: 98% (21 @ 08:22)  Wt(kg): --    Output      Gen: NAD  Pulm: No respiratory distress	  CV: RRR  GI: S/ND/NT  : 22Fr 3 way lieberman in place draining clear light pink urine. Secured with tape.   MSK: moves all 4 limbs spontaneously    LABS:       @ 08:12    WBC 11.74 / Hct 44.4  / SCr 0.75         138  |  101  |  15  ----------------------------<  105<H>  4.2   |  25  |  0.75    Ca    9.4      03 Aug 2021 08:12  Phos  2.7       Mg     1.90             Urinalysis Basic - ( 03 Aug 2021 08:12 )    Color: Red / Appearance: Turbid / S.025 / pH: x  Gluc: x / Ketone: Trace  / Bili: Moderate / Urobili: 1.0   Blood: x / Protein: >300 / Nitrite: Positive   Leuk Esterase: Large / RBC: x / WBC x   Sq Epi: x / Non Sq Epi: x / Bacteria: x

## 2021-08-03 NOTE — ED ADULT NURSE NOTE - SUICIDE SCREENING DEPRESSION
[Normal Appetite] : normal appetite [Negative] : Allergic/Immunologic [Fever] : no fever [Chills] : no chills [Fatigue] : no fatigue [Weight Change] : no weight change Negative

## 2021-08-03 NOTE — ED PROVIDER NOTE - PROGRESS NOTE DETAILS
Pt signed out to me, Dr. Gutierrez. Pt pending uro evaluation for possible irrigation of bladder. Medically stable at this time. Labs pending results. Arlyn - urology resident staffed with Dr. Cox. They attempted bladder with traction and reassessed hematuria resolving. they do not recommend CBI at this time. Patient will follow up with Dr. Cox tomorrow for foely removal.

## 2021-08-03 NOTE — CONSULT NOTE ADULT - ASSESSMENT
74 y/o M with PMH HTN, HLD, CVA w/ left sided weakness , BPH s/p button TURP yesterday w/ Dr. Cox who presented to the ED this AM with increased bladder pain and hematuria w/ intermittent decreased lieberman output. After irrigation and traction, the patient's urine color was clear light pink. No need for CBI.   - Continue Lieberman  - f/u with Dr. Cox outpatient tomorrow for catheter removal  - recommended patient call Dr. Cox's office if he notices any change in urine color  - continue holding plavix until appointment with Dr. Cox.   - discussed plan with Dr. Cox

## 2021-08-03 NOTE — ED PROVIDER NOTE - NS ED ROS FT
denies fever, chills, chest pain, SOB, abdominal pain, diarrhea, dysuria, syncope, +bleeding, new rash,weakness, numbness, blurred vision    ROS  otherwise negative as per HPI  + hematuria

## 2021-08-03 NOTE — ED PROVIDER NOTE - PHYSICAL EXAMINATION
Gen: Awake, Alert, WD, WN, NAD  Head:  NC/AT  Eyes:  PERRL, EOMI, Conjunctiva pink, lids normal, no scleral icterus  ENT: OP clear, no exudates, no erythema, uvula midline, TMs clear bilaterally, moist mucus membranes  Neck: supple, nontender, no meningismus, no JVD, trachea midline  Cardiac/CV:  S1 S2, RRR, no M/G/R  Respiratory/Pulm:  CTAB, good air movement, normal resp effort, no wheezes/stridor/retractions/rales/rhonchi  Gastrointestinal/Abdomen:  Soft, nontender, nondistended, +BS, no rebound/guarding   lieberman in place w/ red urine   Back:  no CVAT, no MLT  Ext:  warm, well perfused, moving all extremities spontaneously, no peripheral edema, distal pulses intact  Skin: intact, no rash  Neuro:  AAOx3, sensation intact, motor 5/5 x 3 extremities LUE weakness chronic , walks with cane speech clear

## 2021-08-03 NOTE — END OF VISIT
[FreeTextEntry3] : An irrigating catheter was placed and a large amount of clots of blood was removed.  Following this it appeared that or clots mostly in the bladder and he was advised to return to the hospital where he had the procedure done for possible clot evacuation under anesthesia

## 2021-08-03 NOTE — ED PROVIDER NOTE - NSFOLLOWUPINSTRUCTIONS_ED_ALL_ED_FT
You were seen for bladder pain and blood in urine.     Seek immediate medical assistance for any new or worsening symptoms.     Follow up with Dr. Cox tomorrow.     Take Tylenol for pain 1000mg every 8 hours.

## 2021-08-04 ENCOUNTER — APPOINTMENT (OUTPATIENT)
Dept: UROLOGY | Facility: CLINIC | Age: 76
End: 2021-08-04
Payer: MEDICARE

## 2021-08-04 ENCOUNTER — OUTPATIENT (OUTPATIENT)
Dept: OUTPATIENT SERVICES | Facility: HOSPITAL | Age: 76
LOS: 1 days | End: 2021-08-04
Payer: MEDICARE

## 2021-08-04 VITALS
TEMPERATURE: 97.1 F | HEIGHT: 70 IN | SYSTOLIC BLOOD PRESSURE: 119 MMHG | HEART RATE: 108 BPM | WEIGHT: 210 LBS | RESPIRATION RATE: 17 BRPM | DIASTOLIC BLOOD PRESSURE: 73 MMHG | BODY MASS INDEX: 30.06 KG/M2

## 2021-08-04 DIAGNOSIS — Z98.890 OTHER SPECIFIED POSTPROCEDURAL STATES: Chronic | ICD-10-CM

## 2021-08-04 DIAGNOSIS — R33.9 RETENTION OF URINE, UNSPECIFIED: ICD-10-CM

## 2021-08-04 DIAGNOSIS — Z90.49 ACQUIRED ABSENCE OF OTHER SPECIFIED PARTS OF DIGESTIVE TRACT: Chronic | ICD-10-CM

## 2021-08-04 DIAGNOSIS — R35.0 FREQUENCY OF MICTURITION: ICD-10-CM

## 2021-08-04 PROCEDURE — 51700 IRRIGATION OF BLADDER: CPT | Mod: 58

## 2021-08-04 PROCEDURE — 51798 US URINE CAPACITY MEASURE: CPT

## 2021-08-04 PROCEDURE — 51700 IRRIGATION OF BLADDER: CPT

## 2021-08-11 DIAGNOSIS — N39.0 URINARY TRACT INFECTION, SITE NOT SPECIFIED: ICD-10-CM

## 2021-08-11 DIAGNOSIS — R33.9 RETENTION OF URINE, UNSPECIFIED: ICD-10-CM

## 2021-08-16 ENCOUNTER — NON-APPOINTMENT (OUTPATIENT)
Age: 76
End: 2021-08-16

## 2021-08-17 ENCOUNTER — APPOINTMENT (OUTPATIENT)
Dept: UROLOGY | Facility: CLINIC | Age: 76
End: 2021-08-17

## 2021-09-08 ENCOUNTER — APPOINTMENT (OUTPATIENT)
Dept: UROLOGY | Facility: CLINIC | Age: 76
End: 2021-09-08
Payer: MEDICARE

## 2021-09-08 PROCEDURE — 99024 POSTOP FOLLOW-UP VISIT: CPT

## 2021-09-08 PROCEDURE — 51798 US URINE CAPACITY MEASURE: CPT

## 2021-09-08 NOTE — HISTORY OF PRESENT ILLNESS
[FreeTextEntry1] : [place] for second opinion for recurrent/persistent UTI and viding issues.\par \par  h/o LUts due to BPH. Underwent Rezum 2/20. this was complicated by a UTI and treated with amoxicillin. he then developed epididymitis 3/20 confirmed on ULS treated with nitrofurantoin. he had continued issues after a urine culture grew out enterococcus ad was ID specialist who switched him to Augmentin followed by ciprofloxacin through 9/20.\par he did OK until UTI symptoms returned 2/21 - treated with nitrofurantoin followed by Ciprofloxacin. Saw ID MD who noted a MDR pseudomonas and was treated with 1 month IV Cefepime. He now has asymptomatic bacteria - or feels as such, he reports being advised to have his epididymis removed as may be source of the bacteria  though he is not sure which one and denies swelling or pain in either one. \par \par he notes a variable FOS with hesitancy but no straining he has frequency urgency and urge incontinence; if take toviaz it is better; has nocturia 304 times with occasional wetness. he stopped flomax due to dizziness.\par voided 60cc with peak FR 5cc - flat nomogram and . \par \par S/P Button TURP 8/2 - had issues with clot retention so stopped anticoagulation for a time.\par Urine clear - voiding easier with stronger streams, noted less nocturia (2).\par PVR 0 \par

## 2021-09-11 LAB — BACTERIA UR CULT: ABNORMAL

## 2021-09-11 RX ORDER — CEFUROXIME AXETIL 500 MG/1
500 TABLET ORAL
Qty: 14 | Refills: 0 | Status: ACTIVE | COMMUNITY
Start: 2021-09-11 | End: 1900-01-01

## 2021-09-13 ENCOUNTER — NON-APPOINTMENT (OUTPATIENT)
Age: 76
End: 2021-09-13

## 2021-10-19 LAB — BACTERIA UR CULT: ABNORMAL

## 2021-11-09 LAB — BACTERIA UR CULT: ABNORMAL

## 2021-11-09 RX ORDER — CIPROFLOXACIN HYDROCHLORIDE 500 MG/1
500 TABLET, FILM COATED ORAL
Qty: 20 | Refills: 0 | Status: ACTIVE | COMMUNITY
Start: 2021-11-09 | End: 1900-01-01

## 2021-11-29 LAB — BACTERIA UR CULT: NORMAL

## 2021-12-07 ENCOUNTER — NON-APPOINTMENT (OUTPATIENT)
Age: 76
End: 2021-12-07

## 2021-12-08 LAB
APPEARANCE: CLEAR
BACTERIA: NEGATIVE
BILIRUBIN URINE: NEGATIVE
BLOOD URINE: NEGATIVE
CALCIUM OXALATE CRYSTALS: ABNORMAL
COLOR: YELLOW
GLUCOSE QUALITATIVE U: NEGATIVE
HYALINE CASTS: 2 /LPF
KETONES URINE: NEGATIVE
LEUKOCYTE ESTERASE URINE: NEGATIVE
MICROSCOPIC-UA: NORMAL
NITRITE URINE: NEGATIVE
PH URINE: 6
PROTEIN URINE: NORMAL
RED BLOOD CELLS URINE: 2 /HPF
SPECIFIC GRAVITY URINE: 1.03
SQUAMOUS EPITHELIAL CELLS: 2 /HPF
UROBILINOGEN URINE: NORMAL
WHITE BLOOD CELLS URINE: 5 /HPF

## 2021-12-09 ENCOUNTER — NON-APPOINTMENT (OUTPATIENT)
Age: 76
End: 2021-12-09

## 2021-12-13 LAB — BACTERIA UR CULT: ABNORMAL

## 2021-12-14 ENCOUNTER — TRANSCRIPTION ENCOUNTER (OUTPATIENT)
Age: 76
End: 2021-12-14

## 2021-12-16 ENCOUNTER — NON-APPOINTMENT (OUTPATIENT)
Age: 76
End: 2021-12-16

## 2021-12-16 RX ORDER — CIPROFLOXACIN HYDROCHLORIDE 500 MG/1
500 TABLET, FILM COATED ORAL TWICE DAILY
Qty: 20 | Refills: 2 | Status: ACTIVE | COMMUNITY
Start: 2021-12-16 | End: 1900-01-01

## 2022-01-11 ENCOUNTER — APPOINTMENT (OUTPATIENT)
Dept: UROLOGY | Facility: CLINIC | Age: 77
End: 2022-01-11
Payer: MEDICARE

## 2022-01-11 ENCOUNTER — OUTPATIENT (OUTPATIENT)
Dept: OUTPATIENT SERVICES | Facility: HOSPITAL | Age: 77
LOS: 1 days | End: 2022-01-11
Payer: MEDICARE

## 2022-01-11 DIAGNOSIS — Z90.49 ACQUIRED ABSENCE OF OTHER SPECIFIED PARTS OF DIGESTIVE TRACT: Chronic | ICD-10-CM

## 2022-01-11 DIAGNOSIS — Z98.890 OTHER SPECIFIED POSTPROCEDURAL STATES: Chronic | ICD-10-CM

## 2022-01-11 PROCEDURE — 51701 INSERT BLADDER CATHETER: CPT

## 2022-01-12 LAB
APPEARANCE: CLEAR
BACTERIA: NEGATIVE
BILIRUBIN URINE: NEGATIVE
BLOOD URINE: NEGATIVE
CALCIUM OXALATE CRYSTALS: ABNORMAL
COLOR: YELLOW
GLUCOSE QUALITATIVE U: NEGATIVE
HYALINE CASTS: 3 /LPF
KETONES URINE: NEGATIVE
LEUKOCYTE ESTERASE URINE: ABNORMAL
MICROSCOPIC-UA: NORMAL
NITRITE URINE: NEGATIVE
PH URINE: 6
PROTEIN URINE: NORMAL
RED BLOOD CELLS URINE: 6 /HPF
SPECIFIC GRAVITY URINE: 1.03
SQUAMOUS EPITHELIAL CELLS: 1 /HPF
UROBILINOGEN URINE: NORMAL
WHITE BLOOD CELLS URINE: 34 /HPF

## 2022-01-12 NOTE — HISTORY OF PRESENT ILLNESS
[FreeTextEntry1] : [place] for second opinion for recurrent/persistent UTI and viding issues.\par h/o LUts due to BPH. Underwent Rezum 2/20. this was complicated by a UTI and treated with amoxicillin. he then developed epididymitis 3/20 confirmed on ULS treated with nitrofurantoin. he had continued issues after a urine culture grew out enterococcus ad was ID specialist who switched him to Augmentin followed by ciprofloxacin through 9/20.\par he did OK until UTI symptoms returned 2/21 - treated with nitrofurantoin followed by Ciprofloxacin. Saw ID MD who noted a MDR pseudomonas and was treated with 1 month IV Cefepime. He now has asymptomatic bacteria - or feels as such, he reports being advised to have his epididymis removed as may be source of the bacteria  though he is not sure which one and denies swelling or pain in either one. \par \par he notes a variable FOS with hesitancy but no straining he has frequency urgency and urge incontinence; if take toviaz it is better; has nocturia 304 times with occasional wetness. he stopped flomax due to dizziness.\par voided 60cc with peak FR 5cc - flat nomogram and . \par \par S/P Button TURP 8/2 - had issues with clot retention so stopped anticoagulation for a time.\par Urine clear - voiding easier with stronger streams, noted less nocturia (2).\par \par \par voids well but urine culture persistently positive - pseudomonas despsite appropriate antibiotics.\par no UTI symptoms.

## 2022-01-12 NOTE — ASSESSMENT
[FreeTextEntry1] : here for cathed urine specimen - has a fairly severe phimosis\par urine sent -  - treat as needed

## 2022-01-14 LAB — BACTERIA UR CULT: ABNORMAL

## 2022-01-14 RX ORDER — CIPROFLOXACIN HYDROCHLORIDE 500 MG/1
500 TABLET, FILM COATED ORAL TWICE DAILY
Qty: 14 | Refills: 0 | Status: ACTIVE | COMMUNITY
Start: 2021-10-19 | End: 1900-01-01

## 2022-01-18 ENCOUNTER — NON-APPOINTMENT (OUTPATIENT)
Age: 77
End: 2022-01-18

## 2022-01-28 ENCOUNTER — OUTPATIENT (OUTPATIENT)
Dept: OUTPATIENT SERVICES | Facility: HOSPITAL | Age: 77
LOS: 1 days | End: 2022-01-28
Payer: MEDICARE

## 2022-01-28 ENCOUNTER — APPOINTMENT (OUTPATIENT)
Dept: UROLOGY | Facility: CLINIC | Age: 77
End: 2022-01-28
Payer: MEDICARE

## 2022-01-28 DIAGNOSIS — N39.0 URINARY TRACT INFECTION, SITE NOT SPECIFIED: ICD-10-CM

## 2022-01-28 DIAGNOSIS — Z98.890 OTHER SPECIFIED POSTPROCEDURAL STATES: Chronic | ICD-10-CM

## 2022-01-28 DIAGNOSIS — Z90.49 ACQUIRED ABSENCE OF OTHER SPECIFIED PARTS OF DIGESTIVE TRACT: Chronic | ICD-10-CM

## 2022-01-28 PROCEDURE — 99213 OFFICE O/P EST LOW 20 MIN: CPT

## 2022-01-28 PROCEDURE — 76775 US EXAM ABDO BACK WALL LIM: CPT | Mod: 26

## 2022-01-28 PROCEDURE — 76775 US EXAM ABDO BACK WALL LIM: CPT

## 2022-01-28 NOTE — HISTORY OF PRESENT ILLNESS
[FreeTextEntry1] : edwin for second opinion for recurrent/persistent UTI and viding issues.\par h/o LUts due to BPH. Underwent Rezum 2/20. this was complicated by a UTI and treated with amoxicillin. he then developed epididymitis 3/20 confirmed on ULS treated with nitrofurantoin. he had continued issues after a urine culture grew out enterococcus ad was ID specialist who switched him to Augmentin followed by ciprofloxacin through 9/20.\par he did OK until UTI symptoms returned 2/21 - treated with nitrofurantoin followed by Ciprofloxacin. Saw ID MD who noted a MDR pseudomonas and was treated with 1 month IV Cefepime. He now has asymptomatic bacteria - or feels as such, he reports being advised to have his epididymis removed as may be source of the bacteria  though he is not sure which one and denies swelling or pain in either one. \par \par he notes a variable FOS with hesitancy but no straining he has frequency urgency and urge incontinence; if take toviaz it is better; has nocturia 304 times with occasional wetness. he stopped flomax due to dizziness.\par voided 60cc with peak FR 5cc - flat nomogram and . \par \par S/P Button TURP 8/2 - had issues with clot retention so stopped anticoagulation for a time.\par Urine clear - voiding easier with stronger streams, noted less nocturia (2).\par \par voids well but urine culture persistently positive - pseudomonas despite appropriate antibiotics.\par no UTI symptoms. LUTs remain great from the TURP and PVR @150 \par checked ULS for stones and h/o same - negative

## 2022-01-28 NOTE — ASSESSMENT
[FreeTextEntry1] : suspect he is colonized and we are chasing tails.\par will leave on methenamine and observe for now

## 2022-01-30 DIAGNOSIS — N39.0 URINARY TRACT INFECTION, SITE NOT SPECIFIED: ICD-10-CM

## 2022-01-30 DIAGNOSIS — R33.9 RETENTION OF URINE, UNSPECIFIED: ICD-10-CM

## 2022-02-04 LAB — BACTERIA UR CULT: NORMAL

## 2022-03-18 ENCOUNTER — TRANSCRIPTION ENCOUNTER (OUTPATIENT)
Age: 77
End: 2022-03-18

## 2022-03-28 DIAGNOSIS — N39.0 URINARY TRACT INFECTION, SITE NOT SPECIFIED: ICD-10-CM

## 2022-04-20 ENCOUNTER — RX RENEWAL (OUTPATIENT)
Age: 77
End: 2022-04-20

## 2022-06-09 ENCOUNTER — RX RENEWAL (OUTPATIENT)
Age: 77
End: 2022-06-09

## 2022-08-02 ENCOUNTER — APPOINTMENT (OUTPATIENT)
Dept: UROLOGY | Facility: CLINIC | Age: 77
End: 2022-08-02

## 2022-08-02 DIAGNOSIS — R33.9 RETENTION OF URINE, UNSPECIFIED: ICD-10-CM

## 2022-08-02 PROCEDURE — 99212 OFFICE O/P EST SF 10 MIN: CPT

## 2022-08-02 PROCEDURE — 51798 US URINE CAPACITY MEASURE: CPT

## 2022-08-02 NOTE — HISTORY OF PRESENT ILLNESS
[FreeTextEntry1] : edwin for second opinion for recurrent/persistent UTI and viding issues.\par h/o LUts due to BPH. Underwent Rezum 2/20. this was complicated by a UTI and treated with amoxicillin. he then developed epididymitis 3/20 confirmed on ULS treated with nitrofurantoin. he had continued issues after a urine culture grew out enterococcus ad was ID specialist who switched him to Augmentin followed by ciprofloxacin through 9/20.\par he did OK until UTI symptoms returned 2/21 - treated with nitrofurantoin followed by Ciprofloxacin. Saw ID MD who noted a MDR pseudomonas and was treated with 1 month IV Cefepime. He now has asymptomatic bacteria - or feels as such, he reports being advised to have his epididymis removed as may be source of the bacteria  though he is not sure which one and denies swelling or pain in either one. \par \par he notes a variable FOS with hesitancy but no straining he has frequency urgency and urge incontinence; if take toviaz it is better; has nocturia 304 times with occasional wetness. he stopped flomax due to dizziness.\par voided 60cc with peak FR 5cc - flat nomogram and . \par \par S/P Button TURP 8/2 - had issues with clot retention so stopped anticoagulation for a time.\par Urine clear - voiding easier with stronger streams, noted less nocturia (2).\par \par voids well but urine culture persistently positive - pseudomonas despite appropriate antibiotics.\par no UTI symptoms. LUTs remain great from the TURP and PVR @150 \par checked ULS for stones and h/o same - negative \par \par 8/22 - good FOS with less frequency urgency and leakage. no dysuria or hematuria. \par on Toviaz and methenamine.\par PVR 60

## 2022-08-03 LAB
APPEARANCE: CLEAR
BACTERIA: NEGATIVE
BILIRUBIN URINE: NEGATIVE
BLOOD URINE: NEGATIVE
COLOR: YELLOW
GLUCOSE QUALITATIVE U: NEGATIVE
HYALINE CASTS: 1 /LPF
KETONES URINE: NEGATIVE
LEUKOCYTE ESTERASE URINE: NEGATIVE
MICROSCOPIC-UA: NORMAL
NITRITE URINE: NEGATIVE
PH URINE: 6.5
PROTEIN URINE: NORMAL
RED BLOOD CELLS URINE: 2 /HPF
SPECIFIC GRAVITY URINE: 1.03
SQUAMOUS EPITHELIAL CELLS: 1 /HPF
UROBILINOGEN URINE: NORMAL
WHITE BLOOD CELLS URINE: 2 /HPF

## 2022-08-23 ENCOUNTER — NON-APPOINTMENT (OUTPATIENT)
Age: 77
End: 2022-08-23

## 2022-10-24 ENCOUNTER — RX RENEWAL (OUTPATIENT)
Age: 77
End: 2022-10-24

## 2023-03-14 ENCOUNTER — NON-APPOINTMENT (OUTPATIENT)
Age: 78
End: 2023-03-14

## 2023-03-18 ENCOUNTER — RX RENEWAL (OUTPATIENT)
Age: 78
End: 2023-03-18

## 2023-03-18 RX ORDER — FESOTERODINE FUMARATE 4 MG/1
4 TABLET, FILM COATED, EXTENDED RELEASE ORAL
Qty: 90 | Refills: 0 | Status: ACTIVE | COMMUNITY
Start: 2023-03-18 | End: 1900-01-01

## 2023-03-28 ENCOUNTER — APPOINTMENT (OUTPATIENT)
Dept: UROLOGY | Facility: CLINIC | Age: 78
End: 2023-03-28
Payer: MEDICARE

## 2023-03-28 PROCEDURE — 99213 OFFICE O/P EST LOW 20 MIN: CPT

## 2023-03-28 NOTE — HISTORY OF PRESENT ILLNESS
[FreeTextEntry1] : [facility] for second opinion for recurrent/persistent UTI and viding issues.\par h/o LUts due to BPH. Underwent Rezum 2/20. this was complicated by a UTI and treated with amoxicillin. he then developed epididymitis 3/20 confirmed on ULS treated with nitrofurantoin. he had continued issues after a urine culture grew out enterococcus ad was ID specialist who switched him to Augmentin followed by ciprofloxacin through 9/20.\par he did OK until UTI symptoms returned 2/21 - treated with nitrofurantoin followed by Ciprofloxacin. Saw ID MD who noted a MDR pseudomonas and was treated with 1 month IV Cefepime. He now has asymptomatic bacteria - or feels as such, he reports being advised to have his epididymis removed as may be source of the bacteria  though he is not sure which one and denies swelling or pain in either one. \par \par he notes a variable FOS with hesitancy but no straining he has frequency urgency and urge incontinence; if take toviaz it is better; has nocturia 304 times with occasional wetness. he stopped flomax due to dizziness.\par voided 60cc with peak FR 5cc - flat nomogram and . \par \par S/P Button TURP 8/2 - had issues with clot retention so stopped anticoagulation for a time.\par Urine clear - voiding easier with stronger streams, noted less nocturia (2).\par \par voids well but urine culture persistently positive - pseudomonas despite appropriate antibiotics.\par no UTI symptoms. LUTs remain great from the TURP and PVR @150 \par checked ULS for stones and h/o same - negative \par \par 8/22 - good FOS with less frequency urgency and leakage. no dysuria or hematuria. \par on Toviaz and methenamine.\par PVR 60 \par \par 3/23 - here as last week had a fever to 101 with chills, then resolved. No dysuria, hematuria or increased urgency.frequency

## 2023-03-29 LAB
BILIRUB UR QL STRIP: NEGATIVE
CLARITY UR: CLEAR
COLLECTION METHOD: NORMAL
GLUCOSE UR-MCNC: NEGATIVE
HCG UR QL: 0.2 EU/DL
HGB UR QL STRIP.AUTO: NEGATIVE
KETONES UR-MCNC: NEGATIVE
LEUKOCYTE ESTERASE UR QL STRIP: NORMAL
NITRITE UR QL STRIP: NEGATIVE
PH UR STRIP: 6.5
PROT UR STRIP-MCNC: NEGATIVE
SP GR UR STRIP: 1.02

## 2023-04-01 LAB — BACTERIA UR CULT: ABNORMAL

## 2023-07-26 ENCOUNTER — APPOINTMENT (OUTPATIENT)
Dept: HEPATOLOGY | Facility: CLINIC | Age: 78
End: 2023-07-26
Payer: MEDICARE

## 2023-07-26 DIAGNOSIS — R74.8 ABNORMAL LEVELS OF OTHER SERUM ENZYMES: ICD-10-CM

## 2023-07-26 DIAGNOSIS — K76.0 FATTY (CHANGE OF) LIVER, NOT ELSEWHERE CLASSIFIED: ICD-10-CM

## 2023-07-26 PROCEDURE — 76981 USE PARENCHYMA: CPT | Mod: 26

## 2023-08-01 ENCOUNTER — APPOINTMENT (OUTPATIENT)
Dept: UROLOGY | Facility: CLINIC | Age: 78
End: 2023-08-01

## 2023-09-29 ENCOUNTER — APPOINTMENT (OUTPATIENT)
Dept: UROLOGY | Facility: CLINIC | Age: 78
End: 2023-09-29
Payer: MEDICARE

## 2023-09-29 VITALS — DIASTOLIC BLOOD PRESSURE: 78 MMHG | SYSTOLIC BLOOD PRESSURE: 119 MMHG | HEART RATE: 90 BPM

## 2023-09-29 PROCEDURE — 99213 OFFICE O/P EST LOW 20 MIN: CPT

## 2024-03-17 NOTE — ASU PREOP CHECKLIST - BSA (M2)
Patient Name: Deep Mendez  : 1966    MRN: [id number]                              Today's Date: 3/17/2024       Admit Date: 3/12/2024    Visit Dx:     ICD-10-CM ICD-9-CM   1. Perforated appendicitis  K35.32 540.0   2. Impaired mobility and activities of daily living  Z74.09 V49.89    Z78.9    3. Acute appendicitis with perforation, localized peritonitis, abscess, and gangrene  K35.33 540.1   4. S/P laparoscopic appendectomy  Z90.49 V45.89     Patient Active Problem List   Diagnosis    Acute appendicitis with perforation, localized peritonitis, abscess, and gangrene    Type 2 diabetes mellitus, with long-term current use of insulin    Essential hypertension    Rupture of appendix    TYRELL (acute kidney injury)    Leukocytosis    Hyponatremia     Past Medical History:   Diagnosis Date    Diabetes mellitus     Hypertension     Impaired mobility      Past Surgical History:   Procedure Laterality Date    APPENDECTOMY N/A 3/13/2024    Procedure: APPENDECTOMY LAPAROSCOPIC;  Surgeon: Yazmin Zayas MD;  Location: [facility];  Service: General;  Laterality: N/A;    BELOW KNEE LEG AMPUTATION      EYE SURGERY        General Information       Row Name 24          Physical Therapy Time and Intention    Document Type therapy note (daily note)  -CC     Mode of Treatment physical therapy  -CC       Row Name 24          General Information    Patient Profile Reviewed yes  -CC     Existing Precautions/Restrictions fall  -CC       Row Name 24          Safety Issues, Functional Mobility    Safety Issues Affecting Function (Mobility) awareness of need for assistance;insight into deficits/self-awareness;positioning of assistive device;safety precautions follow-through/compliance  -CC     Impairments Affecting Function (Mobility) endurance/activity tolerance;strength;pain;motor planning  -CC               User Key  (r) = Recorded By, (t) = Taken By, (c) = Cosigned By      Initials Name Provider  Type    CC Christy Nava PTA Physical Therapist Assistant                   Mobility       Row Name 03/17/24 1731          Sit-Stand Transfer    Sit-Stand [place] (Transfers) contact guard;standby assist;verbal cues  -     Assistive Device (Sit-Stand Transfers) walker, front-wheeled  -       Row Name 03/17/24 1731          Gait/Stairs (Locomotion)    [place] Level (Gait) standby assist  -     Assistive Device (Gait) walker, front-wheeled  -     Patient was able to Ambulate yes  -     Distance in Feet (Gait) 53  -CC     Pattern (Gait) step-through  -     Deviations/Abnormal Patterns (Gait) huber decreased;gait speed decreased;bilateral deviations  -     Bilateral Gait Deviations forward flexed posture;heel strike decreased  -               User Key  (r) = Recorded By, (t) = Taken By, (c) = Cosigned By      Initials Name Provider Type    CC Christy Nava PTA Physical Therapist Assistant                   Obj/Interventions    No documentation.                  Goals/Plan    No documentation.                  Clinical Impression       Row Name 03/17/24 1732          Pain    Pretreatment Pain Rating 4/10  -     Posttreatment Pain Rating 4/10  -     Pain Location - abdomen  -     Pain Intervention(s) Repositioned;Ambulation/increased activity  -     Additional Documentation Pain Scale: Numbers Pre/Post-Treatment (Group)  -       Row Name 03/17/24 1732          Plan of Care Review    Plan of Care Reviewed With patient  -CC     Progress improving  -     Outcome Evaluation Pt agreeable to physical therapy. Performed sit <->stand with SBA with VC for hand placement, amb with RW 53 feet with flexed posture and VC for closer proximity to AD especially during turns to decrease risk of tripping self with RW. Performed B LE ex in sitting LAQ, hip abd, marching and reclined QS and glute sets 1x10 reps and R AP 1x10 reps. Pt donned and doffed L BKA  prothesis independently. Con't  with PT POC and progress as tolerated  -CC       Row Name 03/17/24 1732          Positioning and Restraints    Pre-Treatment Position sitting in chair/recliner  -CC     Post Treatment Position chair  -CC     In Chair notified nsg;reclined;call light within reach;encouraged to call for assist  -CC               User Key  (r) = Recorded By, (t) = Taken By, (c) = Cosigned By      Initials Name Provider Type    CC Christy Nava PTA Physical Therapist Assistant                   Outcome Measures       Row Name 03/17/24 1735 03/17/24 1200       How much help from another person do you currently need...    Turning from your back to your side while in flat bed without using bedrails? 3  -CC 3  -HS    Moving from lying on back to sitting on the side of a flat bed without bedrails? 4  -CC 4  -HS    Moving to and from a bed to a chair (including a wheelchair)? 3  -CC 3  -HS    Standing up from a chair using your arms (e.g., wheelchair, bedside chair)? 3  -CC 2  -HS    Climbing 3-5 steps with a railing? 2  -CC 3  -HS    To walk in hospital room? 3  -CC 3  -HS    AM-PAC 6 Clicks Score (PT) 18  -CC 18  -HS    Highest Level of Mobility Goal 6 --> Walk 10 steps or more  -CC 6 --> Walk 10 steps or more  -HS      Row Name 03/17/24 1735          Functional Assessment    Outcome Measure Options AM-PAC 6 Clicks Basic Mobility (PT)  -CC               User Key  (r) = Recorded By, (t) = Taken By, (c) = Cosigned By      Initials Name Provider Type    Christy Gauthier PTA Physical Therapist Assistant     Dionne Collazo RN Registered Nurse                                 Physical Therapy Education       Title: PT OT SLP Therapies (In Progress)       Topic: Physical Therapy (In Progress)       Point: Mobility training (Done)       Learning Progress Summary             Patient Acceptance, E,TB, VU by  at 3/14/2024 1125    Comment: educated pt on importance of OOB mobility during IP admission                         Point: Home  exercise program (Done)       Learning Progress Summary             Patient Acceptance, E,TB,D, VU,NR by  at 3/15/2024 1508    Comment: Exercise techniques                         Point: Body mechanics (Done)       Learning Progress Summary             Patient Acceptance, E,TB, VU by  at 3/17/2024 1736    Comment: upright posture and closer proximity to AD    Acceptance, E,TB, VU by  at 3/16/2024 1723    Comment: upright psoture in standing                         Point: Precautions (Not Started)       Learner Progress:  Not documented in this visit.                              User Key       Initials Effective Dates Name Provider Type Discipline    CC 06/16/21 -  Christy Nava, PTA Physical Therapist Assistant PT     06/16/21 -  Chai Mcclure, PTA Physical Therapist Assistant PT     11/29/23 -  Rachelle Sharp, PT Physical Therapist PT                  PT Recommendation and Plan     Plan of Care Reviewed With: patient  Progress: improving  Outcome Evaluation: Pt agreeable to physical therapy. Performed sit <->stand with SBA with VC for hand placement, amb with RW 53 feet with flexed posture and VC for closer proximity to AD especially during turns to decrease risk of tripping self with RW. Performed B LE ex in sitting LAQ, hip abd, marching and reclined QS and glute sets 1x10 reps and R AP 1x10 reps. Pt donned and doffed L BKA  prothesis independently. Con't with PT POC and progress as tolerated     Time Calculation:         PT Charges       Row Name 03/17/24 1736             Time Calculation    PT Received On 03/17/24  -CC      PT Goal Re-Cert Due Date 03/24/24  -CC         Time Calculation- PT    Total Timed Code Minutes- PT 32 minute(s)  -CC         Timed Charges    68789 - PT Therapeutic Exercise Minutes 15  -CC      51384 - Gait Training Minutes  16  -CC         Total Minutes    Timed Charges Total Minutes 31  -CC       Total Minutes 31  -CC                User Key  (r) = Recorded By, (t) =  Taken By, (c) = Cosigned By      Initials Name Provider Type    CC Christy Nava, TIGRE Physical Therapist Assistant                  Therapy Charges for Today       Code Description Service Date Service Provider Modifiers Qty    49790242283 HC PT THER PROC EA 15 MIN 3/16/2024 Christy Nava, TIGRE GP 1    22928894526 HC PT THERAPEUTIC ACT EA 15 MIN 3/16/2024 Christy Nava, TIGRE GP 1    17403475467 HC PT THER PROC EA 15 MIN 3/17/2024 Christy Nava, TIGRE GP 1    31557891194 HC GAIT TRAINING EA 15 MIN 3/17/2024 Christy Nava, TIGRE GP 1            PT G-Codes  Outcome Measure Options: AM-PAC 6 Clicks Basic Mobility (PT)  AM-PAC 6 Clicks Score (PT): 18  AM-PAC 6 Clicks Score (OT): 21       Christy Nava PTA  3/17/2024     2.14

## 2024-03-29 ENCOUNTER — APPOINTMENT (OUTPATIENT)
Dept: UROLOGY | Facility: CLINIC | Age: 79
End: 2024-03-29
Payer: MEDICARE

## 2024-03-29 VITALS
HEART RATE: 97 BPM | BODY MASS INDEX: 28.63 KG/M2 | DIASTOLIC BLOOD PRESSURE: 92 MMHG | OXYGEN SATURATION: 95 % | WEIGHT: 200 LBS | RESPIRATION RATE: 16 BRPM | TEMPERATURE: 97.8 F | HEIGHT: 70 IN | SYSTOLIC BLOOD PRESSURE: 126 MMHG

## 2024-03-29 DIAGNOSIS — N39.0 URINARY TRACT INFECTION, SITE NOT SPECIFIED: ICD-10-CM

## 2024-03-29 DIAGNOSIS — N39.41 URGE INCONTINENCE: ICD-10-CM

## 2024-03-29 PROCEDURE — G2211 COMPLEX E/M VISIT ADD ON: CPT

## 2024-03-29 PROCEDURE — 99213 OFFICE O/P EST LOW 20 MIN: CPT

## 2024-03-29 RX ORDER — METHENAMINE HIPPURATE 1 G/1
1 TABLET ORAL DAILY
Qty: 30 | Refills: 5 | Status: ACTIVE | COMMUNITY
Start: 2022-01-14 | End: 1900-01-01

## 2024-03-29 RX ORDER — FESOTERODINE FUMARATE 4 MG/1
4 TABLET, FILM COATED, EXTENDED RELEASE ORAL
Qty: 90 | Refills: 3 | Status: ACTIVE | COMMUNITY
Start: 2021-09-08 | End: 1900-01-01

## 2024-03-29 NOTE — HISTORY OF PRESENT ILLNESS
[FreeTextEntry1] : [facility] for second opinion for recurrent/persistent UTI and viding issues. h/o LUts due to BPH. Underwent Rezum 2/20. this was complicated by a UTI and treated with amoxicillin. he then developed epididymitis 3/20 confirmed on ULS treated with nitrofurantoin. he had continued issues after a urine culture grew out enterococcus ad was ID specialist who switched him to Augmentin followed by ciprofloxacin through 9/20. he did OK until UTI symptoms returned 2/21 - treated with nitrofurantoin followed by Ciprofloxacin. Saw ID MD who noted a MDR pseudomonas and was treated with 1 month IV Cefepime. He now has asymptomatic bacteria - or feels as such, he reports being advised to have his epididymis removed as may be source of the bacteria  though he is not sure which one and denies swelling or pain in either one.   he notes a variable FOS with hesitancy but no straining he has frequency urgency and urge incontinence; if take toviaz it is better; has nocturia 304 times with occasional wetness. he stopped flomax due to dizziness. voided 60cc with peak FR 5cc - flat nomogram and .   S/P Button TURP 8/2 - had issues with clot retention so stopped anticoagulation for a time. Urine clear - voiding easier with stronger streams, noted less nocturia (2).  voids well but urine culture persistently positive - pseudomonas despite appropriate antibiotics. no UTI symptoms. LUTs remain great from the TURP and PVR @150  checked ULS for stones and h/o same - negative   8/22 - good FOS with less frequency urgency and leakage. no dysuria or hematuria.  on Toviaz and methenamine. PVR 60   3/23 - here as last week had a fever to 101 with chills, then resolved. No dysuria, hematuria or increased urgency.frequency   9/23 on same two meds: toviaz and methenamine No UTI or UTI symptoms since last visit./   3/24 - same Toviaz and methenamine. overall feels voids less frequently and nocturia once .,  UTI symptoms or hematuria.  no

## 2024-05-23 NOTE — ASU DISCHARGE PLAN (ADULT/PEDIATRIC) - A. DRIVE A CAR, OPERATE POWER TOOLS OR MACHINERY
Statement Selected Detail Level: Detailed Quality 358: Patient-Centered Surgical Risk Assessment And Communication: Documentation of patient-specific risk assessment with a risk calculator based on multi-institutional clinical data, the specific risk calculator used, and communication of risk assessment from risk calculator with the patient or family.

## 2024-06-19 NOTE — ASU PREOP CHECKLIST - BLOOD AVAILABLE
PRE-SEDATION ASSESSMENT    CONSENT  Risks, benefits, and alternatives have been discussed with the patient/patient representative, and patient/patient representative agrees to proceed: Yes    MEDICAL HISTORY  Significant medical/surgical history: No  Past Complications with Sedation/Anesthesia: No  Significant Family History: No  Smoking History: No  Alcohol/Drug abuse: No  Possible Pregnancy (LMP): No  Cardiac History: Yes  Respiratory History: No    PHYSICAL EXAM  History and Physical Reviewed: H&P completed today  Airway Risk History: No previous history;No previous complications  Airway Anatomy : Class II  Heart : Normal  Lungs : Normal  LOC/Mental Status : Normal    OTHER FINDINGS  Reviewed current medications and allergies: Yes  Pertinent lab/diagnostic test reviewed: Yes    SEDATION RISK ASSESSMENT  Risk Status ASA: Class II - Normal patient with mild systemic disease  Plan for Sedation: Moderate Sedation  EKG Monitoring: Yes    NARRATIVE FINDINGS     
n/a

## 2024-08-27 ENCOUNTER — INPATIENT (INPATIENT)
Facility: HOSPITAL | Age: 79
LOS: 1 days | Discharge: HOME | DRG: 690 | End: 2024-08-29
Attending: STUDENT IN AN ORGANIZED HEALTH CARE EDUCATION/TRAINING PROGRAM | Admitting: HOSPITALIST
Payer: MEDICARE

## 2024-08-27 VITALS
HEIGHT: 70 IN | DIASTOLIC BLOOD PRESSURE: 75 MMHG | TEMPERATURE: 97 F | RESPIRATION RATE: 18 BRPM | OXYGEN SATURATION: 95 % | HEART RATE: 94 BPM | SYSTOLIC BLOOD PRESSURE: 119 MMHG

## 2024-08-27 DIAGNOSIS — Z98.890 OTHER SPECIFIED POSTPROCEDURAL STATES: Chronic | ICD-10-CM

## 2024-08-27 DIAGNOSIS — J18.9 PNEUMONIA, UNSPECIFIED ORGANISM: ICD-10-CM

## 2024-08-27 DIAGNOSIS — Z90.49 ACQUIRED ABSENCE OF OTHER SPECIFIED PARTS OF DIGESTIVE TRACT: Chronic | ICD-10-CM

## 2024-08-27 LAB
ALBUMIN SERPL ELPH-MCNC: 3.3 G/DL — SIGNIFICANT CHANGE UP (ref 3.3–5)
ALP SERPL-CCNC: 65 U/L — SIGNIFICANT CHANGE UP (ref 40–120)
ALT FLD-CCNC: 40 U/L — SIGNIFICANT CHANGE UP (ref 12–78)
ANION GAP SERPL CALC-SCNC: 3 MMOL/L — LOW (ref 5–17)
APPEARANCE UR: CLEAR — SIGNIFICANT CHANGE UP
AST SERPL-CCNC: 36 U/L — SIGNIFICANT CHANGE UP (ref 15–37)
BACTERIA # UR AUTO: ABNORMAL /HPF
BASE EXCESS BLDV CALC-SCNC: 2.9 MMOL/L — SIGNIFICANT CHANGE UP (ref -2–3)
BASOPHILS # BLD AUTO: 0.04 K/UL — SIGNIFICANT CHANGE UP (ref 0–0.2)
BASOPHILS NFR BLD AUTO: 0.7 % — SIGNIFICANT CHANGE UP (ref 0–2)
BILIRUB SERPL-MCNC: 0.4 MG/DL — SIGNIFICANT CHANGE UP (ref 0.2–1.2)
BILIRUB UR-MCNC: NEGATIVE — SIGNIFICANT CHANGE UP
BUN SERPL-MCNC: 22 MG/DL — SIGNIFICANT CHANGE UP (ref 7–23)
CALCIUM SERPL-MCNC: 9.7 MG/DL — SIGNIFICANT CHANGE UP (ref 8.5–10.1)
CAST: 0 /LPF — SIGNIFICANT CHANGE UP (ref 0–4)
CHLORIDE SERPL-SCNC: 110 MMOL/L — HIGH (ref 96–108)
CO2 SERPL-SCNC: 28 MMOL/L — SIGNIFICANT CHANGE UP (ref 22–31)
COLOR SPEC: YELLOW — SIGNIFICANT CHANGE UP
CREAT SERPL-MCNC: 0.8 MG/DL — SIGNIFICANT CHANGE UP (ref 0.5–1.3)
DIFF PNL FLD: NEGATIVE — SIGNIFICANT CHANGE UP
EGFR: 91 ML/MIN/1.73M2 — SIGNIFICANT CHANGE UP
EOSINOPHIL # BLD AUTO: 0.22 K/UL — SIGNIFICANT CHANGE UP (ref 0–0.5)
EOSINOPHIL NFR BLD AUTO: 3.8 % — SIGNIFICANT CHANGE UP (ref 0–6)
FLUAV AG NPH QL: SIGNIFICANT CHANGE UP
FLUBV AG NPH QL: SIGNIFICANT CHANGE UP
GAS PNL BLDV: SIGNIFICANT CHANGE UP
GLUCOSE SERPL-MCNC: 102 MG/DL — HIGH (ref 70–99)
GLUCOSE UR QL: NEGATIVE MG/DL — SIGNIFICANT CHANGE UP
HCO3 BLDV-SCNC: 30 MMOL/L — HIGH (ref 22–29)
HCT VFR BLD CALC: 44.7 % — SIGNIFICANT CHANGE UP (ref 39–50)
HGB BLD-MCNC: 14.8 G/DL — SIGNIFICANT CHANGE UP (ref 13–17)
IMM GRANULOCYTES NFR BLD AUTO: 0.3 % — SIGNIFICANT CHANGE UP (ref 0–0.9)
KETONES UR-MCNC: NEGATIVE MG/DL — SIGNIFICANT CHANGE UP
LACTATE SERPL-SCNC: 1.2 MMOL/L — SIGNIFICANT CHANGE UP (ref 0.7–2)
LEUKOCYTE ESTERASE UR-ACNC: ABNORMAL
LYMPHOCYTES # BLD AUTO: 1.58 K/UL — SIGNIFICANT CHANGE UP (ref 1–3.3)
LYMPHOCYTES # BLD AUTO: 27.6 % — SIGNIFICANT CHANGE UP (ref 13–44)
MCHC RBC-ENTMCNC: 31 PG — SIGNIFICANT CHANGE UP (ref 27–34)
MCHC RBC-ENTMCNC: 33.1 GM/DL — SIGNIFICANT CHANGE UP (ref 32–36)
MCV RBC AUTO: 93.7 FL — SIGNIFICANT CHANGE UP (ref 80–100)
MONOCYTES # BLD AUTO: 0.71 K/UL — SIGNIFICANT CHANGE UP (ref 0–0.9)
MONOCYTES NFR BLD AUTO: 12.4 % — SIGNIFICANT CHANGE UP (ref 2–14)
NEUTROPHILS # BLD AUTO: 3.15 K/UL — SIGNIFICANT CHANGE UP (ref 1.8–7.4)
NEUTROPHILS NFR BLD AUTO: 55.2 % — SIGNIFICANT CHANGE UP (ref 43–77)
NITRITE UR-MCNC: NEGATIVE — SIGNIFICANT CHANGE UP
PCO2 BLDV: 54 MMHG — SIGNIFICANT CHANGE UP (ref 42–55)
PH BLDV: 7.35 — SIGNIFICANT CHANGE UP (ref 7.32–7.43)
PH UR: 5.5 — SIGNIFICANT CHANGE UP (ref 5–8)
PLATELET # BLD AUTO: 146 K/UL — LOW (ref 150–400)
PO2 BLDV: 42 MMHG — SIGNIFICANT CHANGE UP (ref 25–45)
POTASSIUM SERPL-MCNC: 4.5 MMOL/L — SIGNIFICANT CHANGE UP (ref 3.5–5.3)
POTASSIUM SERPL-SCNC: 4.5 MMOL/L — SIGNIFICANT CHANGE UP (ref 3.5–5.3)
PROT SERPL-MCNC: 6.8 GM/DL — SIGNIFICANT CHANGE UP (ref 6–8.3)
PROT UR-MCNC: NEGATIVE MG/DL — SIGNIFICANT CHANGE UP
RBC # BLD: 4.77 M/UL — SIGNIFICANT CHANGE UP (ref 4.2–5.8)
RBC # FLD: 13 % — SIGNIFICANT CHANGE UP (ref 10.3–14.5)
RBC CASTS # UR COMP ASSIST: 2 /HPF — SIGNIFICANT CHANGE UP (ref 0–4)
RSV RNA NPH QL NAA+NON-PROBE: SIGNIFICANT CHANGE UP
SAO2 % BLDV: 76 % — SIGNIFICANT CHANGE UP (ref 67–88)
SARS-COV-2 RNA SPEC QL NAA+PROBE: SIGNIFICANT CHANGE UP
SODIUM SERPL-SCNC: 141 MMOL/L — SIGNIFICANT CHANGE UP (ref 135–145)
SP GR SPEC: 1.03 — SIGNIFICANT CHANGE UP (ref 1–1.03)
SQUAMOUS # UR AUTO: 1 /HPF — SIGNIFICANT CHANGE UP (ref 0–5)
TROPONIN I, HIGH SENSITIVITY RESULT: 6.58 NG/L — SIGNIFICANT CHANGE UP
TSH SERPL-MCNC: 2.55 UU/ML — SIGNIFICANT CHANGE UP (ref 0.34–4.82)
UROBILINOGEN FLD QL: 1 MG/DL — SIGNIFICANT CHANGE UP (ref 0.2–1)
WBC # BLD: 5.72 K/UL — SIGNIFICANT CHANGE UP (ref 3.8–10.5)
WBC # FLD AUTO: 5.72 K/UL — SIGNIFICANT CHANGE UP (ref 3.8–10.5)
WBC UR QL: 37 /HPF — HIGH (ref 0–5)

## 2024-08-27 PROCEDURE — 93010 ELECTROCARDIOGRAM REPORT: CPT

## 2024-08-27 PROCEDURE — 97162 PT EVAL MOD COMPLEX 30 MIN: CPT | Mod: GP

## 2024-08-27 PROCEDURE — 80048 BASIC METABOLIC PNL TOTAL CA: CPT

## 2024-08-27 PROCEDURE — 97530 THERAPEUTIC ACTIVITIES: CPT | Mod: GP

## 2024-08-27 PROCEDURE — 99222 1ST HOSP IP/OBS MODERATE 55: CPT

## 2024-08-27 PROCEDURE — 71250 CT THORAX DX C-: CPT | Mod: MC

## 2024-08-27 PROCEDURE — 71045 X-RAY EXAM CHEST 1 VIEW: CPT | Mod: 26

## 2024-08-27 PROCEDURE — 36415 COLL VENOUS BLD VENIPUNCTURE: CPT

## 2024-08-27 PROCEDURE — 99285 EMERGENCY DEPT VISIT HI MDM: CPT | Mod: FS

## 2024-08-27 PROCEDURE — 93306 TTE W/DOPPLER COMPLETE: CPT

## 2024-08-27 PROCEDURE — 81001 URINALYSIS AUTO W/SCOPE: CPT

## 2024-08-27 PROCEDURE — 71250 CT THORAX DX C-: CPT | Mod: 26

## 2024-08-27 PROCEDURE — 85025 COMPLETE CBC W/AUTO DIFF WBC: CPT

## 2024-08-27 PROCEDURE — 97116 GAIT TRAINING THERAPY: CPT | Mod: GP

## 2024-08-27 PROCEDURE — 76376 3D RENDER W/INTRP POSTPROCES: CPT

## 2024-08-27 RX ORDER — CYANOCOBALAMIN (VITAMIN B-12) 500MCG/0.1
1 GEL (ML) NASAL
Refills: 0 | DISCHARGE

## 2024-08-27 RX ORDER — SODIUM CHLORIDE 9 MG/ML
1000 INJECTION INTRAMUSCULAR; INTRAVENOUS; SUBCUTANEOUS
Refills: 0 | Status: DISCONTINUED | OUTPATIENT
Start: 2024-08-27 | End: 2024-08-29

## 2024-08-27 RX ORDER — METHENAMINE MANDELATE 1 G
1 TABLET ORAL
Refills: 0 | DISCHARGE

## 2024-08-27 RX ORDER — OXYBUTYNIN CHLORIDE 5 MG/1
5 TABLET ORAL
Refills: 0 | Status: DISCONTINUED | OUTPATIENT
Start: 2024-08-27 | End: 2024-08-29

## 2024-08-27 RX ORDER — AZITHROMYCIN 500 MG/1
500 TABLET, FILM COATED ORAL ONCE
Refills: 0 | Status: COMPLETED | OUTPATIENT
Start: 2024-08-27 | End: 2024-08-27

## 2024-08-27 RX ORDER — POTASSIUM CITRATE 10 MEQ/1
1 TABLET, EXTENDED RELEASE ORAL
Refills: 0 | DISCHARGE

## 2024-08-27 RX ORDER — ACETAMINOPHEN 325 MG/1
650 TABLET ORAL EVERY 6 HOURS
Refills: 0 | Status: DISCONTINUED | OUTPATIENT
Start: 2024-08-27 | End: 2024-08-29

## 2024-08-27 RX ORDER — SODIUM CHLORIDE 0.65 %
2 AEROSOL, SPRAY (ML) NASAL
Refills: 0 | DISCHARGE

## 2024-08-27 RX ORDER — ASCORBIC ACID/ASCORBATE SODIUM 500 MG
1 TABLET,CHEWABLE ORAL
Refills: 0 | DISCHARGE

## 2024-08-27 RX ORDER — FOLIC ACID/MULTIVIT,IRON,MINER 0.4MG-18MG
1000 TABLET,CHEWABLE ORAL DAILY
Refills: 0 | Status: DISCONTINUED | OUTPATIENT
Start: 2024-08-27 | End: 2024-08-29

## 2024-08-27 RX ORDER — PAROXETINE 10 MG/1
20 TABLET, FILM COATED ORAL DAILY
Refills: 0 | Status: DISCONTINUED | OUTPATIENT
Start: 2024-08-27 | End: 2024-08-29

## 2024-08-27 RX ORDER — ONDANSETRON 2 MG/ML
4 INJECTION, SOLUTION INTRAMUSCULAR; INTRAVENOUS EVERY 8 HOURS
Refills: 0 | Status: DISCONTINUED | OUTPATIENT
Start: 2024-08-27 | End: 2024-08-29

## 2024-08-27 RX ORDER — FOLIC ACID/MULTIVIT,IRON,MINER 0.4MG-18MG
1 TABLET,CHEWABLE ORAL
Refills: 0 | DISCHARGE

## 2024-08-27 RX ORDER — POVIDONE, PROPYLENE GLYCOL 6.8; 3 MG/ML; MG/ML
1 LIQUID OPHTHALMIC
Refills: 0 | DISCHARGE

## 2024-08-27 RX ORDER — FESOTERODINE FUMARATE 4 MG/1
1 TABLET, FILM COATED, EXTENDED RELEASE ORAL
Refills: 0 | DISCHARGE

## 2024-08-27 RX ORDER — MAGNESIUM, ALUMINUM HYDROXIDE 200-225/5
30 SUSPENSION, ORAL (FINAL DOSE FORM) ORAL EVERY 4 HOURS
Refills: 0 | Status: DISCONTINUED | OUTPATIENT
Start: 2024-08-27 | End: 2024-08-29

## 2024-08-27 RX ADMIN — OXYBUTYNIN CHLORIDE 5 MILLIGRAM(S): 5 TABLET ORAL at 22:44

## 2024-08-27 RX ADMIN — Medication 1000 MILLIGRAM(S): at 14:30

## 2024-08-27 RX ADMIN — AZITHROMYCIN 255 MILLIGRAM(S): 500 TABLET, FILM COATED ORAL at 14:30

## 2024-08-27 RX ADMIN — Medication 80 MILLIGRAM(S): at 22:42

## 2024-08-27 NOTE — ED PROVIDER NOTE - CLINICAL SUMMARY MEDICAL DECISION MAKING FREE TEXT BOX
pt with weakness fever and chills will get labs including blood cultures and lacte ua r/o infection not septic will give empiric antibiotics

## 2024-08-27 NOTE — ED PROVIDER NOTE - ATTENDING APP SHARED VISIT CONTRIBUTION OF CARE
Dr. Genao: I performed a face to face bedside interview with patient regarding history of present illness, review of symptoms and past medical history. I completed an independent physical exam.  I have discussed patient's plan of care with PA.   I agree with note as stated above, having amended the EMR as needed to reflect my findings.   This includes HISTORY OF PRESENT ILLNESS, HIV, PAST MEDICAL/SURGICAL/FAMILY/SOCIAL HISTORY, ALLERGIES AND HOME MEDICATIONS, REVIEW OF SYSTEMS, PHYSICAL EXAM, and any PROGRESS NOTES during the time I functioned as the attending physician for this patient.  TOÑO Genao DO

## 2024-08-27 NOTE — ED PROVIDER NOTE - NSICDXPASTMEDICALHX_GEN_ALL_CORE_FT
PAST MEDICAL HISTORY:  Anxiety and depression     CVA (Cerebral Infarction) (ICD9 434.91) 2008    Diverticulitis, Intestine Large (ICD9 562.11)     Fatty liver     GERD (Gastroesophageal Reflux Disease) (ICD9 530.81)     [place]'s disease     Hip Fracture (ICD9 820.8) left    Migraine Headache (ICD9 346.90)     Obese

## 2024-08-27 NOTE — PATIENT PROFILE ADULT - FALL HARM RISK - HARM RISK INTERVENTIONS

## 2024-08-27 NOTE — ED PROVIDER NOTE - NS ED MD TWO NIGHTS YN
Eloy King  [address] [facility] [address]    [birth year]     To Whom It May Concern:     This is to certify Eloy King was evaluated on 5/31/21     Patient can discontinue self-quarantine 10 days after symptom onset and with resolution of fever for at least 24 hours at the end of the 10 days (without the use of fever-reducing medications) and with improvement of other symptoms.    After a positive diagnosis, repeat testing might not be indicated for a period of up to 90 days. If a person recovers and then again develops symptoms that may be COVID-19, they should contact their provider and follow CDC protocols including self-quarantine as described above.    Many employers are asking that employees be seen and reexamined in order to return. We ask that you follow the Public Health guidelines above, please note that those guidelines do not advise a COVID-19 test to allow an employee to return to work when off due to symptoms that may or may not be related to COVID-19.    Close household contacts of any person with a positive COVID-19 diagnostic test should also quarantine per CDC recommendations.     COVID19 is a rapidly evolving situation and recommendations are changing regularly to prevent spread of the disease.     Thank you for your understanding during these unusual times.     [facility] [address] [address]  Phone: [phone]  Fax: [phone]     Yes

## 2024-08-27 NOTE — H&P ADULT - NSHPLABSRESULTS_GEN_ALL_CORE
< from: CT Chest No Cont (08.27.24 @ 16:47) >    IMPRESSION:  Appearance suggesting pulmonary fibrosis.  No focal consolidation      < end of copied text >    < from: Xray Chest 1 View- PORTABLE-Urgent (08.27.24 @ 13:02) >    MPRESSION: Prior density associated with left lower rib fractures has   improved.    < end of copied text >

## 2024-08-27 NOTE — ED PROVIDER NOTE - CARE PLAN
Principal Discharge DX:	Pneumonia   1 Principal Discharge DX:	Pneumonia  Secondary Diagnosis:	Weakness   Principal Discharge DX:	Shortness of breath  Goal:	DDX: Possible pneumonia LLL  Secondary Diagnosis:	Weakness

## 2024-08-27 NOTE — H&P ADULT - SOCIAL HISTORY
HISTORY OF PRESENT ILLNESS:  Ana Wharton is a 41 y.o. female who presents to the clinic today for Follow-up (Overall health concerns)    Last seen by me 5/2021.    Hypertension  Today's reading is normal at 128/82.  Home readings: 110s-120s/60s-80s.  She is doing intermittent fasting, avoiding all sweets.  The last time she took lisinopril-HCTZ was one day in December.  Has not been taking consistently.  Weight today is down to 214 from 234 lb in May 2021.  She is trying to reduce her salt intake.  No exercising consistently yet but planning to.    H/o hyperthyroidism  Thought to be due to Hashimoto's.  No longer on methimazole with recent normal thyroid tests.  Seen by milly 6/2021.      PAST MEDICAL HISTORY:  Past Medical History:   Diagnosis Date    Hypertension     Miscarriage 2018    Thyroid disease     hyperthyroidism        PAST SURGICAL HISTORY:  Past Surgical History:   Procedure Laterality Date    CHOLECYSTECTOMY  2014    ECTOPIC PREGNANCY SURGERY  2018    GALLBLADDER SURGERY         SOCIAL HISTORY:  Social History     Socioeconomic History    Marital status:    Occupational History    Occupation: [employer]   Tobacco Use    Smoking status: Never Smoker    Smokeless tobacco: Never Used   Substance and Sexual Activity    Alcohol use: No     Comment: socially    Drug use: No    Sexual activity: Yes     Partners: Male     Birth control/protection: None     Social Determinants of Health     Financial Resource Strain: Low Risk     Difficulty of Paying Living Expenses: Not hard at all   Food Insecurity: No Food Insecurity    Worried About Running Out of Food in the Last Year: Never true    Ran Out of Food in the Last Year: Never true   Transportation Needs: No Transportation Needs    Lack of Transportation (Medical): No    Lack of Transportation (Non-Medical): No   Physical Activity: Unknown    Days of Exercise per Week: 7 days   Stress: Stress Concern Present    Feeling of Stress :  To some extent   Social Connections: Unknown    Frequency of Communication with Friends and Family: More than three times a week    Frequency of Social Gatherings with Friends and Family: More than three times a week    Active Member of Clubs or Organizations: No    Attends Club or Organization Meetings: Never    Marital Status:        FAMILY HISTORY:  Family History   Problem Relation Age of Onset    Heart disease Mother 47    Diabetes Mother     Kidney disease Mother         on dialysis    Sickle cell anemia Mother     COPD Father     Heart failure Father     Breast cancer Sister 49    Hypertension Sister     Diabetes Brother     Lung cancer Maternal Grandmother         smoker    Sickle cell anemia Maternal Grandfather     Heart failure Paternal Grandfather     Diabetes Brother     Diabetes Brother     Hypertension Brother     Hashimoto's thyroiditis Sister     Scoliosis Sister        ALLERGIES AND MEDICATIONS: updated and reviewed.  Review of patient's allergies indicates:   Allergen Reactions    Dilaudid [hydromorphone (bulk)] Hives    Penicillins      hives     Medication List with Changes/Refills   Current Medications    LISINOPRIL-HYDROCHLOROTHIAZIDE (PRINZIDE,ZESTORETIC) 20-12.5 MG PER TABLET    Take 2 tablets by mouth once daily.    TERBINAFINE HCL (LAMISIL) 250 MG TABLET    Take 1 tablet (250 mg total) by mouth once daily.    TOPIRAMATE (TOPAMAX) 25 MG TABLET    Take 1 tablet (25 mg total) by mouth 2 (two) times daily.    TOPIRAMATE (TOPAMAX) 25 MG TABLET    Take 1 tablet (25 mg total) by mouth 2 (two) times a day.          CARE TEAM:  Patient Care Team:  Chadwick Oakley MD as PCP - General (Internal Medicine)         REVIEW OF SYSTEMS:  Review of Systems   Constitutional: Negative for chills and fever.   HENT: Negative for congestion and postnasal drip.    Eyes: Negative for photophobia and visual disturbance.   Respiratory: Negative for cough and shortness of breath.     Cardiovascular: Negative for chest pain and palpitations.   Gastrointestinal: Negative for abdominal pain, nausea and vomiting.   Genitourinary: Negative for dysuria and frequency.   Musculoskeletal: Negative for arthralgias and back pain.   Neurological: Negative for light-headedness and headaches.   Psychiatric/Behavioral: Negative for dysphoric mood. The patient is not nervous/anxious.          PHYSICAL EXAM:   Vitals:    02/01/22 1406   BP: 128/82   Pulse: 82   Resp: 19             Body mass index is 37.4 kg/m².     General appearance - alert, well appearing, and in no distress and oriented to person, place, and time  Mental status - normal mood, behavior, speech, dress, motor activity, and thought processes  Eyes - sclera anicteric, left eye normal, right eye normal  Chest - no tachypnea, retractions or cyanosis  Neurological - alert, oriented, normal speech, no focal findings or movement disorder noted, motor and sensory grossly normal bilaterally  Musculoskeletal - no joint tenderness, deformity or swelling  Extremities - peripheral pulses normal, no pedal edema, no clubbing or cyanosis      ASSESSMENT AND PLAN:  Essential hypertension        - At this time BP appears controlled without BP medication after weight loss and lifestyle measures.  Return for nurse visit and bring log.  Close follow up 1 month to ensure it stays at goal <130/80.      Hyperthyroidism  -     TSH; Future; Expected date: 10/01/2022  -     T4, Free; Future; Expected date: 10/01/2022  -     Thyroid Peroxidase Antibody; Future; Expected date: 10/01/2022    Class 3 severe obesity with body mass index (BMI) of 40.0 to 44.9 in adult, unspecified obesity type, unspecified whether serious comorbidity present        - Continued healthy eating and regular exercise encouraged.    Anemia, unspecified type  -     CBC Auto Differential; Future; Expected date: 10/01/2022  -     Ferritin; Future; Expected date: 10/01/2022  -     Iron and TIBC;  Future; Expected date: 10/01/2022    Healthcare maintenance  -     Mammo Digital Screening Bilat; Future; Expected date: 02/23/2022  -     Hemoglobin A1C; Future; Expected date: 10/01/2022  -     Comprehensive Metabolic Panel; Future; Expected date: 10/01/2022  -     Lipid Panel; Future; Expected date: 10/01/2022  -     CBC Auto Differential; Future; Expected date: 10/01/2022  -     TSH; Future; Expected date: 10/01/2022  -     Ferritin; Future; Expected date: 10/01/2022  -     Iron and TIBC; Future; Expected date: 10/01/2022  -     T4, Free; Future; Expected date: 10/01/2022  -     Thyroid Peroxidase Antibody; Future; Expected date: 10/01/2022    Encounter for screening for malignant neoplasm of breast, unspecified screening modality  -     Mammo Digital Screening Bilat; Future; Expected date: 02/23/2022             Follow up 1 month or sooner as needed.     No

## 2024-08-27 NOTE — H&P ADULT - NSHPREVIEWOFSYSTEMS_GEN_ALL_CORE
Reported dizziness, Denies headaches, blurred vision, diplopia, fall.  Denies fevers, chills, rhinitis, sore throat, cough, diarrhea, rash.   Reported shortness of breath Denies Chest pain, PND, Orthopnea, Palpitations, Syncope.   Denies Abdominal pain, flank pain, constipation, dysuria, changes in urinary habits.  Denies smoking, illicit drug use, alcohol addiction.   Denies numbness and tingling in extremities, denies swelling in legs.   Denies hematemesis, hematochezia, melena, hematuria.

## 2024-08-27 NOTE — ED ADULT NURSE NOTE - NS ED NURSE REPORT GIVEN DT
"Pt reports left leg very painful at time of call. Pt has ongoing lymphedema in the left leg but has not had pain \"for past couple of weeks\". Pt rates pain \"10\" for past \"couple of hours\". Pt reports pain is severe even with sitting and elevating for past hour. Pt denies fever. Pt reports he does not take anything for pain except pot and he is unable to do that at work. Pt is wondering if he should call his boss and leave work.     Writer advised pt ER visit recommended per nursing judgement if pain is severe after elevating/resting. Writer realized not all protocol for leg swelling completed after hanging up with pt (initially thought protocol would be no guideline then realized leg swelling did apply to pt situation). Writer called pt back after advising pt to go to ER if pain is severe due to realizing not all triage questions had been asked. Upon call back pt declines second level triage and states he is at work and \"with a customer\"    Pt declines offer of second level triage and no further questions or concerns.     Reason for Disposition   Patient sounds very sick or weak to the triager    Additional Information   Negative: SEVERE difficulty breathing (e.g., struggling for each breath, speaks in single words)   Negative: Looks like a broken bone or dislocated joint (e.g., crooked or deformed)   Negative: Sounds like a life-threatening emergency to the triager   Negative: Chest pain   Negative: Followed a leg injury   Negative: [1] Followed an insect bite AND [2] localized swelling (e.g., small area of puffy or swollen skin)   Negative: Swelling of one ankle joint   Negative: Swelling of knee is main symptom   Negative: Pregnant   Negative: Postpartum (from 0 to 6 weeks after delivery)   Negative: [1] Heart failure suspected (e.g., ankle swelling, shortness of breath, weight gain) AND [2] recently in hospital for heart failure   Negative: Difficulty breathing at rest   Negative: Entire foot is cool or blue in " comparison to other side     Pt is   Negative: [1] Can't walk or can barely walk AND [2] new-onset    Protocols used: Leg Swelling and Edema-A-AH     27-Aug-2024 17:33

## 2024-08-27 NOTE — ED PROVIDER NOTE - PROGRESS NOTE DETAILS
CXR suspicious for LLL infiltrate. Will get CT chest to confirm. Patient is significantly weak on his feet, unable to ambulate. Spoke with dr. Mendez, will admit patient. ~José Miguel Lofton PA-C PA: Patient seen and evaluated. Will r/o sepsis. ~José Miguel Lofton PA-C

## 2024-08-27 NOTE — ED PROVIDER NOTE - OBJECTIVE STATEMENT
· HPI Objective Statement: 74 y/o M with PMH HTN, HLD, CVA w/ left sided weakness , BPH s/p TURP yesterday w/ dr. díaz p/w increased bladder pain and hematuria w/ intermittent decreased lieberman output. Pt states he had a TURP w/ Dr. díaz and had a lieberman placed and upsized. Since going home he has been having increased blood in the urine w/ intermittent clots w/ trouble passing urine. He states he currently does not have abdominal pain, fever, chills, chest pain . he is not currently taking his plavix · HPI Objective Statement: 74 y/o M with PMH HTN, HLD, CVA w/ left sided weakness , BPH s/p TURP. PA: Patient is a 74 y/o M with PMHx of HTN, HLD, CVA w/ left sided weakness , BPH s/p TURP, GERD, Iker's disease, migraine HA's, who presents to [facility] c/o extreme general weakness, subjective fever and chills. Patient reports dizziness and near-syncope feeling when trying to ambulate. Patient was seen by his PMD who sent him to ED for further evaluation. ~José Miguel Lofton PA-C

## 2024-08-27 NOTE — ED PROVIDER NOTE - PHYSICAL EXAMINATION
PA NOTE: GEN: AOX3, NAD. HEENT: Throat clear. Airway is patent. EYES: PERRLA. EOMI. Head: NC/AT. NECK: Supple, No JVD. FROM. C-spine non-tender. CV:S1S2, RRR, LUNGS: Non-labored breathing, no tachypnea. O2sat 96%. +Rales LLL. CHEST: Equal chest expansion and rise. No deformity. ABD: Soft, NT/ND, no rebound, no guarding. No CVAT. EXT: No e/c/c. 2+ distal pulses. SKIN: No rashes. NEURO: No focal deficits. CN II-XII intact. FROM. 5/5 motor and sensory. ~José Miguel Lofton PA-C

## 2024-08-27 NOTE — ED ADULT NURSE NOTE - NSFALLHARMRISKINTERV_ED_ALL_ED

## 2024-08-27 NOTE — PATIENT PROFILE ADULT - FALL HARM RISK - PATIENT NEEDS ASSISTANCE
Standing/Walking/Toileting/Moving from bed to chair
[FreeTextEntry1] : VISION SCREENING\par SAFETY / HEALTHY HABITS REVIEWED\par HEALTHY DIET AND LIFESTYLE MODIFICATIONS\par CHECK ROUTINE LAB WORK\par VACCINATIONS DISCUSSED: COVID, PNEUMONIA AND SHINGRIX\par FOLLOW-UP ALL SPECIALISTS AS DIRECTED \par NEED MAMMOGRAM AND COLONOSCOPY REPORTS\par \par TICK AVOIDANCE AND PROTECTION DISCUSSED AND REVIEWED\par WILL CHECK LYME TEST NOW\par ALSO RECOMMEND REPEATING IN A FEW WEEKS TO CONFIRM - ORDERED\par CALL WITH ANY QUESTIONS, CONCERNS OR CHANGES

## 2024-08-27 NOTE — ED ADULT NURSE NOTE - OBJECTIVE STATEMENT
Pt is a 79 y/o male, alert and oriented x3, presenting  to ED with weakness. Pt reports, "I went to my cardiologist, Dr. Mendieta, today and was told I had low blood pressure and a fast heart rate. I've felt dizzy, weak, and fatigue for the past 2-3 weeks." Pt denies chest pain, SOB, nausea, vomiting, abdominal pain, fevers, recent travel. Pt reports taking Plavix due to h/o stroke in 2008 (pt has left upper/lower extremity deficits.) Pt reports falling due to dizziness 1 week ago.  20G IV placed in RIGHT AC and labs drawn and sent.

## 2024-08-27 NOTE — H&P ADULT - ASSESSMENT
- Progressive generalized weakness Associated with dizziness, Etiology to be determined, Possible orthostatic hypotension, PT evaluation, fall precautions, Doubt pneumonia.  CT chest with no evidence of consolidation or infiltrates, Monitor orthostatic vitals, start IV fluid replacement  - Dyspnea on exertion, CT chest with evidence of pulmonary fibrosis that need further evaluation. Will get pulmonary consultation. Follow-up echocardiogram to evaluate for possible pulmonary hypertension.  - History of CVA with left hemiparesis, continue Plavix and statin  - Possible UTI, sent for urine culture and continue ceftriaxone for now

## 2024-08-27 NOTE — H&P ADULT - HISTORY OF PRESENT ILLNESS
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 Stated he was at his primary care office Dr. Mendieta today and he was noted to have low blood pressure in the range of 80s on standing, So he was referred to the ER for further evaluation. He reported some shortness of breath on exertion. Patient reports dizziness feeling when trying to ambulate. Denied fever, chills, chest pain, palpitation, cough, nausea, vomiting or diarrhea.

## 2024-08-27 NOTE — PATIENT PROFILE ADULT - FUNCTIONAL SCREEN CURRENT LEVEL: SWALLOWING (IF SCORE 2 OR MORE FOR ANY ITEM, CONSULT REHAB SERVICES), MLM)
Senior Purposeful Rounding     Position:Back and Repositions Self     Physical Environment:Room free from clutter, Clear path to bathroom , Adequate lighting, and No safety hazards noted     Pain Rating/ Nonverbal Pain Behaviors:denies     Pain interventions Attempted: None     Patient Toileted:Continent and Independent 0 = swallows foods/liquids without difficulty

## 2024-08-27 NOTE — H&P ADULT - NSHPPHYSICALEXAM_GEN_ALL_CORE
T(C): 36.7 (08-27-24 @ 18:08), Max: 36.7 (08-27-24 @ 18:08)  HR: 76 (08-27-24 @ 18:08) (76 - 94)  BP: 131/84 (08-27-24 @ 18:08) (119/75 - 131/84)  RR: 18 (08-27-24 @ 18:08) (18 - 18)  SpO2: 92% (08-27-24 @ 18:08) (92% - 96%)    General: non-toxic  HEENT: non-traumatic, perrla, eomi  Cardio: s1s2 regular rate and rhythm  Lungs: comfortable breathing, clear to auscultation  Abdomen: Soft, non-tender, non-distended  Neuro: AOx4, Left hemiparesis  Ext: Pulses +2

## 2024-08-27 NOTE — ED ADULT TRIAGE NOTE - CHIEF COMPLAINT QUOTE
Ambulance to ER with c/o weakness and fatigue x 2 weeks, Patient seen by Dr. Portillo sent to ER for further eval; glucose 104 in triage.

## 2024-08-28 ENCOUNTER — RESULT REVIEW (OUTPATIENT)
Age: 79
End: 2024-08-28

## 2024-08-28 LAB
ANION GAP SERPL CALC-SCNC: 2 MMOL/L — LOW (ref 5–17)
BASOPHILS # BLD AUTO: 0.05 K/UL — SIGNIFICANT CHANGE UP (ref 0–0.2)
BASOPHILS NFR BLD AUTO: 0.7 % — SIGNIFICANT CHANGE UP (ref 0–2)
BUN SERPL-MCNC: 18 MG/DL — SIGNIFICANT CHANGE UP (ref 7–23)
CALCIUM SERPL-MCNC: 9.4 MG/DL — SIGNIFICANT CHANGE UP (ref 8.5–10.1)
CHLORIDE SERPL-SCNC: 110 MMOL/L — HIGH (ref 96–108)
CO2 SERPL-SCNC: 26 MMOL/L — SIGNIFICANT CHANGE UP (ref 22–31)
CREAT SERPL-MCNC: 0.74 MG/DL — SIGNIFICANT CHANGE UP (ref 0.5–1.3)
EGFR: 93 ML/MIN/1.73M2 — SIGNIFICANT CHANGE UP
EOSINOPHIL # BLD AUTO: 0.26 K/UL — SIGNIFICANT CHANGE UP (ref 0–0.5)
EOSINOPHIL NFR BLD AUTO: 3.8 % — SIGNIFICANT CHANGE UP (ref 0–6)
GLUCOSE SERPL-MCNC: 100 MG/DL — HIGH (ref 70–99)
HCT VFR BLD CALC: 44.4 % — SIGNIFICANT CHANGE UP (ref 39–50)
HGB BLD-MCNC: 14.9 G/DL — SIGNIFICANT CHANGE UP (ref 13–17)
IMM GRANULOCYTES NFR BLD AUTO: 0.6 % — SIGNIFICANT CHANGE UP (ref 0–0.9)
LYMPHOCYTES # BLD AUTO: 1.45 K/UL — SIGNIFICANT CHANGE UP (ref 1–3.3)
LYMPHOCYTES # BLD AUTO: 21.2 % — SIGNIFICANT CHANGE UP (ref 13–44)
MCHC RBC-ENTMCNC: 31.4 PG — SIGNIFICANT CHANGE UP (ref 27–34)
MCHC RBC-ENTMCNC: 33.6 GM/DL — SIGNIFICANT CHANGE UP (ref 32–36)
MCV RBC AUTO: 93.7 FL — SIGNIFICANT CHANGE UP (ref 80–100)
MONOCYTES # BLD AUTO: 0.62 K/UL — SIGNIFICANT CHANGE UP (ref 0–0.9)
MONOCYTES NFR BLD AUTO: 9.1 % — SIGNIFICANT CHANGE UP (ref 2–14)
NEUTROPHILS # BLD AUTO: 4.42 K/UL — SIGNIFICANT CHANGE UP (ref 1.8–7.4)
NEUTROPHILS NFR BLD AUTO: 64.6 % — SIGNIFICANT CHANGE UP (ref 43–77)
PLATELET # BLD AUTO: 147 K/UL — LOW (ref 150–400)
POTASSIUM SERPL-MCNC: 4.2 MMOL/L — SIGNIFICANT CHANGE UP (ref 3.5–5.3)
POTASSIUM SERPL-SCNC: 4.2 MMOL/L — SIGNIFICANT CHANGE UP (ref 3.5–5.3)
RBC # BLD: 4.74 M/UL — SIGNIFICANT CHANGE UP (ref 4.2–5.8)
RBC # FLD: 12.9 % — SIGNIFICANT CHANGE UP (ref 10.3–14.5)
SODIUM SERPL-SCNC: 138 MMOL/L — SIGNIFICANT CHANGE UP (ref 135–145)
WBC # BLD: 6.84 K/UL — SIGNIFICANT CHANGE UP (ref 3.8–10.5)
WBC # FLD AUTO: 6.84 K/UL — SIGNIFICANT CHANGE UP (ref 3.8–10.5)

## 2024-08-28 PROCEDURE — 76376 3D RENDER W/INTRP POSTPROCES: CPT | Mod: 26

## 2024-08-28 PROCEDURE — 93306 TTE W/DOPPLER COMPLETE: CPT | Mod: 26

## 2024-08-28 PROCEDURE — 99232 SBSQ HOSP IP/OBS MODERATE 35: CPT

## 2024-08-28 PROCEDURE — 99233 SBSQ HOSP IP/OBS HIGH 50: CPT

## 2024-08-28 RX ADMIN — Medication 75 MILLIGRAM(S): at 11:34

## 2024-08-28 RX ADMIN — SODIUM CHLORIDE 75 MILLILITER(S): 9 INJECTION INTRAMUSCULAR; INTRAVENOUS; SUBCUTANEOUS at 14:25

## 2024-08-28 RX ADMIN — OXYBUTYNIN CHLORIDE 5 MILLIGRAM(S): 5 TABLET ORAL at 11:33

## 2024-08-28 RX ADMIN — Medication 3 MILLIGRAM(S): at 21:22

## 2024-08-28 RX ADMIN — PAROXETINE 20 MILLIGRAM(S): 10 TABLET, FILM COATED ORAL at 11:33

## 2024-08-28 RX ADMIN — OXYBUTYNIN CHLORIDE 5 MILLIGRAM(S): 5 TABLET ORAL at 21:22

## 2024-08-28 RX ADMIN — Medication 80 MILLIGRAM(S): at 21:23

## 2024-08-28 RX ADMIN — Medication 1000 MILLIGRAM(S): at 14:24

## 2024-08-28 RX ADMIN — Medication 1000 UNIT(S): at 11:33

## 2024-08-28 NOTE — PHYSICAL THERAPY INITIAL EVALUATION ADULT - GENERAL OBSERVATIONS, REHAB EVAL
The pt was pleasant and cooperative, received on 2N, supine, chronic left sided hemiplegia noted, Left UE flexion contracture noted.

## 2024-08-28 NOTE — PHYSICAL THERAPY INITIAL EVALUATION ADULT - LEVEL OF INDEPENDENCE: SCOOT/BRIDGE, REHAB EVAL
Rx Refill Note  Requested Prescriptions     Pending Prescriptions Disp Refills    hydrALAZINE (APRESOLINE) 25 MG tablet [Pharmacy Med Name: HYDRALAZINE  25MG TABLETS(ORANGE)] 180 tablet 1     Sig: TAKE 1 TABLET BY MOUTH TWICE DAILY    ramipril (ALTACE) 10 MG capsule [Pharmacy Med Name: RAMIPRIL 10MG CAPSULES] 180 capsule 1     Sig: TAKE 1 CAPSULE BY MOUTH TWICE DAILY    spironolactone (ALDACTONE) 50 MG tablet [Pharmacy Med Name: SPIRONOLACTONE 50MG TABLETS] 90 tablet 1     Sig: TAKE 1 TABLET BY MOUTH DAILY    atorvastatin (LIPITOR) 10 MG tablet [Pharmacy Med Name: ATORVASTATIN 10MG TABLETS] 90 tablet 1     Sig: TAKE 1 TABLET BY MOUTH DAILY      Last office visit with prescribing clinician: 2/27/2023   Last telemedicine visit with prescribing clinician: Visit date not found   Next office visit with prescribing clinician: 8/28/2023                         Would you like a call back once the refill request has been completed: [] Yes [] No    If the office needs to give you a call back, can they leave a voicemail: [] Yes [] No    Polly Vyas  08/16/23, 09:58 EDT   minimum assist (75% patients effort)

## 2024-08-28 NOTE — PHYSICAL THERAPY INITIAL EVALUATION ADULT - MODALITIES TREATMENT COMMENTS
Orthostatic BP checks conducted with Nursing assistant, pt was negative for orthostatic changes, BP remained stable from Supine, sitting to Standing at present. The pt demonstrated good overall activity tolerance, responding well to therex review, transfer and ambulation tx. The pt was left sitting OOB and in a chair with chair alarm secured, RN aware. CB, tray and phone in place. The pt was in NAD at end of tx.

## 2024-08-28 NOTE — CONSULT NOTE ADULT - ASSESSMENT
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 Patient has chronic exertional dyspnea. A CT scan of the chest was done that showed basal fibrotic changes with some traction bronchiectasis without honeycombing. 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 Continues to have weakness.      Problems.  Abnormal CT scan of the chest with findings of minimal pulmonary fibrosis/ Traction bronchiectasis  Patient has exertional dyspnea but no acute respiratory symptoms.    Generalized weakness,  prior history of stroke    Recommendations:  These CT scan findings on the chest scan have Slightly progressed Since 2021  I would recommend an echocardiography to make sure patient does not have pulmonary hypertension.  Workup of interstitial lung disease - will send inflammatory markers-further evaluation can be done as an outpatient pulmonary with us    Thank you for the consult.  Pulmonary will sign off please call if needed.  Information was given to patient and his wife for following up with us

## 2024-08-28 NOTE — CONSULT NOTE ADULT - SUBJECTIVE AND OBJECTIVE BOX
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   Patient went to cardiology office where he was found to be orthostatic with low blood pressure.  Patient has recent history of dizziness and generalized weakness.  He denies prior history of lung disease like asthma COPD interstitial lung disease.  He is not on inhaled medications.  Denies sleep apnea.  No recent history of pulmonary embolism no recent long distance travel.  Pulmonary was consulted for abnormal CT scan of the chest  History was taken from patient, medical records, medical staff And family    REVIEW OF SYSTEMS:  Constitutional: No fevers or chills. No weight loss.   Eyes: No itching, red eyes  or discharge from the eyes  ENT:  No ear discharge. No nasal congestion. No post nasal drip, sore throat or oral trhush.   CV: No chest pain. No palpitations. No lightheadedness or dizziness, no recent cardiac procedure  Resp: per HPI  GI: No nausea. No vomiting. No diarrhea.  MSK: No joint pain or pain in any extremities  Integumentary: No skin lesions.   Neurological: Generalized weakness left-sided weakness worse than right. No sensory changes.  Rest of review of symptoms were unremarkable    PAST MEDICAL & SURGICAL HISTORY:  CVA (Cerebral Infarction) (ICD9 434.91)  2008  GERD (Gastroesophageal Reflux Disease) (ICD9 530.81)  Migraine Headache (ICD9 346.90)  Diverticulitis, Intestine Large (ICD9 562.11)  Hip Fracture (ICD9 820.8)  left  Iker's disease  Fatty liver  Anxiety and depression  Obese  S/P Colon Resection (ICD9 V45.89)  2003  History of prostate surgery  "rezum procedure"-2020    History of repair of left hip joint  2009  History of cholecystectomy  2016    FAMILY HISTORY:  SOCIAL HISTORY:  Smoking: Per HPI  Exposures: denies  Recent Travel: denies  Allergies  heparin (Unknown)  Intolerances    OBJECTIVE:  ICU Vital Signs Last 24 Hrs  T(C): 36.8 (28 Aug 2024 16:00), Max: 36.9 (28 Aug 2024 08:00)  T(F): 98.2 (28 Aug 2024 16:00), Max: 98.4 (28 Aug 2024 08:00)  HR: 93 (28 Aug 2024 16:00) (80 - 93)  BP: 113/59 (28 Aug 2024 16:00) (110/65 - 128/72)  RR: 16 (28 Aug 2024 16:00) (16 - 18)  SpO2: 93% (28 Aug 2024 16:00) (93% - 95%)  O2 Parameters below as of 28 Aug 2024 16:00  Patient On (Oxygen Delivery Method): room air    08-28-24 @ 07:01  -  08-28-24 @ 20:24  --------------------------------------------------------  IN: 670 mL / OUT: 520 mL / NET: 150 mL      PHYSICAL EXAM:  General: Awake, alert, oriented, not in respiratory distress.   HEENT: Atraumatic, normocephalic, Neck: No JVD. Trachea midline  Respiratory: Normal chest expansion, equal breath soudns, no rales, rhonchi, wheezes appreciated  Cardiovascular: S1 S2 normal. No murmurs, rubs or gallops appreciated  Abdomen: Soft, non-tender,    CNS awake alert left-sided weakness of the hand with contracture left leg weakness  Extremities: Warm to touch.  No pedal edema.     LABS:                        14.9   6.84  )-----------( 147      ( 28 Aug 2024 09:33 )             44.4     08-28    138  |  110<H>  |  18  ----------------------------<  100<H>  4.2   |  26  |  0.74    Ca    9.4      28 Aug 2024 09:33    TPro  6.8  /  Alb  3.3  /  TBili  0.4  /  DBili  x   /  AST  36  /  ALT  40  /  AlkPhos  65  08-27  Venous Blood Gas:  08-27 @ 13:29  7.35/54/42/30/76  VBG Lactate: --    acetaminophen     Tablet .. 650 milliGRAM(s) Oral every 6 hours PRN  aluminum hydroxide/magnesium hydroxide/simethicone Suspension 30 milliLiter(s) Oral every 4 hours PRN  atorvastatin 80 milliGRAM(s) Oral at bedtime  cefTRIAXone Injectable. 1000 milliGRAM(s) IV Push every 24 hours  cholecalciferol 1000 Unit(s) Oral daily  clopidogrel Tablet 75 milliGRAM(s) Oral daily  melatonin 3 milliGRAM(s) Oral at bedtime PRN  ondansetron Injectable 4 milliGRAM(s) IV Push every 8 hours PRN  oxybutynin 5 milliGRAM(s) Oral two times a day  PARoxetine 20 milliGRAM(s) Oral daily  sodium chloride 0.9%. 1000 milliLiter(s) IV Continuous <Continuous>    < from: CT Chest No Cont (08.27.24 @ 16:47) >  Patent central airways. Peripheral reticular   opacities predominantly involving the lung bases progressive since   01/15/2021 consistent with pulmonary fibrosis. Lower lobe traction   bronchiectasis. No focal consolidation.  PLEURA: No pleural effusion.  VESSELS: Within normal limits.  HEART: Heart size is normal. No pericardial effusion. Coronary artery   calcification.  MEDIASTINUM AND MAURICIO: No lymphadenopathy.  CHEST WALL AND LOWER NECK: Within normal limits.  VISUALIZED UPPER ABDOMEN: Partially visualized right renal cyst.   Cholecystectomy.  BONES: Degenerative change. Kyphosis.    IMPRESSION:  Appearance suggesting pulmonary fibrosis.  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 Patient has chronic exertional dyspnea. A CT scan of the chest was done that showed basal fibrotic changes with some traction bronchiectasis without honeycombing. 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 Continues to have weakness.      Problems.  Abnormal CT scan of the chest with findings of minimal pulmonary fibrosis.  Patient has exertional dyspnea but no acute respiratory symptoms.    Generalized weakness prior history of stroke    Recommendations:  These CT scan findings on the chest scan have progressed.  I would recommend an echocardiography to make sure patient does not have pulmonary hypertension.  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   Patient went to cardiology office where he was found to be orthostatic with low blood pressure.  Patient has recent history of dizziness and generalized weakness.  He denies prior history of lung disease like asthma COPD interstitial lung disease.  He is not on inhaled medications.  Denies sleep apnea.  No recent history of pulmonary embolism no recent long distance travel.  Pulmonary was consulted for abnormal CT scan of the chest  History was taken from patient, medical records, medical staff And family    REVIEW OF SYSTEMS:  Constitutional: No fevers or chills. No weight loss.   Eyes: No itching, red eyes  or discharge from the eyes  ENT:  No ear discharge. No nasal congestion. No post nasal drip, sore throat or oral trhush.   CV: No chest pain. No palpitations. No lightheadedness or dizziness, no recent cardiac procedure  Resp: per HPI  GI: No nausea. No vomiting. No diarrhea.  MSK: No joint pain or pain in any extremities  Integumentary: No skin lesions.   Neurological: Generalized weakness left-sided weakness worse than right. No sensory changes.  Rest of review of symptoms were unremarkable    PAST MEDICAL & SURGICAL HISTORY:  CVA (Cerebral Infarction) (ICD9 434.91)  2008  GERD (Gastroesophageal Reflux Disease) (ICD9 530.81)  Migraine Headache (ICD9 346.90)  Diverticulitis, Intestine Large (ICD9 562.11)  Hip Fracture (ICD9 820.8)  left  Iker's disease  Fatty liver  Anxiety and depression  Obese  S/P Colon Resection (ICD9 V45.89)  2003  History of prostate surgery  "rezum procedure"-2020    History of repair of left hip joint  2009  History of cholecystectomy  2016    FAMILY HISTORY:  SOCIAL HISTORY:  Smoking: Per HPI  Exposures: denies  Recent Travel: denies  Allergies  heparin (Unknown)  Intolerances    OBJECTIVE:  ICU Vital Signs Last 24 Hrs  T(C): 36.8 (28 Aug 2024 16:00), Max: 36.9 (28 Aug 2024 08:00)  T(F): 98.2 (28 Aug 2024 16:00), Max: 98.4 (28 Aug 2024 08:00)  HR: 93 (28 Aug 2024 16:00) (80 - 93)  BP: 113/59 (28 Aug 2024 16:00) (110/65 - 128/72)  RR: 16 (28 Aug 2024 16:00) (16 - 18)  SpO2: 93% (28 Aug 2024 16:00) (93% - 95%)  O2 Parameters below as of 28 Aug 2024 16:00  Patient On (Oxygen Delivery Method): room air    08-28-24 @ 07:01  -  08-28-24 @ 20:24  --------------------------------------------------------  IN: 670 mL / OUT: 520 mL / NET: 150 mL      PHYSICAL EXAM:  General: Awake, alert, oriented, not in respiratory distress.   HEENT: Atraumatic, normocephalic, Neck: No JVD. Trachea midline  Respiratory: Normal chest expansion, equal breath soudns, no rales, rhonchi, wheezes appreciated  Cardiovascular: S1 S2 normal. No murmurs, rubs or gallops appreciated  Abdomen: Soft, non-tender,    CNS awake alert left-sided weakness of the hand with contracture left leg weakness  Extremities: Warm to touch.  No pedal edema.     LABS:                        14.9   6.84  )-----------( 147      ( 28 Aug 2024 09:33 )             44.4     08-28    138  |  110<H>  |  18  ----------------------------<  100<H>  4.2   |  26  |  0.74    Ca    9.4      28 Aug 2024 09:33    TPro  6.8  /  Alb  3.3  /  TBili  0.4  /  DBili  x   /  AST  36  /  ALT  40  /  AlkPhos  65  08-27  Venous Blood Gas:  08-27 @ 13:29  7.35/54/42/30/76  VBG Lactate: --    acetaminophen     Tablet .. 650 milliGRAM(s) Oral every 6 hours PRN  aluminum hydroxide/magnesium hydroxide/simethicone Suspension 30 milliLiter(s) Oral every 4 hours PRN  atorvastatin 80 milliGRAM(s) Oral at bedtime  cefTRIAXone Injectable. 1000 milliGRAM(s) IV Push every 24 hours  cholecalciferol 1000 Unit(s) Oral daily  clopidogrel Tablet 75 milliGRAM(s) Oral daily  melatonin 3 milliGRAM(s) Oral at bedtime PRN  ondansetron Injectable 4 milliGRAM(s) IV Push every 8 hours PRN  oxybutynin 5 milliGRAM(s) Oral two times a day  PARoxetine 20 milliGRAM(s) Oral daily  sodium chloride 0.9%. 1000 milliLiter(s) IV Continuous <Continuous>    < from: CT Chest No Cont (08.27.24 @ 16:47) >  Patent central airways. Peripheral reticular   opacities predominantly involving the lung bases progressive since   01/15/2021 consistent with pulmonary fibrosis. Lower lobe traction   bronchiectasis. No focal consolidation.  PLEURA: No pleural effusion.  VESSELS: Within normal limits.  HEART: Heart size is normal. No pericardial effusion. Coronary artery   calcification.  MEDIASTINUM AND MAURICIO: No lymphadenopathy.  CHEST WALL AND LOWER NECK: Within normal limits.  VISUALIZED UPPER ABDOMEN: Partially visualized right renal cyst.   Cholecystectomy.  BONES: Degenerative change. Kyphosis.    IMPRESSION:  Appearance suggesting pulmonary fibrosis.  No focal consolidation

## 2024-08-28 NOTE — PHYSICAL THERAPY INITIAL EVALUATION ADULT - NSPTDISCHREC_GEN_A_CORE
Pt reports already having Home OT and Outpatient PT services regularly on a weekly basis prior to admission./Home PT/Outpatient PT

## 2024-08-28 NOTE — PROGRESS NOTE ADULT - SUBJECTIVE AND OBJECTIVE BOX
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 Stated he was at his primary care office Dr. Mendieta today and he was noted to have low blood pressure in the range of 80s on standing, So he was referred to the ER for further evaluation. He reported some shortness of breath on exertion. Patient reports dizziness feeling when trying to ambulate. Denied fever, chills, chest pain, palpitation, cough, nausea, vomiting or diarrhea. (27 Aug 2024 19:29)      Subj: pt seen at bedside, awake, alert, pleasant, no further dizziness, PT in to see pt and no orthostasis noted, tanvi po, denies any cp or sob        Review of system-  all 10 systems reviewed and are negative with pertinent positives documented in HPI      PHYSICAL EXAM:    GENERAL: Comfortable, no acute distress   HEAD:  Normocephalic, atraumatic  EYES: EOMI, PERRLA  HEENT: Moist mucous membranes  NECK: Supple, No JVD  NERVOUS SYSTEM:  Alert & Oriented X3, Motor Strength R>L, with LUE/LLE weakness 2/2 h/o CVA  CHEST/LUNG: Clear to auscultation bilaterally  HEART: Regular rate and rhythm  ABDOMEN: Soft, non tender, Nondistended, Bowel sounds present  GENITOURINARY: Voiding, no palpable bladder  EXTREMITIES:   No clubbing, cyanosis, or edema  MUSCULOSKELETAL- No muscle tenderness, no joint tenderness  SKIN-no rash          Today's labs:                        14.9   6.84  )-----------( 147      ( 28 Aug 2024 09:33 )             44.4       08-28    138  |  110<H>  |  18  ----------------------------<  100<H>  4.2   |  26  |  0.74    Ca    9.4      28 Aug 2024 09:33    TPro  6.8  /  Alb  3.3  /  TBili  0.4  /  DBili  x   /  AST  36  /  ALT  40  /  AlkPhos  65  08-27        RADIOLOGY & ADDITIONAL TESTS:      < from: CT Chest No Cont (08.27.24 @ 16:47) >    ACC: [id number] EXAM:  CT CHEST   ORDERED BY: JUSTYN STEPHENS     PROCEDURE DATE:  08/27/2024          INTERPRETATION:  CLINICAL INFORMATION: 78-year-old man with dizziness and   near syncope, subjective fever and chills, generalized weakness. History   ofCVA. To assess for pneumonia.    COMPARISON: Chest x-ray 08/27/2024 CT 01/15/2021    CONTRAST/COMPLICATIONS:  IV Contrast: NONE  Oral Contrast: NONE  Complications: None reported at time of study completion    PROCEDURE:  CT of the Chest was performed.  Sagittal and coronal reformats were performed.    FINDINGS:    LUNGS AND LARGE AIRWAYS: Patent central airways. Peripheral reticular   opacities predominantly involving the lung bases progressive since   01/15/2021 consistent with pulmonary fibrosis. Lower lobe traction   bronchiectasis. No focal consolidation.  PLEURA: No pleural effusion.  VESSELS: Within normal limits.  HEART: Heart size is normal. No pericardial effusion. Coronary artery   calcification.  MEDIASTINUM AND MAURICIO: No lymphadenopathy.  CHEST WALL AND LOWER NECK: Within normal limits.  VISUALIZED UPPER ABDOMEN: Partially visualized right renal cyst.   Cholecystectomy.  BONES: Degenerative change. Kyphosis.    IMPRESSION:  Appearance suggesting pulmonary fibrosis.  No focal consolidation    < from: Xray Chest 1 View- PORTABLE-Urgent (08.27.24 @ 13:02) >    ACC: [id number] EXAM:  XR CHEST PORTABLE URGENT 1V   ORDERED BY: GALEN BARR     PROCEDURE DATE:  08/27/2024        INTERPRETATION:  AP chest on August 27, 2024 at 12:52 PM. Patient has   chest pain.    Heart size normal.    On May 20, 2021 there is a slight density associated with left posterior   lateral rib fractures which has improved on this study. No acute finding.    IMPRESSION: Prior density associated with left lower rib fractures has   improved.        MEDICATIONS  (STANDING):  atorvastatin 80 milliGRAM(s) Oral at bedtime  cefTRIAXone Injectable. 1000 milliGRAM(s) IV Push every 24 hours  cholecalciferol 1000 Unit(s) Oral daily  clopidogrel Tablet 75 milliGRAM(s) Oral daily  oxybutynin 5 milliGRAM(s) Oral two times a day  PARoxetine 20 milliGRAM(s) Oral daily  sodium chloride 0.9%. 1000 milliLiter(s) (75 mL/Hr) IV Continuous <Continuous>    MEDICATIONS  (PRN):  acetaminophen     Tablet .. 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 Stated he was at his primary care office Dr. Mendieta today and he was noted to have low blood pressure in the range of 80s on standing, So he was referred to the ER for further evaluation.     # orthostasis, dizziness  adm to medicine  most likely from dehydration, resolved with fluids  cont IVFs for now, encourage po fluid  PT evals    # SOB   not noted while lying in bed  satting well on Room air  crackles noted LLB  CT chest with evidence of pulmonary fibrosis   pt denies prior h/o pulm fibrosis  Pulm consulted  echocardiogram performed: results pending    # H/O CVA with left hemiparesis  cont plavix and statin    # + UTI with mild pyuria   will treat as pt was symptomatic    cont rocephin for now  await cultures    # VTE prophylaxis  pt is allergic to heparin  cont plavix  venodynes  PT eval

## 2024-08-28 NOTE — PHYSICAL THERAPY INITIAL EVALUATION ADULT - PERTINENT HX OF CURRENT PROBLEM, REHAB EVAL
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 Stated he was at his primary care office Dr. Mendieta today and he was noted to have low blood pressure in the range of 80s on standing, So he was referred to the ER for further evaluation.

## 2024-08-28 NOTE — PHYSICAL THERAPY INITIAL EVALUATION ADULT - ADDITIONAL COMMENTS
The pt reports that he uses a narrow based quad cane for short distances from chair to bed/ chair to chair etc. The pt reports having a powered scooter that he uses for negotiating larger distances/ environments.

## 2024-08-28 NOTE — PHYSICAL THERAPY INITIAL EVALUATION ADULT - TRANSFER SKILLS, REHAB EVAL
independent Mohs Histo Method Verbiage: Each section was then chromacoded and processed in the Mohs lab using the Mohs protocol and submitted for frozen section.

## 2024-08-29 ENCOUNTER — TRANSCRIPTION ENCOUNTER (OUTPATIENT)
Age: 79
End: 2024-08-29

## 2024-08-29 VITALS
TEMPERATURE: 98 F | HEART RATE: 74 BPM | RESPIRATION RATE: 17 BRPM | OXYGEN SATURATION: 95 % | DIASTOLIC BLOOD PRESSURE: 98 MMHG | SYSTOLIC BLOOD PRESSURE: 159 MMHG

## 2024-08-29 PROCEDURE — 99239 HOSP IP/OBS DSCHRG MGMT >30: CPT

## 2024-08-29 RX ORDER — PAROXETINE 10 MG/1
1 TABLET, FILM COATED ORAL
Qty: 0 | Refills: 0 | DISCHARGE
Start: 2024-08-29

## 2024-08-29 RX ORDER — CEFUROXIME SODIUM 1.5 G
1 VIAL (EA) INJECTION
Qty: 8 | Refills: 0
Start: 2024-08-29 | End: 2024-09-01

## 2024-08-29 RX ORDER — FESOTERODINE FUMARATE 4 MG/1
1 TABLET, FILM COATED, EXTENDED RELEASE ORAL
Refills: 0 | DISCHARGE

## 2024-08-29 RX ORDER — POVIDONE, PROPYLENE GLYCOL 6.8; 3 MG/ML; MG/ML
1 LIQUID OPHTHALMIC
Refills: 0 | DISCHARGE

## 2024-08-29 RX ORDER — PAROXETINE 10 MG/1
1 TABLET, FILM COATED ORAL
Refills: 0 | DISCHARGE

## 2024-08-29 RX ORDER — POTASSIUM CITRATE 10 MEQ/1
1 TABLET, EXTENDED RELEASE ORAL
Refills: 0 | DISCHARGE

## 2024-08-29 RX ORDER — FOLIC ACID/MULTIVIT,IRON,MINER 0.4MG-18MG
1 TABLET,CHEWABLE ORAL
Refills: 0 | DISCHARGE

## 2024-08-29 RX ORDER — CYANOCOBALAMIN (VITAMIN B-12) 500MCG/0.1
1 GEL (ML) NASAL
Refills: 0 | DISCHARGE

## 2024-08-29 RX ORDER — METHENAMINE MANDELATE 1 G
1 TABLET ORAL
Refills: 0 | DISCHARGE

## 2024-08-29 RX ORDER — SODIUM CHLORIDE 0.65 %
2 AEROSOL, SPRAY (ML) NASAL
Refills: 0 | DISCHARGE

## 2024-08-29 RX ORDER — ASCORBIC ACID/ASCORBATE SODIUM 500 MG
1 TABLET,CHEWABLE ORAL
Refills: 0 | DISCHARGE

## 2024-08-29 RX ORDER — OXYBUTYNIN CHLORIDE 5 MG/1
1 TABLET ORAL
Qty: 0 | Refills: 0 | DISCHARGE
Start: 2024-08-29

## 2024-08-29 RX ADMIN — PAROXETINE 20 MILLIGRAM(S): 10 TABLET, FILM COATED ORAL at 09:28

## 2024-08-29 RX ADMIN — Medication 1000 MILLIGRAM(S): at 13:11

## 2024-08-29 RX ADMIN — Medication 75 MILLIGRAM(S): at 09:28

## 2024-08-29 RX ADMIN — OXYBUTYNIN CHLORIDE 5 MILLIGRAM(S): 5 TABLET ORAL at 09:28

## 2024-08-29 RX ADMIN — Medication 1000 UNIT(S): at 09:29

## 2024-08-29 NOTE — DISCHARGE NOTE PROVIDER - CARE PROVIDER_API CALL
no pleasure in eating, loss of some taste.  has some abdominal discomfort today maybe constipation has not had BM in a few days   taking remeron with little effect Noble Portillo  Cardiovascular Disease  [address] [place] [address]  Phone: [phone]  Fax: [phone]  Established Patient  Follow Up Time: 2 weeks    Frank Sunshine  Pulmonary Disease  [address], [address]  [place] [address]  Phone: [phone]  Fax: [phone]  Follow Up Time: 1 month

## 2024-08-29 NOTE — DISCHARGE NOTE PROVIDER - NSDCMRMEDTOKEN_GEN_ALL_CORE_FT
ascorbic acid 500 mg oral tablet: 1 tab(s) orally once a day (at bedtime)  ascorbic acid 500 mg oral tablet: 1 tab(s) orally once a day (at bedtime)  atorvastatin 80 mg oral tablet: 1 tab(s) orally once a day (at bedtime)  atorvastatin 80 mg oral tablet: 1 tab(s) orally once a day (at bedtime)  Ayr Saline 0.65% nasal solution: 2 drop(s) intranasally 3 times a day as needed  Ayr Saline 0.65% nasal solution: 2 drop(s) intranasally 3 times a day as needed  cholecalciferol 25 mcg (1000 intl units) oral tablet: 1 tab(s) orally once a day  cholecalciferol 25 mcg (1000 intl units) oral tablet: 1 tab(s) orally once a day  clopidogrel 75 mg oral tablet: 1 tab(s) orally once a day (at bedtime)  clopidogrel 75 mg oral tablet: 1 tab(s) orally once a day (at bedtime)  cyanocobalamin 1000 mcg oral tablet: 1 tab(s) orally once a day  cyanocobalamin 1000 mcg oral tablet: 1 tab(s) orally once a day  Fesoterodine 4 mg oral tablet, extended release: 1 tab(s) orally once a day  Fesoterodine 4 mg oral tablet, extended release: 1 tab(s) orally once a day  Levomefolate 15mg: Take 1 tablet orally once daily  methenamine hippurate 1 g oral tablet: 1 tab(s) orally once a day (at bedtime)  methenamine hippurate 1 g oral tablet: 1 tab(s) orally once a day (at bedtime)  PARoxetine 20 mg oral tablet: 1 tab(s) orally once a day  PARoxetine 20 mg oral tablet: 1 tab(s) orally once a day  potassium citrate 10 mEq oral tablet, extended release: 1 tab(s) orally once a day (in the morning)  potassium citrate 10 mEq oral tablet, extended release: 1 tab(s) orally once a day (in the morning)  TheraTears ophthalmic solution: 1 drop(s) in each eye 3 times a day as needed for  dry eyes  TheraTears ophthalmic solution: 1 drop(s) in each eye 3 times a day as needed for  dry eyes  vitamin E 180 mg oral capsule: 1 cap(s) orally once a day  vitamin E 180 mg oral capsule: 1 cap(s) orally once a day   ascorbic acid 500 mg oral tablet: 1 tab(s) orally once a day (at bedtime)  atorvastatin 80 mg oral tablet: 1 tab(s) orally once a day (at bedtime)  Ayr Saline 0.65% nasal solution: 2 drop(s) intranasally 3 times a day as needed  cefuroxime 250 mg oral tablet: 1 tab(s) orally 2 times a day  cholecalciferol 25 mcg (1000 intl units) oral tablet: 1 tab(s) orally once a day  clopidogrel 75 mg oral tablet: 1 tab(s) orally once a day (at bedtime)  cyanocobalamin 1000 mcg oral tablet: 1 tab(s) orally once a day  Fesoterodine 4 mg oral tablet, extended release: 1 tab(s) orally once a day  Levomefolate 15mg: Take 1 tablet orally once daily  methenamine hippurate 1 g oral tablet: 1 tab(s) orally once a day (at bedtime)  oxyBUTYnin 5 mg oral tablet: 1 tab(s) orally 2 times a day  PARoxetine 20 mg oral tablet: 1 tab(s) orally once a day  potassium citrate 10 mEq oral tablet, extended release: 1 tab(s) orally once a day (in the morning)  TheraTears ophthalmic solution: 1 drop(s) in each eye 3 times a day as needed for  dry eyes  vitamin E 180 mg oral capsule: 1 cap(s) orally once a day

## 2024-08-29 NOTE — DISCHARGE NOTE PROVIDER - PROVIDER TOKENS
PROVIDER:[TOKEN:[[id number]],FOLLOWUP:[2 weeks],ESTABLISHEDPATIENT:[T]],PROVIDER:[TOKEN:[[id number]],FOLLOWUP:[1 month]]

## 2024-08-29 NOTE — DISCHARGE NOTE PROVIDER - NSDCCPCAREPLAN_GEN_ALL_CORE_FT
PRINCIPAL DISCHARGE DIAGNOSIS  Diagnosis: Orthostasis  Assessment and Plan of Treatment: this resolved with fluids and was likley from being dehydrated  increase your oral fluids and monitor the color of your urine  if its dark than you are not drinking enough.  Your urine should be light yellow to clear        SECONDARY DISCHARGE DIAGNOSES  Diagnosis: Urinary tract infection with pyuria  Assessment and Plan of Treatment: You have received antibiotics in your IV called ceftriaxone daily for  3 days.  I have sent a prescription to your pharmacy to continue oral antibiotics twice a day for 4 more days.  When I get the culture back I will call you if you need a different antibiotic.  if you dont hear from me then that oral antibiotic you were given was appropriate.  make sure you finsih all the tablets.

## 2024-08-29 NOTE — DISCHARGE NOTE NURSING/CASE MANAGEMENT/SOCIAL WORK - PATIENT PORTAL LINK FT
You can access the FollowMyHealth Patient Portal offered by [facility] by registering at the following website: [email or web address]. By joining [facility]’s FollowMyHealth portal, you will also be able to view your health information using other applications (apps) compatible with our system.

## 2024-08-29 NOTE — DISCHARGE NOTE PROVIDER - HOSPITAL COURSE
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  He states over the last couple of days he has noted some weakness, which he felt was getting worse and had difficulty ambulating.  He went to his PCP, Dr. Mendieta today and he was noted to have low blood pressure in the range of 80s on standing, So he was referred to the ER for further evaluation. He reported some shortness of breath on exertion. Patient reports dizziness feeling when trying to ambulate. Denied fever, chills, chest pain, palpitation, cough, nausea, vomiting or diarrhea.  In the ER his CBC was wnl, started on IVFs.  He was seen by PT, amb with quad cane and did well. Orthostatics were checked and have resolved with fluids,  and he denies any further dizziness or lightheadeness.  U/A was noted for mild pyuria, but since pt was symptomatic was placed on Rocephin.  His Ct chest was noted for patent central airways. Peripheral reticular opacities predominantly involving the lung bases progressive since 01/15/2021 consistent with pulmonary fibrosis. PT was not aware of this diagnoses. 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  He states over the last couple of days he has noted some weakness, which he felt was getting worse and had difficulty ambulating.  He went to his PCP, Dr. Mendieta today and he was noted to have low blood pressure in the range of 80s on standing, So he was referred to the ER for further evaluation. He reported some shortness of breath on exertion. Patient reports dizziness feeling when trying to ambulate. Denied fever, chills, chest pain, palpitation, cough, nausea, vomiting or diarrhea.  In the ER his CBC was wnl, started on IVFs.  He was seen by PT, amb with quad cane and did well. Orthostatics were checked and have resolved with fluids,  and he denies any further dizziness or lightheadeness.  U/A was noted for mild pyuria, but since pt was symptomatic was placed on Rocephin. he states that he has a h/o colonized pseudomonas in his urine.His Ct chest was noted for patent central airways. Peripheral reticular opacities predominantly involving the lung bases progressive since 01/15/2021 consistent with pulmonary fibrosis. PT was not aware of this diagnoses. He was seen here by Pulmonary, echocardiogram was performed to rule out Pulm HTN:  echo: LVEF 53%, no AS, trace TR/NE, PASP 16 mmhg.  Pt is going home today after his Rocephin IV, which will then be changed to ceftin x 7 days.  Will await cultures and cover with appropriate abxs if needed  Blood cultures with NGTD.  Pt was seen by Physical therapy, no recommendations made, pt has all the equipment he needs at home and follows with his own therapist    PHYSICAL EXAM:    GENERAL: Comfortable, no acute distress   HEAD:  Normocephalic, atraumatic  EYES: EOMI, PERRLA  HEENT: Moist mucous membranes  NECK: Supple, No JVD  NERVOUS SYSTEM:  Alert & Oriented X3, Motor Strength 5/5 RUE/RLE,  3/5 LUE/LLE  CHEST/LUNG: Clear to auscultation bilaterally  HEART: Regular rate and rhythm  ABDOMEN: Soft, non tender, Nondistended, Bowel sounds present  GENITOURINARY: Voiding, no palpable bladder  EXTREMITIES:   No clubbing, cyanosis, or edema  MUSCULOSKELETAL- No muscle tenderness, no joint tenderness  SKIN-no rash                          14.9   6.84  )-----------( 147      ( 28 Aug 2024 09:33 )             44.4       08-28    138  |  110<H>  |  18  ----------------------------<  100<H>  4.2   |  26  |  0.74    Ca    9.4      28 Aug 2024 09:33    TPro  6.8  /  Alb  3.3  /  TBili  0.4  /  DBili  x   /  AST  36  /  ALT  40  /  AlkPhos  65  08-27    < from: CT Chest No Cont (08.27.24 @ 16:47) >    FINDINGS:    LUNGS AND LARGE AIRWAYS: Patent central airways. Peripheral reticular   opacities predominantly involving the lung bases progressive since   01/15/2021 consistent with pulmonary fibrosis. Lower lobe traction   bronchiectasis. No focal consolidation.  PLEURA: No pleural effusion.  VESSELS: Within normal limits.  HEART: Heart size is normal. No pericardial effusion. Coronary artery   calcification.  MEDIASTINUM AND MAURICIO: No lymphadenopathy.  CHEST WALL AND LOWER NECK: Within normal limits.  VISUALIZED UPPER ABDOMEN: Partially visualized right renal cyst.   Cholecystectomy.  BONES: Degenerative change. Kyphosis.    IMPRESSION:  Appearance suggesting pulmonary fibrosis.  No focal consolidation    < from: Xray Chest 1 View- PORTABLE-Urgent (08.27.24 @ 13:02) >    IMPRESSION: Prior density associated with left lower rib fractures has   improved.    final diagnoses:      # orthostasis, dizziness  resolved: most likely from hypovolemia  Pt is encouraged to increase fluid intake    # SOB/ pulmonary fibrosis  satting well on Room air    # H/O CVA with left hemiparesis  cont plavix and statin    # + UTI with mild pyuria  rec'd rocephin x 3 doses  cont ceftin x 4 more days    time for discharge 45 minutes

## 2024-08-29 NOTE — DISCHARGE NOTE PROVIDER - CARE PROVIDERS DIRECT ADDRESSES
[email or web address]

## 2024-08-30 ENCOUNTER — TRANSCRIPTION ENCOUNTER (OUTPATIENT)
Age: 79
End: 2024-08-30

## 2024-09-05 ENCOUNTER — APPOINTMENT (OUTPATIENT)
Dept: CARE COORDINATION | Facility: HOME HEALTH | Age: 79
End: 2024-09-05
Payer: MEDICARE

## 2024-09-05 ENCOUNTER — TRANSCRIPTION ENCOUNTER (OUTPATIENT)
Age: 79
End: 2024-09-05

## 2024-09-05 DIAGNOSIS — Z86.73 PERSONAL HISTORY OF TRANSIENT ISCHEMIC ATTACK (TIA), AND CEREBRAL INFARCTION W/OUT RESIDUAL DEFICITS: ICD-10-CM

## 2024-09-05 DIAGNOSIS — E78.5 HYPERLIPIDEMIA, UNSPECIFIED: ICD-10-CM

## 2024-09-05 DIAGNOSIS — F32.A DEPRESSION, UNSPECIFIED: ICD-10-CM

## 2024-09-05 DIAGNOSIS — L11.1 TRANSIENT ACANTHOLYTIC DERMATOSIS [GROVER]: ICD-10-CM

## 2024-09-05 DIAGNOSIS — K21.9 GASTRO-ESOPHAGEAL REFLUX DISEASE WITHOUT ESOPHAGITIS: ICD-10-CM

## 2024-09-05 DIAGNOSIS — Z91.81 HISTORY OF FALLING: ICD-10-CM

## 2024-09-05 DIAGNOSIS — N39.0 URINARY TRACT INFECTION, SITE NOT SPECIFIED: ICD-10-CM

## 2024-09-05 DIAGNOSIS — N40.0 BENIGN PROSTATIC HYPERPLASIA WITHOUT LOWER URINARY TRACT SYMPTOMS: ICD-10-CM

## 2024-09-05 DIAGNOSIS — N39.41 URGE INCONTINENCE: ICD-10-CM

## 2024-09-05 DIAGNOSIS — I10 ESSENTIAL (PRIMARY) HYPERTENSION: ICD-10-CM

## 2024-09-05 DIAGNOSIS — J84.10 PULMONARY FIBROSIS, UNSPECIFIED: ICD-10-CM

## 2024-09-05 DIAGNOSIS — Z88.8 ALLERGY STATUS TO OTHER DRUGS, MEDICAMENTS AND BIOLOGICAL SUBSTANCES: ICD-10-CM

## 2024-09-05 DIAGNOSIS — E86.0 DEHYDRATION: ICD-10-CM

## 2024-09-05 DIAGNOSIS — G43.909 MIGRAINE, UNSPECIFIED, NOT INTRACTABLE, WITHOUT STATUS MIGRAINOSUS: ICD-10-CM

## 2024-09-05 DIAGNOSIS — J47.9 BRONCHIECTASIS, UNCOMPLICATED: ICD-10-CM

## 2024-09-05 DIAGNOSIS — E86.1 HYPOVOLEMIA: ICD-10-CM

## 2024-09-05 DIAGNOSIS — I69.354 HEMIPLEGIA AND HEMIPARESIS FOLLOWING CEREBRAL INFARCTION AFFECTING LEFT NON-DOMINANT SIDE: ICD-10-CM

## 2024-09-05 DIAGNOSIS — Z79.82 LONG TERM (CURRENT) USE OF ASPIRIN: ICD-10-CM

## 2024-09-05 DIAGNOSIS — I95.1 ORTHOSTATIC HYPOTENSION: ICD-10-CM

## 2024-09-05 PROCEDURE — 99349 HOME/RES VST EST MOD MDM 40: CPT

## 2024-09-06 VITALS
TEMPERATURE: 97.2 F | RESPIRATION RATE: 17 BRPM | HEART RATE: 99 BPM | DIASTOLIC BLOOD PRESSURE: 68 MMHG | OXYGEN SATURATION: 95 % | SYSTOLIC BLOOD PRESSURE: 90 MMHG

## 2024-09-06 PROBLEM — F32.A DEPRESSION: Status: ACTIVE | Noted: 2024-09-06

## 2024-09-06 PROBLEM — Z86.73 CHRONIC ISCHEMIC RIGHT MCA STROKE: Status: ACTIVE | Noted: 2021-06-02

## 2024-09-06 RX ORDER — OXYBUTYNIN CHLORIDE 5 MG/1
5 TABLET ORAL TWICE DAILY
Refills: 0 | Status: ACTIVE | COMMUNITY
Start: 2024-09-06

## 2024-09-06 NOTE — REVIEW OF SYSTEMS
[Fatigue] : fatigue [Frequency] : frequency [Muscle Weakness] : muscle weakness [Negative] : Heme/Lymph

## 2024-09-06 NOTE — PHYSICAL EXAM
[No Acute Distress] : no acute distress [Normal Sclera/Conjunctiva] : normal sclera/conjunctiva [Normal Outer Ear/Nose] : the outer ears and nose were normal in appearance [No JVD] : no jugular venous distention [No Respiratory Distress] : no respiratory distress  [Clear to Auscultation] : lungs were clear to auscultation bilaterally [Normal Rate] : normal rate  [No Edema] : there was no peripheral edema [Soft] : abdomen soft [Coordination Grossly Intact] : coordination grossly intact [Normal Affect] : the affect was normal [Normal Insight/Judgement] : insight and judgment were intact [de-identified] : left sided weakness

## 2024-09-06 NOTE — HISTORY OF PRESENT ILLNESS
[Post-hospitalization from ___ Hospital] : Post-hospitalization from [unfilled] Hospital [Admitted on: ___] : The patient was admitted on [unfilled] [Discharged on ___] : discharged on [unfilled] [Discharge Summary] : discharge summary [Discharge Med List] : discharge medication list [Med Reconciliation] : medication reconciliation has been completed [Patient Contacted By: ____] : and contacted by [unfilled] [FreeTextEntry3] : Patient was contacted by TCM RN on 830/24 for 24/48 hour call and documentation is present in the Clinical Viewer  [FreeTextEntry2] : 74 y/o M with PMHx of  CVA w/ left sided weakness Left upper extremity worse than left lower extremity, Hyperlipidemia, BPH s/p TURP, 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  U/A was noted for mild pyuria, but since pt was symptomatic was placed on Rocephin. he statds that he has a h/o colonized pseudomonas in his urine.His Ct chest was noted for patent central airways. Peripheral reticular opacities predominantly involving the lung bases progressive since 01/15/2021 consistent with pulmonary fibrosis.  Since dc, pt states he feels better, urine is clear, but still has a foggy feeling in his head.  His weakness is resolving and he is starting outpt PT next week.  He saw his PCP Dr. Portillo who ordered repeat UA/Culture- pending results.  Plan is to f/u with pulm, he is on vacation this week and office will call back with appt.  TCM numbers provided for any questions/concerns.

## 2024-09-06 NOTE — PHYSICAL EXAM
[No Acute Distress] : no acute distress [Normal Sclera/Conjunctiva] : normal sclera/conjunctiva [Normal Outer Ear/Nose] : the outer ears and nose were normal in appearance [No JVD] : no jugular venous distention [No Respiratory Distress] : no respiratory distress  [Clear to Auscultation] : lungs were clear to auscultation bilaterally [Normal Rate] : normal rate  [No Edema] : there was no peripheral edema [Soft] : abdomen soft [Coordination Grossly Intact] : coordination grossly intact [Normal Affect] : the affect was normal [Normal Insight/Judgement] : insight and judgment were intact [de-identified] : left sided weakness

## 2024-09-13 ENCOUNTER — LABORATORY RESULT (OUTPATIENT)
Age: 79
End: 2024-09-13

## 2024-09-13 ENCOUNTER — APPOINTMENT (OUTPATIENT)
Dept: INTERNAL MEDICINE | Facility: CLINIC | Age: 79
End: 2024-09-13
Payer: MEDICARE

## 2024-09-13 VITALS
DIASTOLIC BLOOD PRESSURE: 80 MMHG | OXYGEN SATURATION: 95 % | TEMPERATURE: 98.2 F | WEIGHT: 212 LBS | HEIGHT: 70 IN | HEART RATE: 108 BPM | BODY MASS INDEX: 30.35 KG/M2 | SYSTOLIC BLOOD PRESSURE: 130 MMHG

## 2024-09-13 DIAGNOSIS — J45.909 UNSPECIFIED ASTHMA, UNCOMPLICATED: ICD-10-CM

## 2024-09-13 DIAGNOSIS — R06.00 DYSPNEA, UNSPECIFIED: ICD-10-CM

## 2024-09-13 DIAGNOSIS — J47.9 BRONCHIECTASIS, UNCOMPLICATED: ICD-10-CM

## 2024-09-13 DIAGNOSIS — Z87.891 PERSONAL HISTORY OF NICOTINE DEPENDENCE: ICD-10-CM

## 2024-09-13 DIAGNOSIS — J98.4 OTHER DISORDERS OF LUNG: ICD-10-CM

## 2024-09-13 DIAGNOSIS — J84.10 PULMONARY FIBROSIS, UNSPECIFIED: ICD-10-CM

## 2024-09-13 PROCEDURE — 94727 GAS DIL/WSHOT DETER LNG VOL: CPT

## 2024-09-13 PROCEDURE — 94729 DIFFUSING CAPACITY: CPT

## 2024-09-13 PROCEDURE — ZZZZZ: CPT

## 2024-09-13 PROCEDURE — 99215 OFFICE O/P EST HI 40 MIN: CPT | Mod: 25

## 2024-09-13 PROCEDURE — 94060 EVALUATION OF WHEEZING: CPT

## 2024-09-13 NOTE — DATA REVIEWED
[FreeTextEntry1] : A pulmonary function test is performed.  Lung volumes reveal a mild decrease in the total lung capacity and FRC.  The residual volume is moderately reduced.  The vital capacity is normal.  Lung mechanics are within normal limits.  Slight bronchodilator reactivity is demonstrated.  The DLCO is mildly reduced at 66%, however when corrected for alveolar volume it is normal.  The saturation is maintained at 95%.  This represents a mild to moderate degree of restriction.  Restriction may be due to the patient's centripetal obesity, as well as a possible contribution due to his interstitial lung disease/fibrosis and traction bronchiectasis.  Obstruction is not noted however slight airway reactivity is demonstrated.

## 2024-09-13 NOTE — PHYSICAL EXAM
[No Acute Distress] : no acute distress [Well Nourished] : well nourished [Well Developed] : well developed [Well-Appearing] : well-appearing [Normal Sclera/Conjunctiva] : normal sclera/conjunctiva [EOMI] : extraocular movements intact [Normal Outer Ear/Nose] : the outer ears and nose were normal in appearance [Normal Oropharynx] : the oropharynx was normal [No JVD] : no jugular venous distention [Supple] : supple [No Respiratory Distress] : no respiratory distress  [No Accessory Muscle Use] : no accessory muscle use [Normal Rate] : normal rate  [Regular Rhythm] : with a regular rhythm [Normal S1, S2] : normal S1 and S2 [No Murmur] : no murmur heard [No Carotid Bruits] : no carotid bruits [No Edema] : there was no peripheral edema [No Extremity Clubbing/Cyanosis] : no extremity clubbing/cyanosis [Soft] : abdomen soft [Non Tender] : non-tender [Non-distended] : non-distended [No Masses] : no abdominal mass palpated [No HSM] : no HSM [Normal Bowel Sounds] : normal bowel sounds [Normal Supraclavicular Nodes] : no supraclavicular lymphadenopathy [Normal Posterior Cervical Nodes] : no posterior cervical lymphadenopathy [Normal Anterior Cervical Nodes] : no anterior cervical lymphadenopathy [No CVA Tenderness] : no CVA  tenderness [No Spinal Tenderness] : no spinal tenderness [No Rash] : no rash [Coordination Grossly Intact] : coordination grossly intact [Normal Affect] : the affect was normal [Normal Insight/Judgement] : insight and judgment were intact [de-identified] : There is mild erythema of the posterior pharynx. [de-identified] : There are minimal crackles at the right base laterally.  The remainder of the lung zones are fairly clear.  There is fair to good air entry bilaterally. [de-identified] : There is a contracture of the left hand

## 2024-09-13 NOTE — HISTORY OF PRESENT ILLNESS
[FreeTextEntry1] :  The patient comes in for a new patient evaluation. [de-identified] :  Mr. SANCHEZ is a 78-year-old male with a history of CVA, orthostatic hypotension, BPH s/p TURP, pulmonary fibrosis, FELIPE, airway hyperreactivity. He presents today for an evaluation of interstital pulmonary fibrosis. I have seen this patient in the past, his last appointment was approximately 7 years ago.   The patient has a history of CVA, which occurred in 2008. The etiology of the CVA was due to a right carotid artery dissection in 2008. The patient is now maintained on Clopidogrel 75 MG once daily and Lipitor 80 MG once nocturnally. The patient follows with his PCP, and cardiologist, Dr. Portillo, for monitoring. There has been no chest pain, palpitations, or PND. He is felt to be stable in this regard.   The patient has recently begun c/o fatigue. He followed up with his PCP, Dr. Portillo, for fatigue and was found to have orthostatic hypotension. The patient states that an EKG was performed at Dr. Portillo's office, and he was also found to have tachycardia. The patient was subsequently sent to the ER for a cardiac workup. The patient was admitted on 8/27/24, where a workup was performed. The patient underwent an echocardiogram, which was unremarkable. He also underwent a CT scan of the chest. The CT revealed peripheral reticular opacities predominantly involving the lung bases, which appeared to be progressive since 01/15/2021.  The changes are felt to be consistent with pulmonary fibrosis.  Traction bronchiectasis was present as well.  The patient was treated with fluids and returned to his baseline thereafter. It was felt that the patient's orthostasis was due to hypovolemia. He was treated with Rocephin a presumed Pseudomonas urinary tract infection.  He was then sent home on a regimen of Ceftin for an initial 7 days at the time of discharge.  There was concern about possible colonization from the Pseudomonas.     As of late, the patient has noticed his SOB worsening. He states that his exercise capacity has decreased significantly, and he estimates an approximate 20 ft walking exercise capacity before becoming breathless. His weight has increased over the last few months, having gained approximately 15 lbs. He does admit that his increased breathlessness coincides with the recent weight gain. He does report itchy and dry eyes as well as a dry mouth. He denies a history of arthritic or rheumatologic illnesses, and denies any joint swelling. He also denies any recent dysphagia. The patient has never been maintained on any bronchodilators.  He notes having mild gastroesophageal reflux which is sporadic. There has been no cough, sputum production, hemoptysis, or wheeze.   The remainder of the patient's pulmonary history is positive for cigarette smoking. He smoked 1 PPD for approximately 8 years.  He quit 46 years ago.  He denies a past history of asthma, TB, or TB exposure.  He does have a history of multiple episodes of pneumonia. He denies ankle swelling or orthopnea. He does report occasional paroxysmal nocturnal dyspnea. He states that he occasionally experiences reflux and heartburn at night. Of note, when I saw the patient in the past, I referred him for a polysomnogram to assess for MATIAS due to a history of snoring. The patient states that he never underwent the study. He sleeps on 2 pillows. He does have pets, a dog and a tortoise. He has not traveled in the past 2 years. He denies exposure to toxic chemicals and/or fumes. There have been no fevers, chills, or night sweats. There have been no other acute constitutional symptoms. He comes in for this assessment.

## 2024-09-13 NOTE — ADDENDUM
[FreeTextEntry1] : This note was written by Lisa Telles on 09/13/2024 acting as a medical scribe for Dr. Frank Sunshine MD. All medical entries made by the scribe were at my, Dr. Frank Sunshine's, direction and personally dictated by me on 09/13/2024. I have reviewed the chart and agree that the record accurately reflects my personal performance of the history, review of systems, assessment, and plan. I have also personally directed, reviewed, and agreed with the chart.

## 2024-09-13 NOTE — PLAN
[FreeTextEntry1] : 1. Continue current medications as outlined above.  2.  The patient will undergo extensive serologic bloodwork in the near future to include sed rate, CRP, rheumatoid factor, SCL 70 antibodies, centromere antibody, ANCA level, hypersensitivity pneumonitis panel, serum IgE level, angiotensin-converting enzyme level, antihistone antibody, anti-double-stranded DNA, TIESHA, CCP, Aspergillus, coccidiomycosis and histoplasmosis titers, Chelsie 1 antibody, Sjogren's antibodies, CPK, ribosomal P protein antibody, systemic sclerosis 12 antibody panel, avise panel, and ribosomal P protein antibody.   3.  Follow up in 3 weeks with office visit and to review bloodwork.  Further recommendations will be made at that time regarding possible need for tissue diagnosis versus institution of therapy.  Of note is the fact that there has been a slow progression of the interstitial lung disease between 2021 and the present study.  Traction bronchiectasis is noted.  There is, however, no evidence for honeycombing.  The differential diagnosis includes interstitial lung disease secondary to collagen vascular disease, idiopathic pulmonary fibrosis, idiopathic interstitial pneumonia due to processes such as NSIP, as well as granulomatous diseases.  The peripheral nature of the infiltrates, with lower zone predominance and lack of honeycombing, would favor connective tissue disease associated ILD, cryptogenic organizing pneumonia, and IPF.  4.  The patient has been referred to Dr. Barron.    5.  Routine medical follow-up with his primary care physician, and cardiologist, Dr. Portillo.    6.  The Influenza and COVID vaccines are recommended in the fall.   7.  Cardiovascular exercise as tolerated.  8.  I would recommend a 6-minute walk test, but due to the patient's prior cerebrovascular accident, I do not feel that the test would be accurate.

## 2024-09-13 NOTE — REVIEW OF SYSTEMS
[Dryness] : dryness [Itching] : itching [Shortness Of Breath] : shortness of breath [Dyspnea on Exertion] : dyspnea on exertion [Heartburn] : heartburn [Negative] : Heme/Lymph [FreeTextEntry3] : see hpi [FreeTextEntry6] : see hpi [FreeTextEntry7] : see hpi

## 2024-09-16 LAB
ACE BLD-CCNC: 59 U/L
CCP AB SER IA-ACNC: <8 U/ML
CENTROMERE IGG SER-ACNC: <0.2 AL
CK SERPL-CCNC: 454 U/L
CRP SERPL-MCNC: <3 MG/L
DSDNA AB SER-ACNC: 8 IU/ML
ENA JO1 AB SER IA-ACNC: <0.2 AL
ERYTHROCYTE [SEDIMENTATION RATE] IN BLOOD BY WESTERGREN METHOD: 18 MM/HR
RF+CCP IGG SER-IMP: NEGATIVE
RHEUMATOID FACT SER QL: <10 IU/ML
RIBOSOMAL P AB SER IA-ACNC: <0.2 AL

## 2024-09-19 LAB
A FLAVUS AB FLD QL: NEGATIVE
A FUMIGATUS AB FLD QL: NEGATIVE
A NIGER AB FLD QL: NEGATIVE
ALTERN TENCAPG(M6): 3.3 MCG/ML
ANA PAT FLD IF-IMP: ABNORMAL
ANA SER IF-ACNC: ABNORMAL
ANTI-GBM IGG CONCENTRATION: <2.9
ANTI-MPO IGG CONCENTRATION: <3.2
ANTI-NEUTROPHIL CYTOPLASMIC ANTIBODIES - PATTERN: NORMAL
ANTI-NEUTROPHIL CYTOPLASMIC ANTIBODIES - TITER: NEGATIVE
ANTI-PR3 IGG CONCENTRATION: <2.3
ASPER FUMCAPG(M3): 35.5 MCG/ML
AUREOBASCAPG(M12): 5.6 MCG/ML
AVISE VASCULITIS RESULT: NORMAL
H CAPSUL AG SER IA-ACNC: NORMAL NG/ML
HISTONE AB SER QL: 1.3 UNITS
HISTPL INTERPRETATION: NEGATIVE
LACEYELLA SACCHARI IGG: 9.5 MCG/ML
MICROPOLYCAPG(M22): <2 MCG/ML
PENIC CHRYCAPG(M1): 24.9 MCG/ML
PHOMA BETAE IGG: 2.8 MCG/ML
SPECIMEN SOURCE: NORMAL
TRICHODERMA VIRIDE IGG: 3.8 MCG/ML

## 2024-09-21 LAB
CENP-A: <11 SI
CENP-B: <11 SI
FIBRILLARIN: <11 SI
PM/SCL-100: <11 SI
PM/SCL-75: <11 SI
RP11: 23 SI
RP155: <11 SI
SCL-70: <11 SI
TH/TO: <11 SI
U1-SNRNP RNP A: <11 SI
U1-SNRNP RNP C: <11 SI
U1-SNRNP RNP-70KD: <11 SI

## 2024-09-24 ENCOUNTER — TRANSCRIPTION ENCOUNTER (OUTPATIENT)
Age: 79
End: 2024-09-24

## 2024-09-25 ENCOUNTER — APPOINTMENT (OUTPATIENT)
Dept: UROLOGY | Facility: CLINIC | Age: 79
End: 2024-09-25

## 2024-10-03 ENCOUNTER — APPOINTMENT (OUTPATIENT)
Dept: INTERNAL MEDICINE | Facility: CLINIC | Age: 79
End: 2024-10-03
Payer: MEDICARE

## 2024-10-03 VITALS
HEIGHT: 70 IN | SYSTOLIC BLOOD PRESSURE: 120 MMHG | HEART RATE: 98 BPM | TEMPERATURE: 98.7 F | BODY MASS INDEX: 31.07 KG/M2 | DIASTOLIC BLOOD PRESSURE: 80 MMHG | RESPIRATION RATE: 15 BRPM | WEIGHT: 217 LBS | OXYGEN SATURATION: 95 %

## 2024-10-03 DIAGNOSIS — J47.9 BRONCHIECTASIS, UNCOMPLICATED: ICD-10-CM

## 2024-10-03 DIAGNOSIS — Z87.891 PERSONAL HISTORY OF NICOTINE DEPENDENCE: ICD-10-CM

## 2024-10-03 DIAGNOSIS — J84.10 PULMONARY FIBROSIS, UNSPECIFIED: ICD-10-CM

## 2024-10-03 DIAGNOSIS — J45.909 UNSPECIFIED ASTHMA, UNCOMPLICATED: ICD-10-CM

## 2024-10-03 DIAGNOSIS — R06.00 DYSPNEA, UNSPECIFIED: ICD-10-CM

## 2024-10-03 DIAGNOSIS — J98.4 OTHER DISORDERS OF LUNG: ICD-10-CM

## 2024-10-03 PROCEDURE — 99214 OFFICE O/P EST MOD 30 MIN: CPT

## 2024-10-03 NOTE — HISTORY OF PRESENT ILLNESS
[FreeTextEntry1] :  The patient comes in for a follow-up pulmonary evaluation. [de-identified] : The patient was admitted to the hospital on 8/27/24 due to tachycardia, where a workup was performed. The patient underwent an echocardiogram, which was unremarkable. He also underwent a CT scan of the chest. The CT revealed peripheral reticular opacities predominantly involving the lung bases, which appeared to be progressive since 01/15/2021. The changes are felt to be consistent with pulmonary fibrosis. Traction bronchiectasis was present as well. At the time of the patient's last visit on 9/13/24, he was c/o increased SOB. The patient stated that his exercise capacity had decreased significantly, and he estimated an approximate 20 ft walking exercise capacity before becoming breathless. His weight had increased over the last few months, having gained approximately 15 lbs. He admitted that his increased breathlessness coincided with the recent weight gain. He also reported itchy and dry eyes as well as a dry mouth. I subsequently ordered extensive bloodwork and referred the patient to Dr. Barron. He is set to see Dr. Barron on 10/15/24.   At this time, the patient continues to experience SOB and fatigue. He notes that his exercise capacity has not improved. The patient has gained approximately 5 lbs since 9/13/24. He continues to deny any recent dysphagia. There has been no cough, sputum production, hemoptysis, or wheeze. There have been no fevers, chills, or night sweats.  There has been no rash or obvious joint swelling.  He does not choke while eating.  There have been no other acute constitutional symptoms. He comes in for this assessment.

## 2024-10-03 NOTE — ADDENDUM
[FreeTextEntry1] : This note was written by Lisa Telles on 10/03/2024 acting as a medical scribe for Dr. Frank Sunshine MD. All medical entries made by the scribe were at my, Dr. Frank Sunshine's, direction and personally dictated by me on 10/03/2024. I have reviewed the chart and agree that the record accurately reflects my personal performance of the history, review of systems, assessment, and plan. I have also personally directed, reviewed, and agreed with the chart.

## 2024-10-03 NOTE — PHYSICAL EXAM
[No Acute Distress] : no acute distress [Well Developed] : well developed [Well-Appearing] : well-appearing [Normal Sclera/Conjunctiva] : normal sclera/conjunctiva [EOMI] : extraocular movements intact [Normal Outer Ear/Nose] : the outer ears and nose were normal in appearance [Normal Oropharynx] : the oropharynx was normal [No JVD] : no jugular venous distention [Supple] : supple [No Respiratory Distress] : no respiratory distress  [No Accessory Muscle Use] : no accessory muscle use [Normal Rate] : normal rate  [Regular Rhythm] : with a regular rhythm [Normal S1, S2] : normal S1 and S2 [No Murmur] : no murmur heard [No Carotid Bruits] : no carotid bruits [No Edema] : there was no peripheral edema [No Extremity Clubbing/Cyanosis] : no extremity clubbing/cyanosis [Soft] : abdomen soft [Non Tender] : non-tender [Non-distended] : non-distended [No Masses] : no abdominal mass palpated [No HSM] : no HSM [Normal Bowel Sounds] : normal bowel sounds [Normal Supraclavicular Nodes] : no supraclavicular lymphadenopathy [Normal Posterior Cervical Nodes] : no posterior cervical lymphadenopathy [Normal Anterior Cervical Nodes] : no anterior cervical lymphadenopathy [No Rash] : no rash [Coordination Grossly Intact] : coordination grossly intact [Normal Affect] : the affect was normal [Normal Insight/Judgement] : insight and judgment were intact [de-identified] : Mildly obese male [de-identified] : There is mild erythema of the posterior pharynx. [de-identified] : There are minimal crackles at the right base laterally.  The remainder of the lung zones are fairly clear.  There is fair to good air entry bilaterally. [de-identified] : There is a contracture of the left hand

## 2024-10-03 NOTE — PLAN
[FreeTextEntry1] : 1. Continue current medications as outlined above.   2. Follow up in 4 months pre-post spirometry and DLCO.    3. The patient has an appointment with Dr. Barron on 10/15/24 for an evaluation of his interstitial lung disease.  Of note is the fact that there has been a slow progression of the interstitial lung disease between 2021 and the present study. Traction bronchiectasis is noted. There is, however, no evidence for honeycombing.    4. Follow up with rheumatologist, Dr. Mccracken, regarding results of the extensive bloodwork ordered at the last visit.  There is an abnormal TIESHA noted.  Will need to evaluate whether or not a rheumatologic contribution to his interstitial lung disease, is present and active.   5. The Influenza and COVID vaccines are recommended in the fall.   6. Cardiovascular exercise as tolerated.

## 2024-10-15 ENCOUNTER — APPOINTMENT (OUTPATIENT)
Dept: PULMONOLOGY | Facility: CLINIC | Age: 79
End: 2024-10-15

## 2024-10-15 VITALS
SYSTOLIC BLOOD PRESSURE: 116 MMHG | TEMPERATURE: 97.6 F | OXYGEN SATURATION: 95 % | HEART RATE: 98 BPM | DIASTOLIC BLOOD PRESSURE: 88 MMHG | HEIGHT: 70 IN | BODY MASS INDEX: 30.78 KG/M2 | WEIGHT: 215 LBS

## 2024-10-15 PROCEDURE — 99205 OFFICE O/P NEW HI 60 MIN: CPT

## 2024-10-16 ENCOUNTER — EMERGENCY (EMERGENCY)
Facility: HOSPITAL | Age: 79
LOS: 0 days | Discharge: HOME | End: 2024-10-16
Attending: STUDENT IN AN ORGANIZED HEALTH CARE EDUCATION/TRAINING PROGRAM
Payer: MEDICARE

## 2024-10-16 VITALS
OXYGEN SATURATION: 97 % | DIASTOLIC BLOOD PRESSURE: 80 MMHG | TEMPERATURE: 98 F | HEIGHT: 70 IN | RESPIRATION RATE: 18 BRPM | SYSTOLIC BLOOD PRESSURE: 115 MMHG | HEART RATE: 91 BPM

## 2024-10-16 VITALS
OXYGEN SATURATION: 95 % | HEART RATE: 71 BPM | TEMPERATURE: 98 F | DIASTOLIC BLOOD PRESSURE: 88 MMHG | SYSTOLIC BLOOD PRESSURE: 174 MMHG | RESPIRATION RATE: 18 BRPM

## 2024-10-16 DIAGNOSIS — S00.83XA CONTUSION OF OTHER PART OF HEAD, INITIAL ENCOUNTER: ICD-10-CM

## 2024-10-16 DIAGNOSIS — K21.9 GASTRO-ESOPHAGEAL REFLUX DISEASE WITHOUT ESOPHAGITIS: ICD-10-CM

## 2024-10-16 DIAGNOSIS — Z88.8 ALLERGY STATUS TO OTHER DRUGS, MEDICAMENTS AND BIOLOGICAL SUBSTANCES: ICD-10-CM
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
## 2024-10-16 DIAGNOSIS — K76.0 FATTY (CHANGE OF) LIVER, NOT ELSEWHERE CLASSIFIED: ICD-10-CM

## 2024-10-16 DIAGNOSIS — F41.9 ANXIETY DISORDER, UNSPECIFIED: ICD-10-CM
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
## 2024-10-16 DIAGNOSIS — Z90.49 ACQUIRED ABSENCE OF OTHER SPECIFIED PARTS OF DIGESTIVE TRACT: Chronic | ICD-10-CM

## 2024-10-16 DIAGNOSIS — F32.A DEPRESSION, UNSPECIFIED: ICD-10-CM

## 2024-10-16 DIAGNOSIS — Z79.02 LONG TERM (CURRENT) USE OF ANTITHROMBOTICS/ANTIPLATELETS: ICD-10-CM

## 2024-10-16 DIAGNOSIS — Z98.890 OTHER SPECIFIED POSTPROCEDURAL STATES: Chronic | ICD-10-CM

## 2024-10-16 DIAGNOSIS — Z86.73 PERSONAL HISTORY OF TRANSIENT ISCHEMIC ATTACK (TIA), AND CEREBRAL INFARCTION WITHOUT RESIDUAL DEFICITS: ICD-10-CM

## 2024-10-16 LAB
ENA RNP AB SER IA-ACNC: <0.2 AL
ENA SM AB SER IA-ACNC: <0.2 AL
IGG SER QL IEP: 904 MG/DL
IGG1 SER-MCNC: 509 MG/DL
IGG2 SER-MCNC: 286 MG/DL
IGG3 SER-MCNC: 75.1 MG/DL
IGG4 SER-MCNC: 20.3 MG/DL

## 2024-10-16 PROCEDURE — 70486 CT MAXILLOFACIAL W/O DYE: CPT | Mod: MC

## 2024-10-16 PROCEDURE — 70486 CT MAXILLOFACIAL W/O DYE: CPT | Mod: 26,MC

## 2024-10-16 PROCEDURE — 71045 X-RAY EXAM CHEST 1 VIEW: CPT

## 2024-10-16 PROCEDURE — 82962 GLUCOSE BLOOD TEST: CPT

## 2024-10-16 PROCEDURE — 72125 CT NECK SPINE W/O DYE: CPT | Mod: 26,MC

## 2024-10-16 PROCEDURE — 70450 CT HEAD/BRAIN W/O DYE: CPT | Mod: MC

## 2024-10-16 PROCEDURE — 72170 X-RAY EXAM OF PELVIS: CPT

## 2024-10-16 PROCEDURE — 71045 X-RAY EXAM CHEST 1 VIEW: CPT | Mod: 26

## 2024-10-16 PROCEDURE — 99284 EMERGENCY DEPT VISIT MOD MDM: CPT | Mod: 25

## 2024-10-16 PROCEDURE — 70450 CT HEAD/BRAIN W/O DYE: CPT | Mod: 26,MC

## 2024-10-16 PROCEDURE — 72170 X-RAY EXAM OF PELVIS: CPT | Mod: 26

## 2024-10-16 PROCEDURE — 99284 EMERGENCY DEPT VISIT MOD MDM: CPT

## 2024-10-16 PROCEDURE — 72125 CT NECK SPINE W/O DYE: CPT | Mod: MC

## 2024-10-16 PROCEDURE — 76376 3D RENDER W/INTRP POSTPROCES: CPT | Mod: 26

## 2024-10-16 PROCEDURE — 76376 3D RENDER W/INTRP POSTPROCES: CPT

## 2024-10-16 RX ORDER — TETANUS TOXOID, REDUCED DIPHTHERIA TOXOID AND ACELLULAR PERTUSSIS VACCINE, ADSORBED 5; 2.5; 8; 8; 2.5 [IU]/.5ML; [IU]/.5ML; UG/.5ML; UG/.5ML; UG/.5ML
0.5 SUSPENSION INTRAMUSCULAR ONCE
Refills: 0 | Status: COMPLETED | OUTPATIENT
Start: 2024-10-16 | End: 2024-10-16

## 2024-10-16 NOTE — ED ADULT NURSE NOTE - NSFALLHARMRISKINTERV_ED_ALL_ED
Assistance OOB with selected safe patient handling equipment if applicable/Communicate risk of Fall with Harm to all staff, patient, and family/Monitor gait and stability/Provide patient with walking aids/Provide visual cue: red socks, yellow wristband, yellow gown, etc/Reinforce activity limits and safety measures with patient and family/Bed in lowest position, wheels locked, appropriate side rails in place/Call bell, personal items and telephone in reach/Instruct patient to call for assistance before getting out of bed/chair/stretcher/Non-slip footwear applied when patient is off stretcher/[place] to call system/Physically safe environment - no spills, clutter or unnecessary equipment/Purposeful Proactive Rounding/Room/bathroom lighting operational, light cord in reach

## 2024-10-16 NOTE — ED PROVIDER NOTE - PROGRESS NOTE DETAILS
Nghia Mendez DO (Attending): Patient's imaging is negative, patient ambulated with cane which is his baseline and feels well.  Patient was initially amnestic to events but now alert and oriented.  Will discharge patient to follow-up with PMD.

## 2024-10-16 NOTE — ED PROVIDER NOTE - PHYSICAL EXAMINATION
GEN: Patient awake alert NAD.   HEENT: Left forehead hematoma with abrasion, no active bleeding, EOMI, no scleral icterus, moist MM  CARDIAC: RRR, S1, S2, no murmur. no chest wall ttp   PULM: CTA B/L no wheeze, rhonchi, rales.   ABD: soft NT, ND, no rebound no guarding, no CVA tenderness.   MSK: Moving all extremities, no edema. 5/5 strength and full ROM in all extremities.     NEURO: A&Ox3, no focal neurological deficits, no CTL midline spinal ttp   SKIN: warm, dry, no rash.

## 2024-10-16 NOTE — ED PROVIDER NOTE - NSICDXPASTMEDICALHX_GEN_ALL_CORE_FT
Discussion/Summary  Mom called stating pt has persistent cough with possible ringworm on head; offered acute appt @ 3p.m. Mom felt pt should be seen sooner so pt going to ER.      Signatures   Electronically signed by : Maryjo Allen R.N.; Mar  6 2017  9:05AM CST     PAST MEDICAL HISTORY:  Anxiety and depression     CVA (Cerebral Infarction) (ICD9 434.91) 2008    Diverticulitis, Intestine Large (ICD9 562.11)     Fatty liver     GERD (Gastroesophageal Reflux Disease) (ICD9 530.81)     [place]'s disease     Hip Fracture (ICD9 820.8) left    Migraine Headache (ICD9 346.90)     Obese

## 2024-10-16 NOTE — ED ADULT NURSE NOTE - OBJECTIVE STATEMENT
Pt presents to the ED s/p slip and fall from standing height. Pt was in bakery when he believes he tripped over doorway entry piece. +head strike +blood thinner. Does not remember the fall. Hematoma noted to left sided forehead. Bleeding controlled at this time. No further complaints. Wife at bedside.

## 2024-10-16 NOTE — ED PROVIDER NOTE - NSFOLLOWUPINSTRUCTIONS_ED_ALL_ED_FT
1.  Please follow-up with your primary care doctor next week.  2.  Please return to the ER if develop worsening confusion, tractable vomiting, intractable headache, numbness or tingling or weakness or anything of concern.

## 2024-10-16 NOTE — ED ADULT NURSE NOTE - NSICDXPASTMEDICALHX_GEN_ALL_CORE_FT
PAST MEDICAL HISTORY:  Anxiety and depression     CVA (Cerebral Infarction) (ICD9 434.91) 2008    Diverticulitis, Intestine Large (ICD9 562.11)     Fatty liver     GERD (Gastroesophageal Reflux Disease) (ICD9 530.81)     [place]'s disease     Hip Fracture (ICD9 820.8) left    Migraine Headache (ICD9 346.90)     Obese

## 2024-10-16 NOTE — ED PROVIDER NOTE - PATIENT PORTAL LINK FT
You can access the FollowMyHealth Patient Portal offered by [facility] by registering at the following website: [email or web address]. By joining [employer]’s FollowMyHealth portal, you will also be able to view your health information using other applications (apps) compatible with our system.

## 2024-10-16 NOTE — ED PROVIDER NOTE - CLINICAL SUMMARY MEDICAL DECISION MAKING FREE TEXT BOX
Nghia Mendez DO (Attending): 78 year-old male with past medical history of strokes on Plavix, anxiety and depression, fatty liver, [place]'s disease, diverticulitis, migraines, GERD, and CVA presents s/p mechanical trip and fall at the bagel store. Pt has L forehead hematoma and amnesia to event. No other complaints at this time. Patient with obvious forehead hematoma no other signs of trauma but is amnestic to the event.  Concern for ICH versus concussion, no other evidence of trauma and hemodynamically stable.  Plan for CT head, screening x-rays, update tetanus.  Reassess

## 2024-10-16 NOTE — ED PROVIDER NOTE - OBJECTIVE STATEMENT
78 year-old male with past medical history of strokes on Plavix, anxiety and depression, fatty liver, [place]'s disease, diverticulitis, migraines, GERD, and CVA presents s/p mechanical trip and fall at the bagel store. Pt has L forehead hematoma and amnesia to event. No other complaints at this time.

## 2024-10-16 NOTE — ED ADULT TRIAGE NOTE - CHIEF COMPLAINT QUOTE
Pt BIBEMS from bakery s/p trip and fall. Pt does not remember falling. +head strike,  hematoma noted to left side of forehead. No active bleeding. +blood thinners on plavix. Pt confused to year and time. Dr Mendez in triage to assess, NA called, pt taken straight to CT.

## 2024-10-17 ENCOUNTER — APPOINTMENT (OUTPATIENT)
Dept: ORTHOPEDIC SURGERY | Facility: CLINIC | Age: 79
End: 2024-10-17
Payer: MEDICARE

## 2024-10-17 ENCOUNTER — RESULT REVIEW (OUTPATIENT)
Age: 79
End: 2024-10-17

## 2024-10-17 DIAGNOSIS — Z98.890 OTHER SPECIFIED POSTPROCEDURAL STATES: ICD-10-CM

## 2024-10-17 DIAGNOSIS — Z87.81 OTHER SPECIFIED POSTPROCEDURAL STATES: ICD-10-CM

## 2024-10-17 PROCEDURE — 73552 X-RAY EXAM OF FEMUR 2/>: CPT | Mod: LT

## 2024-10-17 PROCEDURE — 73502 X-RAY EXAM HIP UNI 2-3 VIEWS: CPT

## 2024-10-17 PROCEDURE — 99214 OFFICE O/P EST MOD 30 MIN: CPT

## 2024-10-18 ENCOUNTER — NON-APPOINTMENT (OUTPATIENT)
Age: 79
End: 2024-10-18

## 2024-10-18 DIAGNOSIS — Z23 ENCOUNTER FOR IMMUNIZATION: ICD-10-CM

## 2024-10-25 ENCOUNTER — APPOINTMENT (OUTPATIENT)
Dept: INTERNAL MEDICINE | Facility: CLINIC | Age: 79
End: 2024-10-25

## 2024-10-25 PROCEDURE — 90471 IMMUNIZATION ADMIN: CPT

## 2024-10-25 PROCEDURE — 90678 RSV VACC PREF BIVALENT IM: CPT | Mod: GY

## 2024-10-30 DIAGNOSIS — Z87.898 PERSONAL HISTORY OF OTHER SPECIFIED CONDITIONS: ICD-10-CM

## 2024-10-30 DIAGNOSIS — Z01.818 ENCOUNTER FOR OTHER PREPROCEDURAL EXAMINATION: ICD-10-CM

## 2024-11-05 ENCOUNTER — NON-APPOINTMENT (OUTPATIENT)
Age: 79
End: 2024-11-05

## 2024-11-05 ENCOUNTER — RESULT REVIEW (OUTPATIENT)
Age: 79
End: 2024-11-05

## 2024-11-06 ENCOUNTER — NON-APPOINTMENT (OUTPATIENT)
Age: 79
End: 2024-11-06

## 2024-11-07 ENCOUNTER — NON-APPOINTMENT (OUTPATIENT)
Age: 79
End: 2024-11-07

## 2024-11-07 DIAGNOSIS — S32.9XXA FRACTURE OF UNSPECIFIED PARTS OF LUMBOSACRAL SPINE AND PELVIS, INITIAL ENCOUNTER FOR CLOSED FRACTURE: ICD-10-CM

## 2024-11-07 RX ORDER — HYDROMORPHONE HYDROCHLORIDE 2 MG/1
2 TABLET ORAL 4 TIMES DAILY
Qty: 42 | Refills: 0 | Status: ACTIVE | COMMUNITY
Start: 2024-11-07 | End: 1900-01-01

## 2024-11-26 ENCOUNTER — OUTPATIENT (OUTPATIENT)
Dept: OUTPATIENT SERVICES | Facility: HOSPITAL | Age: 79
LOS: 1 days | Discharge: HOME | End: 2024-11-26

## 2024-11-26 DIAGNOSIS — Z98.890 OTHER SPECIFIED POSTPROCEDURAL STATES: Chronic | ICD-10-CM

## 2024-11-26 DIAGNOSIS — Z90.49 ACQUIRED ABSENCE OF OTHER SPECIFIED PARTS OF DIGESTIVE TRACT: Chronic | ICD-10-CM

## 2024-11-26 DIAGNOSIS — D47.2 MONOCLONAL GAMMOPATHY: ICD-10-CM

## 2024-11-27 ENCOUNTER — LABORATORY RESULT (OUTPATIENT)
Age: 79
End: 2024-11-27

## 2024-11-27 ENCOUNTER — APPOINTMENT (OUTPATIENT)
Dept: HEMATOLOGY ONCOLOGY | Facility: CLINIC | Age: 79
End: 2024-11-27
Payer: MEDICARE

## 2024-11-27 VITALS
TEMPERATURE: 97.7 F | BODY MASS INDEX: 31.5 KG/M2 | HEIGHT: 70 IN | SYSTOLIC BLOOD PRESSURE: 122 MMHG | WEIGHT: 220 LBS | DIASTOLIC BLOOD PRESSURE: 74 MMHG | HEART RATE: 112 BPM | OXYGEN SATURATION: 95 %

## 2024-11-27 DIAGNOSIS — D47.2 MONOCLONAL GAMMOPATHY: ICD-10-CM

## 2024-11-27 PROCEDURE — 99204 OFFICE O/P NEW MOD 45 MIN: CPT

## 2024-12-02 LAB
DEPRECATED KAPPA LC FREE/LAMBDA SER: 1.8 RATIO
FREE KAPPA URINE: 115.18 MG/L
FREE KAPPA/LAMDA RATIO: 19.07 RATIO
FREE LAMDA URINE: 6.04 MG/L
KAPPA LC 24H UR QL: PRESENT
KAPPA LC CSF-MCNC: 1.49 MG/DL
KAPPA LC SERPL-MCNC: 2.68 MG/DL

## 2024-12-03 ENCOUNTER — APPOINTMENT (OUTPATIENT)
Dept: PULMONOLOGY | Facility: CLINIC | Age: 79
End: 2024-12-03
Payer: MEDICARE

## 2024-12-03 VITALS
HEIGHT: 70 IN | HEART RATE: 102 BPM | DIASTOLIC BLOOD PRESSURE: 68 MMHG | BODY MASS INDEX: 30.78 KG/M2 | RESPIRATION RATE: 16 BRPM | OXYGEN SATURATION: 96 % | WEIGHT: 215 LBS | TEMPERATURE: 98 F | SYSTOLIC BLOOD PRESSURE: 110 MMHG
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
## 2024-12-03 DIAGNOSIS — T17.928A FOOD IN RESPIRATORY TRACT, PART UNSPECIFIED CAUSING OTHER INJURY, INITIAL ENCOUNTER: ICD-10-CM

## 2024-12-03 PROCEDURE — 99215 OFFICE O/P EST HI 40 MIN: CPT

## 2024-12-03 RX ORDER — MYCOPHENOLATE MOFETIL 250 MG/1
250 CAPSULE ORAL TWICE DAILY
Qty: 240 | Refills: 5 | Status: ACTIVE | COMMUNITY
Start: 2024-12-03 | End: 1900-01-01

## 2024-12-03 RX ORDER — PANTOPRAZOLE 40 MG/1
40 TABLET, DELAYED RELEASE ORAL
Qty: 30 | Refills: 5 | Status: ACTIVE | COMMUNITY
Start: 2024-12-03 | End: 1900-01-01

## 2024-12-25 ENCOUNTER — EMERGENCY (EMERGENCY)
Facility: HOSPITAL | Age: 79
LOS: 0 days | Discharge: HOME | End: 2024-12-26
Attending: EMERGENCY MEDICINE
Payer: MEDICARE

## 2024-12-25 VITALS
TEMPERATURE: 98 F | HEART RATE: 127 BPM | DIASTOLIC BLOOD PRESSURE: 101 MMHG | SYSTOLIC BLOOD PRESSURE: 169 MMHG | OXYGEN SATURATION: 98 % | HEIGHT: 70 IN | RESPIRATION RATE: 18 BRPM

## 2024-12-25 DIAGNOSIS — Z98.890 OTHER SPECIFIED POSTPROCEDURAL STATES: Chronic | ICD-10-CM

## 2024-12-25 DIAGNOSIS — Z90.49 ACQUIRED ABSENCE OF OTHER SPECIFIED PARTS OF DIGESTIVE TRACT: Chronic | ICD-10-CM

## 2024-12-25 LAB
ALBUMIN SERPL ELPH-MCNC: 3.3 G/DL — SIGNIFICANT CHANGE UP (ref 3.3–5)
ALP SERPL-CCNC: 75 U/L — SIGNIFICANT CHANGE UP (ref 40–120)
ALT FLD-CCNC: 48 U/L — SIGNIFICANT CHANGE UP (ref 12–78)
ANION GAP SERPL CALC-SCNC: 4 MMOL/L — LOW (ref 5–17)
AST SERPL-CCNC: 48 U/L — HIGH (ref 15–37)
BASOPHILS # BLD AUTO: 0 K/UL — SIGNIFICANT CHANGE UP (ref 0–0.2)
BASOPHILS NFR BLD AUTO: 0 % — SIGNIFICANT CHANGE UP (ref 0–2)
BILIRUB SERPL-MCNC: 0.5 MG/DL — SIGNIFICANT CHANGE UP (ref 0.2–1.2)
BUN SERPL-MCNC: 21 MG/DL — SIGNIFICANT CHANGE UP (ref 7–23)
CALCIUM SERPL-MCNC: 9.1 MG/DL — SIGNIFICANT CHANGE UP (ref 8.5–10.1)
CHLORIDE SERPL-SCNC: 115 MMOL/L — HIGH (ref 96–108)
CO2 SERPL-SCNC: 23 MMOL/L — SIGNIFICANT CHANGE UP (ref 22–31)
CREAT SERPL-MCNC: 0.88 MG/DL — SIGNIFICANT CHANGE UP (ref 0.5–1.3)
EGFR: 87 ML/MIN/1.73M2 — SIGNIFICANT CHANGE UP
EOSINOPHIL # BLD AUTO: 0.09 K/UL — SIGNIFICANT CHANGE UP (ref 0–0.5)
EOSINOPHIL NFR BLD AUTO: 1 % — SIGNIFICANT CHANGE UP (ref 0–6)
GLUCOSE SERPL-MCNC: 118 MG/DL — HIGH (ref 70–99)
HCT VFR BLD CALC: 46.2 % — SIGNIFICANT CHANGE UP (ref 39–50)
HGB BLD-MCNC: 15.1 G/DL — SIGNIFICANT CHANGE UP (ref 13–17)
LIDOCAIN IGE QN: 19 U/L — SIGNIFICANT CHANGE UP (ref 13–75)
LYMPHOCYTES # BLD AUTO: 0.27 K/UL — LOW (ref 1–3.3)
LYMPHOCYTES # BLD AUTO: 3 % — LOW (ref 13–44)
MAGNESIUM SERPL-MCNC: 1.9 MG/DL — SIGNIFICANT CHANGE UP (ref 1.6–2.6)
MANUAL SMEAR VERIFICATION: SIGNIFICANT CHANGE UP
MCHC RBC-ENTMCNC: 31.3 PG — SIGNIFICANT CHANGE UP (ref 27–34)
MCHC RBC-ENTMCNC: 32.7 G/DL — SIGNIFICANT CHANGE UP (ref 32–36)
MCV RBC AUTO: 95.7 FL — SIGNIFICANT CHANGE UP (ref 80–100)
MONOCYTES # BLD AUTO: 0.35 K/UL — SIGNIFICANT CHANGE UP (ref 0–0.9)
MONOCYTES NFR BLD AUTO: 4 % — SIGNIFICANT CHANGE UP (ref 2–14)
NEUTROPHILS # BLD AUTO: 8.13 K/UL — HIGH (ref 1.8–7.4)
NEUTROPHILS NFR BLD AUTO: 91 % — HIGH (ref 43–77)
NEUTS BAND # BLD: 1 % — SIGNIFICANT CHANGE UP (ref 0–8)
NRBC # BLD: 0 /100 WBCS — SIGNIFICANT CHANGE UP (ref 0–0)
NRBC # BLD: SIGNIFICANT CHANGE UP /100 WBCS (ref 0–0)
NT-PROBNP SERPL-SCNC: 49 PG/ML — SIGNIFICANT CHANGE UP (ref 0–450)
PLAT MORPH BLD: NORMAL — SIGNIFICANT CHANGE UP
PLATELET # BLD AUTO: 141 K/UL — LOW (ref 150–400)
POTASSIUM SERPL-MCNC: 4.4 MMOL/L — SIGNIFICANT CHANGE UP (ref 3.5–5.3)
POTASSIUM SERPL-SCNC: 4.4 MMOL/L — SIGNIFICANT CHANGE UP (ref 3.5–5.3)
PROT SERPL-MCNC: 6.6 GM/DL — SIGNIFICANT CHANGE UP (ref 6–8.3)
RBC # BLD: 4.83 M/UL — SIGNIFICANT CHANGE UP (ref 4.2–5.8)
RBC # FLD: 13.2 % — SIGNIFICANT CHANGE UP (ref 10.3–14.5)
RBC BLD AUTO: NORMAL — SIGNIFICANT CHANGE UP
SODIUM SERPL-SCNC: 142 MMOL/L — SIGNIFICANT CHANGE UP (ref 135–145)
TROPONIN I, HIGH SENSITIVITY RESULT: 7.45 NG/L — SIGNIFICANT CHANGE UP
WBC # BLD: 8.84 K/UL — SIGNIFICANT CHANGE UP (ref 3.8–10.5)
WBC # FLD AUTO: 8.84 K/UL — SIGNIFICANT CHANGE UP (ref 3.8–10.5)

## 2024-12-25 PROCEDURE — 71045 X-RAY EXAM CHEST 1 VIEW: CPT | Mod: 26

## 2024-12-25 PROCEDURE — 99285 EMERGENCY DEPT VISIT HI MDM: CPT | Mod: 25

## 2024-12-25 PROCEDURE — 71045 X-RAY EXAM CHEST 1 VIEW: CPT

## 2024-12-25 PROCEDURE — 71275 CT ANGIOGRAPHY CHEST: CPT | Mod: MC

## 2024-12-25 PROCEDURE — 84484 ASSAY OF TROPONIN QUANT: CPT

## 2024-12-25 PROCEDURE — 96375 TX/PRO/DX INJ NEW DRUG ADDON: CPT | Mod: XU

## 2024-12-25 PROCEDURE — 83880 ASSAY OF NATRIURETIC PEPTIDE: CPT

## 2024-12-25 PROCEDURE — 93005 ELECTROCARDIOGRAM TRACING: CPT

## 2024-12-25 PROCEDURE — 83690 ASSAY OF LIPASE: CPT

## 2024-12-25 PROCEDURE — 85025 COMPLETE CBC W/AUTO DIFF WBC: CPT

## 2024-12-25 PROCEDURE — 83735 ASSAY OF MAGNESIUM: CPT

## 2024-12-25 PROCEDURE — 96374 THER/PROPH/DIAG INJ IV PUSH: CPT | Mod: XU

## 2024-12-25 PROCEDURE — 0241U: CPT

## 2024-12-25 PROCEDURE — 80053 COMPREHEN METABOLIC PANEL: CPT

## 2024-12-25 PROCEDURE — 71275 CT ANGIOGRAPHY CHEST: CPT | Mod: 26,MC

## 2024-12-25 PROCEDURE — 36415 COLL VENOUS BLD VENIPUNCTURE: CPT

## 2024-12-25 PROCEDURE — 99285 EMERGENCY DEPT VISIT HI MDM: CPT

## 2024-12-25 PROCEDURE — 93010 ELECTROCARDIOGRAM REPORT: CPT

## 2024-12-25 RX ORDER — DEXAMETHASONE 1.5 MG/1
5 TABLET ORAL ONCE
Refills: 0 | Status: COMPLETED | OUTPATIENT
Start: 2024-12-25 | End: 2024-12-25

## 2024-12-25 RX ORDER — ONDANSETRON HYDROCHLORIDE 4 MG/1
4 TABLET, FILM COATED ORAL ONCE
Refills: 0 | Status: COMPLETED | OUTPATIENT
Start: 2024-12-25 | End: 2024-12-25

## 2024-12-25 RX ADMIN — ONDANSETRON HYDROCHLORIDE 4 MILLIGRAM(S): 4 TABLET, FILM COATED ORAL at 22:57

## 2024-12-25 NOTE — ED PROVIDER NOTE - NSFOLLOWUPINSTRUCTIONS_ED_ALL_ED_FT
Nonspecific Chest Pain  Chest pain can be caused by many different conditions. Some causes of chest pain can be life-threatening. These will require treatment right away. Serious causes of chest pain include:  Heart attack.  A tear in the body's main blood vessel.  Redness and swelling (inflammation) around your heart.  Blood clot in your lungs.  Other causes of chest pain may not be so serious. These include:  Heartburn.  Anxiety or stress.  Damage to bones or muscles in your chest.  Lung infections.  Chest pain can feel like:  Pain or discomfort in your chest.  Crushing, pressure, aching, or squeezing pain.  Burning or tingling.  Dull or sharp pain that is worse when you move, cough, or take a deep breath.  Pain or discomfort that is also felt in your back, neck, jaw, shoulder, or arm, or pain that spreads to any of these areas.  It is hard to know whether your pain is caused by something that is serious or something that is not so serious. So it is important to see your doctor right away if you have chest pain.    Follow these instructions at home:  Medicines    Take over-the-counter and prescription medicines only as told by your doctor.  If you were prescribed an antibiotic medicine, take it as told by your doctor. Do not stop taking the antibiotic even if you start to feel better.  Lifestyle    A plate along with examples of foods in a healthy diet.  Rest as told by your doctor.  Do not use any products that contain nicotine or tobacco, such as cigarettes, e-cigarettes, and chewing tobacco. If you need help quitting, ask your doctor.  Do not drink alcohol.  Make lifestyle changes as told by your doctor. These may include:  Getting regular exercise. Ask your doctor what activities are safe for you.  Eating a heart-healthy diet. A diet and nutrition specialist (dietitian) can help you to learn healthy eating options.  Staying at a healthy weight.  Treating diabetes or high blood pressure, if needed.  Lowering your stress. Activities such as yoga and relaxation techniques can help.  General instructions    Pay attention to any changes in your symptoms. Tell your doctor about them or any new symptoms.  Avoid any activities that cause chest pain.  Keep all follow-up visits as told by your doctor. This is important. You may need more testing if your chest pain does not go away.  Contact a doctor if:  Your chest pain does not go away.  You feel depressed.  You have a fever.  Get help right away if:  Your chest pain is worse.  You have a cough that gets worse, or you cough up blood.  You have very bad (severe) pain in your belly (abdomen).  You pass out (faint).  You have either of these for no clear reason:  Sudden chest discomfort.  Sudden discomfort in your arms, back, neck, or jaw.  You have shortness of breath at any time.  You suddenly start to sweat, or your skin gets clammy.  You feel sick to your stomach (nauseous).  You throw up (vomit).  You suddenly feel lightheaded or dizzy.  You feel very weak or tired.  Your heart starts to beat fast, or it feels like it is skipping beats.  These symptoms may be an emergency. Do not wait to see if the symptoms will go away. Get medical help right away. Call your local emergency services (911 in the [place] Do not drive yourself to the hospital.    Summary  Chest pain can be caused by many different conditions. The cause may be serious and need treatment right away. If you have chest pain, see your doctor right away.  Follow your doctor's instructions for taking medicines and making lifestyle changes.  Keep all follow-up visits as told by your doctor. This includes visits for any further testing if your chest pain does not go away.  Be sure to know the signs that show that your condition has become worse. Get help right away if you have these symptoms.  This information is not intended to replace advice given to you by your health care provider. Make sure you discuss any questions you have with your health care provider.    Document Revised: 03/03/2022 Document Reviewed: 03/03/2022  Elsevier Patient Education © 2023 [employer]

## 2024-12-25 NOTE — ED ADULT NURSE NOTE - MODE OF DISCHARGE
Problem: Occupational Therapy Goal  Goal: Occupational Therapy Goal  Goals to be met by: 2/14/19     Patient will increase functional independence with ADLs by performing:    UE Dressing with [place].  LE Dressing with Modified [place] with DME/AE as needed.  Grooming while seated with Modified [place].  Toileting from toilet with Modified [place] for hygiene and clothing management with DME as needed.   Bathing from shower chair/bench with Modified [place] with DME/AE as needed.  Toilet transfer to toilet with Modified [place] with DME as needed.    OT POC initiated and established in collaboration with patient.        Wheelchair

## 2024-12-25 NOTE — ED PROVIDER NOTE - PROGRESS NOTE DETAILS
[place] SELF: Pt with SOB, persistent cp, abnormal EKG (t waves abnormalities in precordial), hx of pulmonary fibrosis, at rest 89 on RA, with 2L nasal canula sats improve to 97, CT to rule out PE, PNA, labs, and planning for admission. Signed out the care of the patient to Dr. Light pending CTr. Sign out appreciated. Rashaun Light MD repeat trop neg, offered admission  however pt would prefer to f/u as outpatient. Saturations maintained off O2 x 2 hours, ambulatory saturation 94%, stable for dc w/ cardiology f/u.no CP at dc.

## 2024-12-25 NOTE — ED PROVIDER NOTE - OBJECTIVE STATEMENT
79 year old male with hx of restrictive lung fibrosis on immune therapy, HTN, HLD, CAD presents with cc of left sided chest pain, SOB, and noted at initial evaluation to be hypoxic to 89 on RA. No hx of COPD. No known hx of PE or DVT. No hx of stents or cardiac procedures. Onset of symptoms for a day and gradually worsening. No neck pain or stiffness. No melena or hematochezia. No visual or focal neurological complaints. No back pain or abdominal pain.

## 2024-12-25 NOTE — ED PROVIDER NOTE - NSICDXPASTMEDICALHX_GEN_ALL_CORE_FT
PAST MEDICAL HISTORY:  Anxiety and depression     CVA (Cerebral Infarction) (ICD9 434.91) 2008    Diverticulitis, Intestine Large (ICD9 562.11)     Fatty liver     GERD (Gastroesophageal Reflux Disease) (ICD9 530.81)     [place]'s disease     Hip Fracture (ICD9 820.8) left    Migraine Headache (ICD9 346.90)     Obese

## 2024-12-25 NOTE — ED ADULT NURSE NOTE - NS ED NURSE LEVEL OF CONSCIOUSNESS MENTAL STATUS
-- DO NOT REPLY / DO NOT REPLY ALL --  -- Message is from [facility] Center [facility] ([facility]) --    Request Result  Is the patient currently having Emergent symptoms?: No    Which result are you requesting?: Covid Test and Chest Xray results    What is the full name of the provider that ordered the lab or test?: Dr Bunny Evans    Caller Information       Type Contact Phone/Fax    12/14/2022 11:37 AM CST Phone (Incoming) LEYDA BARRON (Emergency Contact) [phone]          Alternative phone number: None    Clinic site name / Account # for ordering provider: [place] in [place]    Can a detailed message be left?: Yes    Message Turnaround: [place]:    Refer to site's KB page for routing instructions    Please give this turnaround time to the caller:   \"You can expect to receive a response 1-3 business days after your provider's clinical team reviews the message\"    Inform patients: \"Please be aware the return phone call may come from an unidentified or out of state phone number.\"  
Called and informed him that  waiting for radiologist to read report so far everything looks normal. Also informed him that a referral has been sent for his evaluation for driving and someone should be calling him soon.   
Called and spoke with PT wife and informed her of results.  
Inform patient that the radiologist still has not read the x-ray.  On my reading I do not see any thing abnormal.  Will get back to them if the radiologist reports otherwise.  Also inform her that I sent a referral to occupational therapy for a driving evaluation.  Someone should be contacting them to set up the appointment  
Patient notified of test results, per MD and verbalized understanding.      
Tell patient results are finally back.  There was some chronic changes in the base of the lung.  No evidence of any new infection  
Awake/Alert

## 2024-12-25 NOTE — ED ADULT NURSE NOTE - CHIEF COMPLAINT QUOTE
pt bibems for chest pain since this evening. endorses SOB and nausea all day. denies cardiac hx. pain 4/10 left side radiates to back. left side deficits from CVA takes plavix. received 324 of aspirin and 0.4 NTG en route. sent to room for EKG.

## 2024-12-25 NOTE — ED ADULT NURSE NOTE - NSFALLASSESSNEED_ED_ALL_ED
[facility]



NAME: CHIARA HECK

MRN:  [id number]

[facility] REFERENCE #:

:  [id number]

DATE OF VISIT:  2019



PRIMARY CARE:  Madiha Jasso PA-C; Samuel Coppola MD, [facility] [place]



CHIEF COMPLAINT:  Elevated blood pressure and family history of young

cardiac disease.



The patient is seen with mother at the [facility]

at [facility].  He denies any cardiac pains and he is athletic, but

at this visit he does admit to feeling his heart flutter more than one

time per week.  He is a wrestler.  He has never fainted or had near

fainting.  He gets a lot of headaches.



He has had blood pressure checked at the pharmacy on several occasions

with systolics 120-150 and diastolic about 80.  Mother says home blood

pressure at its worst was 160/82.



The family history is remarkable for hypertension and early heart disease.

See family history section.



MEDICATIONS:  None.



ALLERGIES:  None.



SOCIAL HISTORY:  Lives with mother and stepfather and brothers.  The

patient does not smoke.



PAST HOSPITALIZATION:  None.



PAST SURGERY:  Tympanostomy and tonsillectomy and adenoidectomy.



REVIEW OF SYSTEMS:  Negative for abnormal weight changes, vision problems,

hearing problems, wheezing or coughing, snoring, GI, urinary,

musculoskeletal, developmental, or skin.  He gets a lot of headaches.



FAMILY HISTORY:  Mother with hypertension.  States that she developed

congestive heart failure at age 28, but on medication heart function

normalized and she was taken off of medication.  She is stated to have

thyroid abnormality.  Maternal grandmother, diabetes and hypertension. 

Maternal uncle  in his sleep at age 32 with some kind of heart

problem, mother believes it was congenital.  It is not clear from this

history if he was sickly or ill chronically or acutely, or if this was a

sudden unannounced cardiac death.



PHYSICAL EXAMINATION:

VITAL SIGNS:  Weight 139 pounds, height 69 inches, blood pressure Dinamap

138/62, heart rate 101.  Repeat blood pressure Dinamap 134/58, repeat 89. 

Manual blood pressure 126/56.

CARDIAC:  Soft flow murmur at the left sternal edge.  Quiet second heart

sound and brisk abdominal aortic pulsation and femoral pulsation.

GENERAL:  This is a tall, slender young man without Marfan features. 

Thyroid not enlarged.

LUNGS:  Clear bilaterally.

HEART:  Precordial activity normal.

ABDOMEN:  Without organomegaly.

NEUROLOGIC:  Gait and coordination normal.



Twelve-lead EKG shows an indeterminate axis in the limb leads, but normal

voltages in the chest leads, although the S-waves are a little generous in

size in the left chest leads.  EKG is borderline for right ventricular

hypertrophy.



All intervals are normal, including the QRS with 96 milliseconds and the

 milliseconds.



Echocardiogram is normal.  The right ventricle appears normal.  There is

no RVH.  The left ventricle shows no abnormal hypertrophy related to blood

pressure elevation and shows excellent performance with no suggestion of

left ventricular cardiomyopathy, given his family history.  There is

normal tricuspid regurgitation without pulmonary hypertension, but no

abnormal turbulence at the valves and therefore his murmur is a normal

murmur.



CONCLUSIONS:

1.   HIS SYSTOLIC BLOOD PRESSURE TENDS TO BE A LITTLE ELEVATED, BUT HE HAS NO

     LEFT VENTRICULAR HYPERTROPHY IN HIS DIASTOLIC BLOOD PRESSURES HERE,

     BOTH BY DINAMAP AND MANUAL, AND ON REPEATS ARE RATHER LOW, WHICH IS

     REASSURING.

2.   HIS ELECTROCARDIOGRAM IS BORDERLINE FOR RIGHT VENTRICULAR HYPERTROPHY WITH

     INDETERMINATE AXIS.  IT IS POSSIBLE HIS ELECTROCARDIOGRAM SIMPLY

     REFLECTS HIS VERY SLENDER BODY HABITUS.  IT DOES NOT SHOW SIGNS OF AN

     ARRHYTHMIA TENDENCY OR PREDILECTION, AND HIS ECHO REVEALS NO EVIDENCE

     OF RIGHT OR LEFT VENTRICULAR ABNORMALITY OR ENLARGEMENT.

3.   HE HAS A NORMAL FLOW MURMUR, AS HIS HEART IS NORMAL ON ECHO.

4.   HE COMPLAINS OF SOME HEART FLUTTERING OR PALPITATION.

5.   HIS MOTHER STATES SHE HAD HEART FAILURE AT AGE 28, BUT NO LONGER DOES, BUT

     HIS MOTHER'S BROTHER  DURING HIS SLEEP AT AGE 32.  UNCLEAR IF

     THIS WAS A CHRONIC CARDIOMYOPATHY OR NOT.



PLAN:  The information we have at present would allow him to continue his

sports.  We are sending a thirty-day EKG event recorder so that we can

record during his normal activities and exercise if he has any abnormal

arrhythmias and certainly record his EKG during any symptomatic

palpitation to rule out any abnormal arrhythmia.  I do not think he needs

treatment for high blood pressure, but will need followup from his primary

care.  I will communicate with Primary Care about results of the EKG

monitoring.  Because of the family history, it may be advisable to keep

him in followup with us as well, and I will talk with the mother about an

updated visit in a year to review family history and any other issues.  In

the meantime, I will ask her to see if she can get more specific

information about what her own diagnosis was and possibly the diagnosis of

her late brother.  I will ask our nurse to get the results of the lab

tests that were done by Madiha Jasso, which included lipids, TSH, and

metabolic profile.





ADRI SCOTT MD









[id number]                  DT: 2019

PHY#: [id number]            DD: [id number]

ID:   [id number]           JOB#: [id number]       ACCT: [id number]



cc:ADRI SCOTT MD, KIM PAJajaC

> yes

## 2024-12-25 NOTE — ED ADULT NURSE NOTE - NSFALLHARMRISKINTERV_ED_ALL_ED

## 2024-12-25 NOTE — ED PROVIDER NOTE - PATIENT PORTAL LINK FT
You can access the FollowMyHealth Patient Portal offered by [facility] by registering at the following website: [email or web address]. By joining [employer]’s FollowMyHealth portal, you will also be able to view your health information using other applications (apps) compatible with our system.

## 2024-12-25 NOTE — ED ADULT TRIAGE NOTE - CHIEF COMPLAINT QUOTE
pt bibems for chest pain since this evening. endorses SOB and nausea all day. denies cardiac hx. pain 4/10 left side radiates to back. left side deficits from CVA takes plavix. received 324 of aspirin and 0.4 NTG en route. sent to room for EKG. NSR on EKG twice en route. pt bibems for chest pain since this evening. endorses SOB and nausea all day. denies cardiac hx. pain 4/10 left side radiates to back. left side deficits from CVA takes plavix. received 324 of aspirin and 0.4 NTG en route. sent to room for EKG.

## 2024-12-25 NOTE — ED ADULT NURSE NOTE - OBJECTIVE STATEMENT
Pt BIBEMS w c/o L sided chest pain, nausea, and SOB for a few hours. Pt is A&Ox3 and ambulates with a cane at baseline. Denies vomiting, diarrhea, and fevers. EKG completed upon arrival and pt is set up on cardiac monitor. Pt is on 2 L nasal cannula satting 97% at this time.

## 2024-12-26 VITALS
OXYGEN SATURATION: 96 % | TEMPERATURE: 98 F | SYSTOLIC BLOOD PRESSURE: 111 MMHG | DIASTOLIC BLOOD PRESSURE: 75 MMHG | HEART RATE: 98 BPM | RESPIRATION RATE: 16 BRPM

## 2024-12-26 LAB
FLUAV AG NPH QL: SIGNIFICANT CHANGE UP
FLUBV AG NPH QL: SIGNIFICANT CHANGE UP
RSV RNA NPH QL NAA+NON-PROBE: SIGNIFICANT CHANGE UP
SARS-COV-2 RNA SPEC QL NAA+PROBE: SIGNIFICANT CHANGE UP
TROPONIN I, HIGH SENSITIVITY RESULT: 8.6 NG/L — SIGNIFICANT CHANGE UP

## 2024-12-26 RX ORDER — PANTOPRAZOLE SODIUM 40 MG/1
1 TABLET, DELAYED RELEASE ORAL
Refills: 0 | DISCHARGE

## 2024-12-26 RX ORDER — MYCOPHENOLATE MOFETIL 500 MG/1
3 TABLET ORAL
Refills: 0 | DISCHARGE

## 2024-12-26 RX ADMIN — DEXAMETHASONE 5 MILLIGRAM(S): 1.5 TABLET ORAL at 00:00

## 2024-12-26 NOTE — PHARMACOTHERAPY INTERVENTION NOTE - COMMENTS
Medication history complete, reviewed medications with patient spouse at bedside and confirmed with doctor first med hx.

## 2025-01-02 ENCOUNTER — NON-APPOINTMENT (OUTPATIENT)
Age: 80
End: 2025-01-02

## 2025-01-07 ENCOUNTER — APPOINTMENT (OUTPATIENT)
Dept: PULMONOLOGY | Facility: CLINIC | Age: 80
End: 2025-01-07
Payer: MEDICARE

## 2025-01-07 VITALS
SYSTOLIC BLOOD PRESSURE: 100 MMHG | DIASTOLIC BLOOD PRESSURE: 70 MMHG | TEMPERATURE: 98.7 F | HEIGHT: 70 IN | OXYGEN SATURATION: 95 % | HEART RATE: 104 BPM | RESPIRATION RATE: 15 BRPM

## 2025-01-07 PROCEDURE — 99215 OFFICE O/P EST HI 40 MIN: CPT

## 2025-01-09 ENCOUNTER — OUTPATIENT (OUTPATIENT)
Dept: OUTPATIENT SERVICES | Facility: HOSPITAL | Age: 80
LOS: 1 days | End: 2025-01-09
Payer: MEDICARE

## 2025-01-09 ENCOUNTER — RESULT REVIEW (OUTPATIENT)
Age: 80
End: 2025-01-09

## 2025-01-09 DIAGNOSIS — Z90.49 ACQUIRED ABSENCE OF OTHER SPECIFIED PARTS OF DIGESTIVE TRACT: Chronic | ICD-10-CM

## 2025-01-09 DIAGNOSIS — Z98.890 OTHER SPECIFIED POSTPROCEDURAL STATES: Chronic | ICD-10-CM

## 2025-01-09 DIAGNOSIS — R13.14 DYSPHAGIA, PHARYNGOESOPHAGEAL PHASE: ICD-10-CM

## 2025-01-09 PROCEDURE — 74230 X-RAY XM SWLNG FUNCJ C+: CPT | Mod: 26

## 2025-01-09 PROCEDURE — 74230 X-RAY XM SWLNG FUNCJ C+: CPT

## 2025-01-09 PROCEDURE — 92611 MOTION FLUOROSCOPY/SWALLOW: CPT | Mod: GN

## 2025-01-09 NOTE — SWALLOW VFSS/MBS ASSESSMENT ADULT - DIAGNOSTIC IMPRESSIONS
THE PATIENT DEMONSTRATES AGE ACCEPTABLE ORAL SWALLOWING INTEGRITY ATOP PHARYNGEAL DYSPHAGIA OF MILD TO MODERATE SEVERITY WHICH IS FELT TO BE A GROSSLY FUNCTIONAL CONDITION AT THIS TIME. NO LARYNGEAL PENETRATION, OR ASPIRATION WERE DEMONSTRATED UNDER FLUOROSCOPIC CONTROL. HOWEVER, A MILD TO MODERATE AMOUNT OF POST PRANDIAL PHARYNGEAL RETENTION WAS VISUALIZED IN THE VALLECULAE/PYRIFORM SINUSES, SOME OF WHICH WAS CLEARED FOLLOWING EMPLOYMENT OF CYCLIC INGESTION AND/OR A SELF GENERATED DRY SWALLOW AFTER PRIMARY SWALLOWING TRIALS. ADDITIONALLY, A SMALL ZENKER'S DIVERTICULUM WAS VISUALIZED AT C5/C6 AND POST PRANDIAL PHARYNGEAL RETENTION WAS ALSO APPARENT WITHIN THE OUTPOUCH OF HIS ZENKER'S DIVERTICULUM.

## 2025-01-09 NOTE — SWALLOW VFSS/MBS ASSESSMENT ADULT - CONSISTENCIES ADMINISTERED
All PO was either coated or injected with barium for contrast purposes./thin liquid/mildly thick/pureed/easy to chew/regular solid

## 2025-01-09 NOTE — SWALLOW VFSS/MBS ASSESSMENT ADULT - ORAL PREP COMMENTS
The patient willingly accepted barium altered food materials and demonstrated functional labial grading on utensils.

## 2025-01-09 NOTE — SWALLOW VFSS/MBS ASSESSMENT ADULT - ORAL PHASE COMMENTS
Bolus formation/transfer were achieved via functional AP lingual actions and functional rotary masticatory motions that were age acceptable. The patient cleared oral debris after age acceptable piecemeal deglutition.

## 2025-01-09 NOTE — SWALLOW VFSS/MBS ASSESSMENT ADULT - PHARYNGEAL PHASE COMMENTS
Swallow triggered in an acceptable time frame for age. Hyo laryngeal excursion and epiglottic retroflexion during the swallow were mildly reduced but felt to be functional with grossly sufficient prandial airway protection. Pharyngeal motility was mildly to moderately reduced but felt to be grossly functional as well. Cricopharyngeal sphincter opening was acceptable. NO LARYNGEAL PENETRATION, OR ASPIRATION WERE DEMONSTRATED UNDER FLUOROSCOPIC CONTROL. HOWEVER, A MILD TO MODERATE AMOUNT OF POST PRANDIAL PHARYNGEAL RETENTION WAS VISUALIZED IN THE VALLECULAE/PYRIFORM SINUSES, SOME OF WHICH WAS CLEARED FOLLOWING EMPLOYMENT OF CYCLIC INGESTION AND/OR A SELF GENERATED DRY SWALLOW AFTER PRIMARY SWALLOWING TRIALS. ADDITIONALLY, A SMALL ZENKER'S DIVERTICULUM WAS VISUALIZED AT C5/C6 AND POST PRANDIAL PHARYNGEAL RETENTION WAS ALSO APPARENT WITHIN THE OUTPOUCH OF HIS ZENKER'S DIVERTICULUM.

## 2025-01-09 NOTE — SWALLOW VFSS/MBS ASSESSMENT ADULT - COMMENTS
The patient was referred for an outpatient Modified Barium Swallowing Study(MBS). 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 Additionally, the pt stated he initially experienced swallowing difficulties after a stroke sustained in 2008 but than his swallowing difficulties resolved. However, the patient stated that he recently has noted that his swallowing difficulties have resurfaced. Pt is on a regular texture diet but indicated that he now variably coughs when eating. Coughing episodes are predominately post prandial and not food consistency specific. The patient indicated that he is receiving swallowing therapy as an outpatient at [facility].     Addendum: Pt's wife reported that the patient has been diagnosed with Interstitial Lung Disease but I do not see this brynn fernandez The patient was referred for an outpatient Modified Barium Swallowing Study(MBS). 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 Additionally, the pt stated he initially experienced swallowing difficulties after a stroke sustained in 2008 but than his swallowing difficulties resolved. However, the patient stated that he recently has noted that his swallowing difficulties have resurfaced. Pt is on a regular texture diet but indicated that he now variably coughs when eating. Coughing episodes are predominately post prandial and not food consistency specific. The patient indicated that he is receiving swallowing therapy as an outpatient at [facility].     Addendum: Pt's wife reported that the patient has been diagnosed with Interstitial Lung Disease but I do not see this diagnosis in [facility].

## 2025-01-09 NOTE — SWALLOW VFSS/MBS ASSESSMENT ADULT - RECOMMENDED CONSISTENCY
1) THE PATIENT APPEARS CLINICALLY TOLERANT OF FOOD TEXTURES ON A REGULAR CONSISTENCY DIET WITH THIN LIQUIDS FROM AN OROPHARYNGEAL SWALLOWING ON EXAM, DESPITE PHARYNGEAL DYSPHAGIA/PRESENCE OF A SMALL ZENKER'S DIVERTICULUM.     2) THE PATIENT SHOULD BE POSTURED UPRIGHT DURING AND AT LEAST 60 MINUTES AFTER EATING. THE PATIENT SHOULD BE ENCOURAGED TO ALTERNATE LIQUIDS WITH MORE COARSE FOODS FREQUENTLY. PT ALSO ENCOURAGED TO PERIODICALLY EMPLOY A DRY SWALLOW AFTER PRIMARY SWALLOWING TRIALS. THESE STRATEGIES ENHANCE SWALLOWING SAFETY.     3) CONTINUE SWALLOWING THERAPY WITH SPEECH PATHOLOGIST. FOCUS ON PHARYNGEAL IMPAIRMENTS. ORAL FUNCTION RELATIVELY PRESERVED.    4) MEDICAL F/U AT DISCRETION OF CISCO MELENDEZ. NOTE THAT PATIENT WITH A SMALL ZENKER'S DIVERTICULUM FOUND DURING MBS PROCEDURE. THIS MAY PLACE PT AT INCREASED RISK FOR EPISODIC POST PRANDIAL ASPIRATION OF RESIDUE IN DIVERTICULUM.     5) RECONSULT LINO CULVER MA, [facility]  , SPEECH PATHOLOGY   [phone]

## 2025-01-10 DIAGNOSIS — R13.14 DYSPHAGIA, PHARYNGOESOPHAGEAL PHASE: ICD-10-CM

## 2025-01-13 ENCOUNTER — RX RENEWAL (OUTPATIENT)
Age: 80
End: 2025-01-13

## 2025-01-15 NOTE — H&P PST ADULT - HEIGHT IN INCHES
10
Quality 226: Preventive Care And Screening: Tobacco Use: Screening And Cessation Intervention: Patient screened for tobacco use and is an ex/non-smoker
Detail Level: Simple

## 2025-01-22 DIAGNOSIS — K22.5 DIVERTICULUM OF ESOPHAGUS, ACQUIRED: ICD-10-CM

## 2025-02-04 ENCOUNTER — APPOINTMENT (OUTPATIENT)
Dept: INTERNAL MEDICINE | Facility: CLINIC | Age: 80
End: 2025-02-04
Payer: MEDICARE

## 2025-02-04 ENCOUNTER — RESULT CHARGE (OUTPATIENT)
Age: 80
End: 2025-02-04

## 2025-02-04 VITALS
HEART RATE: 103 BPM | TEMPERATURE: 98.1 F | OXYGEN SATURATION: 95 % | SYSTOLIC BLOOD PRESSURE: 116 MMHG | RESPIRATION RATE: 16 BRPM | DIASTOLIC BLOOD PRESSURE: 79 MMHG | HEIGHT: 70 IN | BODY MASS INDEX: 30.78 KG/M2 | WEIGHT: 215 LBS

## 2025-02-04 DIAGNOSIS — J98.4 OTHER DISORDERS OF LUNG: ICD-10-CM

## 2025-02-04 DIAGNOSIS — J47.9 BRONCHIECTASIS, UNCOMPLICATED: ICD-10-CM

## 2025-02-04 DIAGNOSIS — R53.83 OTHER FATIGUE: ICD-10-CM

## 2025-02-04 DIAGNOSIS — J84.10 PULMONARY FIBROSIS, UNSPECIFIED: ICD-10-CM

## 2025-02-04 DIAGNOSIS — R06.00 DYSPNEA, UNSPECIFIED: ICD-10-CM

## 2025-02-04 DIAGNOSIS — R06.83 SNORING: ICD-10-CM

## 2025-02-04 DIAGNOSIS — J45.909 UNSPECIFIED ASTHMA, UNCOMPLICATED: ICD-10-CM

## 2025-02-04 PROCEDURE — 94060 EVALUATION OF WHEEZING: CPT

## 2025-02-04 PROCEDURE — 94729 DIFFUSING CAPACITY: CPT

## 2025-02-04 PROCEDURE — 99214 OFFICE O/P EST MOD 30 MIN: CPT | Mod: 25

## 2025-02-04 PROCEDURE — ZZZZZ: CPT

## 2025-02-11 ENCOUNTER — OUTPATIENT (OUTPATIENT)
Dept: OUTPATIENT SERVICES | Facility: HOSPITAL | Age: 80
LOS: 1 days | End: 2025-02-11
Payer: MEDICARE

## 2025-02-11 DIAGNOSIS — Z90.49 ACQUIRED ABSENCE OF OTHER SPECIFIED PARTS OF DIGESTIVE TRACT: Chronic | ICD-10-CM

## 2025-02-11 DIAGNOSIS — Z98.890 OTHER SPECIFIED POSTPROCEDURAL STATES: Chronic | ICD-10-CM

## 2025-02-11 DIAGNOSIS — G47.33 OBSTRUCTIVE SLEEP APNEA (ADULT) (PEDIATRIC): ICD-10-CM

## 2025-02-11 PROCEDURE — G0400: CPT | Mod: 26

## 2025-02-11 PROCEDURE — 95800 SLP STDY UNATTENDED: CPT

## 2025-02-19 ENCOUNTER — NON-APPOINTMENT (OUTPATIENT)
Age: 80
End: 2025-02-19

## 2025-02-19 DIAGNOSIS — G47.33 OBSTRUCTIVE SLEEP APNEA (ADULT) (PEDIATRIC): ICD-10-CM

## 2025-03-04 ENCOUNTER — APPOINTMENT (OUTPATIENT)
Dept: INTERNAL MEDICINE | Facility: CLINIC | Age: 80
End: 2025-03-04
Payer: MEDICARE

## 2025-03-04 ENCOUNTER — APPOINTMENT (OUTPATIENT)
Dept: PULMONOLOGY | Facility: CLINIC | Age: 80
End: 2025-03-04

## 2025-03-04 VITALS
HEART RATE: 69 BPM | SYSTOLIC BLOOD PRESSURE: 126 MMHG | TEMPERATURE: 98.2 F | DIASTOLIC BLOOD PRESSURE: 82 MMHG | BODY MASS INDEX: 30.06 KG/M2 | RESPIRATION RATE: 18 BRPM | OXYGEN SATURATION: 95 % | WEIGHT: 210 LBS | HEIGHT: 70 IN

## 2025-03-04 PROCEDURE — 99214 OFFICE O/P EST MOD 30 MIN: CPT | Mod: 25

## 2025-03-04 PROCEDURE — ZZZZZ: CPT

## 2025-03-04 PROCEDURE — 94729 DIFFUSING CAPACITY: CPT

## 2025-03-04 PROCEDURE — 94060 EVALUATION OF WHEEZING: CPT

## 2025-03-04 PROCEDURE — 94727 GAS DIL/WSHOT DETER LNG VOL: CPT

## 2025-03-05 LAB
BASOPHILS # BLD AUTO: 0.05 K/UL
BASOPHILS NFR BLD AUTO: 0.8 %
EOSINOPHIL # BLD AUTO: 0.13 K/UL
EOSINOPHIL NFR BLD AUTO: 2.1 %
HCT VFR BLD CALC: 48.2 %
HGB BLD-MCNC: 15.7 G/DL
IMM GRANULOCYTES NFR BLD AUTO: 0.3 %
LYMPHOCYTES # BLD AUTO: 1.26 K/UL
LYMPHOCYTES NFR BLD AUTO: 20.6 %
MAN DIFF?: NORMAL
MCHC RBC-ENTMCNC: 30.3 PG
MCHC RBC-ENTMCNC: 32.6 G/DL
MCV RBC AUTO: 93.1 FL
MONOCYTES # BLD AUTO: 0.57 K/UL
MONOCYTES NFR BLD AUTO: 9.3 %
NEUTROPHILS # BLD AUTO: 4.09 K/UL
NEUTROPHILS NFR BLD AUTO: 66.9 %
PLATELET # BLD AUTO: 173 K/UL
RBC # BLD: 5.18 M/UL
RBC # FLD: 13.2 %
WBC # FLD AUTO: 6.12 K/UL

## 2025-03-09 ENCOUNTER — OUTPATIENT (OUTPATIENT)
Dept: OUTPATIENT SERVICES | Facility: HOSPITAL | Age: 80
LOS: 1 days | End: 2025-03-09
Payer: MEDICARE

## 2025-03-09 DIAGNOSIS — Z98.890 OTHER SPECIFIED POSTPROCEDURAL STATES: Chronic | ICD-10-CM

## 2025-03-09 DIAGNOSIS — Z90.49 ACQUIRED ABSENCE OF OTHER SPECIFIED PARTS OF DIGESTIVE TRACT: Chronic | ICD-10-CM

## 2025-03-09 DIAGNOSIS — G47.33 OBSTRUCTIVE SLEEP APNEA (ADULT) (PEDIATRIC): ICD-10-CM

## 2025-03-09 PROCEDURE — 95811 POLYSOM 6/>YRS CPAP 4/> PARM: CPT | Mod: 26

## 2025-03-09 PROCEDURE — 95811 POLYSOM 6/>YRS CPAP 4/> PARM: CPT

## 2025-03-12 PROCEDURE — 95811 POLYSOM 6/>YRS CPAP 4/> PARM: CPT | Mod: 26

## 2025-03-28 ENCOUNTER — APPOINTMENT (OUTPATIENT)
Dept: UROLOGY | Facility: CLINIC | Age: 80
End: 2025-03-28
Payer: MEDICARE

## 2025-03-28 DIAGNOSIS — N39.41 URGE INCONTINENCE: ICD-10-CM

## 2025-03-28 DIAGNOSIS — N39.0 URINARY TRACT INFECTION, SITE NOT SPECIFIED: ICD-10-CM

## 2025-03-28 PROCEDURE — G2211 COMPLEX E/M VISIT ADD ON: CPT

## 2025-03-28 PROCEDURE — 99213 OFFICE O/P EST LOW 20 MIN: CPT

## 2025-04-28 ENCOUNTER — APPOINTMENT (OUTPATIENT)
Dept: PULMONOLOGY | Facility: CLINIC | Age: 80
End: 2025-04-28
Payer: MEDICARE

## 2025-04-28 ENCOUNTER — OUTPATIENT (OUTPATIENT)
Dept: OUTPATIENT SERVICES | Facility: HOSPITAL | Age: 80
LOS: 1 days | End: 2025-04-28
Payer: MEDICARE

## 2025-04-28 ENCOUNTER — APPOINTMENT (OUTPATIENT)
Dept: RADIOLOGY | Facility: CLINIC | Age: 80
End: 2025-04-28
Payer: MEDICARE

## 2025-04-28 VITALS
HEIGHT: 70 IN | DIASTOLIC BLOOD PRESSURE: 68 MMHG | OXYGEN SATURATION: 93 % | HEART RATE: 94 BPM | SYSTOLIC BLOOD PRESSURE: 110 MMHG | WEIGHT: 215 LBS | BODY MASS INDEX: 30.78 KG/M2 | TEMPERATURE: 98.7 F

## 2025-04-28 DIAGNOSIS — R05.9 COUGH, UNSPECIFIED: ICD-10-CM

## 2025-04-28 DIAGNOSIS — Z98.890 OTHER SPECIFIED POSTPROCEDURAL STATES: Chronic | ICD-10-CM

## 2025-04-28 DIAGNOSIS — Z90.49 ACQUIRED ABSENCE OF OTHER SPECIFIED PARTS OF DIGESTIVE TRACT: Chronic | ICD-10-CM

## 2025-04-28 DIAGNOSIS — J84.9 INTERSTITIAL PULMONARY DISEASE, UNSPECIFIED: ICD-10-CM

## 2025-04-28 DIAGNOSIS — R91.1 SOLITARY PULMONARY NODULE: ICD-10-CM

## 2025-04-28 DIAGNOSIS — J98.4 OTHER DISORDERS OF LUNG: ICD-10-CM

## 2025-04-28 DIAGNOSIS — R06.2 WHEEZING: ICD-10-CM

## 2025-04-28 DIAGNOSIS — J45.909 UNSPECIFIED ASTHMA, UNCOMPLICATED: ICD-10-CM

## 2025-04-28 PROCEDURE — 71046 X-RAY EXAM CHEST 2 VIEWS: CPT | Mod: 26

## 2025-04-28 PROCEDURE — 71046 X-RAY EXAM CHEST 2 VIEWS: CPT

## 2025-04-28 PROCEDURE — 99214 OFFICE O/P EST MOD 30 MIN: CPT

## 2025-04-28 RX ORDER — AZITHROMYCIN 250 MG/1
250 TABLET, FILM COATED ORAL
Qty: 1 | Refills: 0 | Status: ACTIVE | COMMUNITY
Start: 2025-04-28 | End: 1900-01-01

## 2025-04-28 RX ORDER — PREDNISONE 20 MG/1
20 TABLET ORAL DAILY
Qty: 5 | Refills: 0 | Status: ACTIVE | COMMUNITY
Start: 2025-04-28 | End: 1900-01-01

## 2025-05-08 ENCOUNTER — APPOINTMENT (OUTPATIENT)
Dept: PULMONOLOGY | Facility: CLINIC | Age: 80
End: 2025-05-08
Payer: MEDICARE

## 2025-05-08 VITALS
DIASTOLIC BLOOD PRESSURE: 70 MMHG | RESPIRATION RATE: 18 BRPM | OXYGEN SATURATION: 92 % | HEIGHT: 70 IN | HEART RATE: 99 BPM | TEMPERATURE: 98.2 F | WEIGHT: 210 LBS | SYSTOLIC BLOOD PRESSURE: 100 MMHG | BODY MASS INDEX: 30.06 KG/M2

## 2025-05-08 PROCEDURE — 99215 OFFICE O/P EST HI 40 MIN: CPT

## 2025-05-08 PROCEDURE — G2211 COMPLEX E/M VISIT ADD ON: CPT

## 2025-05-09 LAB
BASOPHILS # BLD AUTO: 0.05 K/UL
BASOPHILS NFR BLD AUTO: 0.9 %
EOSINOPHIL # BLD AUTO: 0.17 K/UL
EOSINOPHIL NFR BLD AUTO: 3.2 %
HCT VFR BLD CALC: 44.1 %
HGB BLD-MCNC: 14.1 G/DL
IMM GRANULOCYTES NFR BLD AUTO: 0.6 %
LYMPHOCYTES # BLD AUTO: 1.26 K/UL
LYMPHOCYTES NFR BLD AUTO: 23.4 %
MAN DIFF?: NORMAL
MCHC RBC-ENTMCNC: 29.4 PG
MCHC RBC-ENTMCNC: 32 G/DL
MCV RBC AUTO: 91.9 FL
MONOCYTES # BLD AUTO: 0.61 K/UL
MONOCYTES NFR BLD AUTO: 11.3 %
NEUTROPHILS # BLD AUTO: 3.27 K/UL
NEUTROPHILS NFR BLD AUTO: 60.6 %
PLATELET # BLD AUTO: 192 K/UL
RBC # BLD: 4.8 M/UL
RBC # FLD: 13.3 %
WBC # FLD AUTO: 5.39 K/UL

## 2025-05-15 ENCOUNTER — RX RENEWAL (OUTPATIENT)
Age: 80
End: 2025-05-15

## 2025-06-23 ENCOUNTER — NON-APPOINTMENT (OUTPATIENT)
Age: 80
End: 2025-06-23

## 2025-06-27 ENCOUNTER — RX RENEWAL (OUTPATIENT)
Age: 80
End: 2025-06-27

## 2025-07-08 ENCOUNTER — OUTPATIENT (OUTPATIENT)
Dept: OUTPATIENT SERVICES | Facility: HOSPITAL | Age: 80
LOS: 1 days | End: 2025-07-08
Payer: MEDICARE

## 2025-07-08 ENCOUNTER — APPOINTMENT (OUTPATIENT)
Dept: CT IMAGING | Facility: CLINIC | Age: 80
End: 2025-07-08
Payer: MEDICARE

## 2025-07-08 DIAGNOSIS — J84.9 INTERSTITIAL PULMONARY DISEASE, UNSPECIFIED: ICD-10-CM

## 2025-07-08 DIAGNOSIS — Z98.890 OTHER SPECIFIED POSTPROCEDURAL STATES: Chronic | ICD-10-CM

## 2025-07-08 DIAGNOSIS — Z90.49 ACQUIRED ABSENCE OF OTHER SPECIFIED PARTS OF DIGESTIVE TRACT: Chronic | ICD-10-CM

## 2025-07-08 PROCEDURE — 71250 CT THORAX DX C-: CPT | Mod: 26

## 2025-07-08 PROCEDURE — 71250 CT THORAX DX C-: CPT

## 2025-07-15 ENCOUNTER — APPOINTMENT (OUTPATIENT)
Dept: PULMONOLOGY | Facility: CLINIC | Age: 80
End: 2025-07-15
Payer: MEDICARE

## 2025-07-15 ENCOUNTER — APPOINTMENT (OUTPATIENT)
Facility: CLINIC | Age: 80
End: 2025-07-15
Payer: MEDICARE

## 2025-07-15 VITALS
HEIGHT: 70 IN | OXYGEN SATURATION: 94 % | WEIGHT: 215 LBS | HEART RATE: 104 BPM | TEMPERATURE: 98 F | DIASTOLIC BLOOD PRESSURE: 84 MMHG | BODY MASS INDEX: 30.78 KG/M2 | RESPIRATION RATE: 18 BRPM | SYSTOLIC BLOOD PRESSURE: 110 MMHG

## 2025-07-15 PROCEDURE — 99215 OFFICE O/P EST HI 40 MIN: CPT

## 2025-09-03 ENCOUNTER — APPOINTMENT (OUTPATIENT)
Dept: INTERNAL MEDICINE | Facility: CLINIC | Age: 80
End: 2025-09-03
Payer: MEDICARE

## 2025-09-03 VITALS
DIASTOLIC BLOOD PRESSURE: 75 MMHG | WEIGHT: 215 LBS | OXYGEN SATURATION: 96 % | BODY MASS INDEX: 30.78 KG/M2 | RESPIRATION RATE: 16 BRPM | SYSTOLIC BLOOD PRESSURE: 115 MMHG | TEMPERATURE: 98 F | HEIGHT: 70 IN | HEART RATE: 90 BPM

## 2025-09-03 DIAGNOSIS — R06.00 DYSPNEA, UNSPECIFIED: ICD-10-CM

## 2025-09-03 DIAGNOSIS — J84.9 INTERSTITIAL PULMONARY DISEASE, UNSPECIFIED: ICD-10-CM

## 2025-09-03 DIAGNOSIS — J45.909 UNSPECIFIED ASTHMA, UNCOMPLICATED: ICD-10-CM

## 2025-09-03 DIAGNOSIS — R53.83 OTHER FATIGUE: ICD-10-CM

## 2025-09-03 DIAGNOSIS — R05.9 COUGH, UNSPECIFIED: ICD-10-CM

## 2025-09-03 DIAGNOSIS — G47.33 OBSTRUCTIVE SLEEP APNEA (ADULT) (PEDIATRIC): ICD-10-CM

## 2025-09-03 DIAGNOSIS — J98.4 OTHER DISORDERS OF LUNG: ICD-10-CM

## 2025-09-03 DIAGNOSIS — J47.9 BRONCHIECTASIS, UNCOMPLICATED: ICD-10-CM

## 2025-09-03 PROCEDURE — 99215 OFFICE O/P EST HI 40 MIN: CPT | Mod: 25

## 2025-09-03 PROCEDURE — 94060 EVALUATION OF WHEEZING: CPT

## 2025-09-03 PROCEDURE — ZZZZZ: CPT

## 2025-09-03 PROCEDURE — 94729 DIFFUSING CAPACITY: CPT

## 2025-09-03 PROCEDURE — 94727 GAS DIL/WSHOT DETER LNG VOL: CPT

## 2025-09-08 RX ORDER — IPRATROPIUM BROMIDE AND ALBUTEROL SULFATE 2.5; .5 MG/3ML; MG/3ML
0.5-2.5 (3) SOLUTION RESPIRATORY (INHALATION) TWICE DAILY
Qty: 1 | Refills: 11 | Status: ACTIVE | COMMUNITY
Start: 2025-09-03 | End: 1900-01-01

## 2025-09-08 RX ORDER — BUDESONIDE 0.5 MG/2ML
0.5 INHALANT ORAL TWICE DAILY
Qty: 2 | Refills: 11 | Status: ACTIVE | COMMUNITY
Start: 2025-09-03 | End: 1900-01-01

## 2025-09-11 ENCOUNTER — RX CHANGE (OUTPATIENT)
Age: 80
End: 2025-09-11

## 2025-09-24 PROBLEM — I61.9 BRAIN BLEED: Status: ACTIVE | Noted: 2025-09-24
